# Patient Record
Sex: MALE | Race: BLACK OR AFRICAN AMERICAN | Employment: FULL TIME | URBAN - METROPOLITAN AREA
[De-identification: names, ages, dates, MRNs, and addresses within clinical notes are randomized per-mention and may not be internally consistent; named-entity substitution may affect disease eponyms.]

---

## 2018-03-13 LAB
ALBUMIN SERPL-MCNC: NORMAL G/DL
ALP BLD-CCNC: NORMAL U/L
ALT SERPL-CCNC: NORMAL U/L
ANION GAP SERPL CALCULATED.3IONS-SCNC: NORMAL MMOL/L
AST SERPL-CCNC: NORMAL U/L
AVERAGE GLUCOSE: 160
BILIRUB SERPL-MCNC: NORMAL MG/DL (ref 0.1–1.4)
BUN BLDV-MCNC: NORMAL MG/DL
CALCIUM SERPL-MCNC: NORMAL MG/DL
CHLORIDE BLD-SCNC: NORMAL MMOL/L
CHOLESTEROL, TOTAL: 189 MG/DL
CHOLESTEROL/HDL RATIO: ABNORMAL
CO2: NORMAL MMOL/L
CREAT SERPL-MCNC: 1 MG/DL
GFR CALCULATED: NORMAL
GLUCOSE BLD-MCNC: NORMAL MG/DL
HBA1C MFR BLD: 8.5 %
HDLC SERPL-MCNC: 41 MG/DL (ref 35–70)
LDL CHOLESTEROL CALCULATED: 115 MG/DL (ref 0–160)
POTASSIUM SERPL-SCNC: 4.4 MMOL/L
SODIUM BLD-SCNC: NORMAL MMOL/L
TOTAL PROTEIN: NORMAL
TRIGL SERPL-MCNC: 164 MG/DL
VLDLC SERPL CALC-MCNC: 8.5 MG/DL

## 2019-07-28 DIAGNOSIS — I10 ESSENTIAL HYPERTENSION: ICD-10-CM

## 2019-07-28 RX ORDER — ATORVASTATIN CALCIUM 20 MG/1
20 TABLET, FILM COATED ORAL DAILY
COMMUNITY
End: 2019-09-13 | Stop reason: SDUPTHER

## 2019-07-28 RX ORDER — IBUPROFEN 800 MG/1
800 TABLET ORAL EVERY 6 HOURS PRN
COMMUNITY
End: 2021-01-21 | Stop reason: SDUPTHER

## 2019-07-28 RX ORDER — METFORMIN HYDROCHLORIDE 500 MG/1
500 TABLET, EXTENDED RELEASE ORAL 2 TIMES DAILY
COMMUNITY
End: 2019-09-13 | Stop reason: SDUPTHER

## 2019-07-28 RX ORDER — LISINOPRIL 10 MG/1
10 TABLET ORAL DAILY
COMMUNITY
End: 2019-09-13 | Stop reason: SDUPTHER

## 2019-09-13 ENCOUNTER — OFFICE VISIT (OUTPATIENT)
Dept: FAMILY MEDICINE CLINIC | Age: 69
End: 2019-09-13
Payer: COMMERCIAL

## 2019-09-13 VITALS
BODY MASS INDEX: 25.87 KG/M2 | WEIGHT: 195.2 LBS | SYSTOLIC BLOOD PRESSURE: 138 MMHG | HEART RATE: 74 BPM | DIASTOLIC BLOOD PRESSURE: 78 MMHG | HEIGHT: 73 IN | OXYGEN SATURATION: 97 %

## 2019-09-13 DIAGNOSIS — Z13.220 SCREENING FOR HYPERLIPIDEMIA: ICD-10-CM

## 2019-09-13 DIAGNOSIS — Z23 NEED FOR PROPHYLACTIC VACCINATION AND INOCULATION AGAINST VARICELLA: ICD-10-CM

## 2019-09-13 DIAGNOSIS — I10 ESSENTIAL HYPERTENSION: ICD-10-CM

## 2019-09-13 DIAGNOSIS — E11.9 TYPE 2 DIABETES MELLITUS WITHOUT COMPLICATION, WITHOUT LONG-TERM CURRENT USE OF INSULIN (HCC): ICD-10-CM

## 2019-09-13 DIAGNOSIS — E78.2 MIXED HYPERLIPIDEMIA: ICD-10-CM

## 2019-09-13 DIAGNOSIS — E11.9 TYPE 2 DIABETES MELLITUS WITHOUT COMPLICATION, WITHOUT LONG-TERM CURRENT USE OF INSULIN (HCC): Primary | ICD-10-CM

## 2019-09-13 LAB
A/G RATIO: 2 (ref 1.1–2.2)
ALBUMIN SERPL-MCNC: 4.6 G/DL (ref 3.4–5)
ALP BLD-CCNC: 84 U/L (ref 40–129)
ALT SERPL-CCNC: 29 U/L (ref 10–40)
ANION GAP SERPL CALCULATED.3IONS-SCNC: 16 MMOL/L (ref 3–16)
AST SERPL-CCNC: 20 U/L (ref 15–37)
BILIRUB SERPL-MCNC: 0.4 MG/DL (ref 0–1)
BUN BLDV-MCNC: 13 MG/DL (ref 7–20)
CALCIUM SERPL-MCNC: 9.8 MG/DL (ref 8.3–10.6)
CHLORIDE BLD-SCNC: 103 MMOL/L (ref 99–110)
CHOLESTEROL, TOTAL: 132 MG/DL (ref 0–199)
CO2: 23 MMOL/L (ref 21–32)
CREAT SERPL-MCNC: 1.2 MG/DL (ref 0.8–1.3)
CREATININE URINE: 203.8 MG/DL (ref 39–259)
GFR AFRICAN AMERICAN: >60
GFR NON-AFRICAN AMERICAN: >60
GLOBULIN: 2.3 G/DL
GLUCOSE BLD-MCNC: 215 MG/DL (ref 70–99)
HDLC SERPL-MCNC: 42 MG/DL (ref 40–60)
LDL CHOLESTEROL CALCULATED: 50 MG/DL
MICROALBUMIN UR-MCNC: <1.2 MG/DL
MICROALBUMIN/CREAT UR-RTO: NORMAL MG/G (ref 0–30)
POTASSIUM SERPL-SCNC: 4.5 MMOL/L (ref 3.5–5.1)
SODIUM BLD-SCNC: 142 MMOL/L (ref 136–145)
TOTAL PROTEIN: 6.9 G/DL (ref 6.4–8.2)
TRIGL SERPL-MCNC: 200 MG/DL (ref 0–150)
VLDLC SERPL CALC-MCNC: 40 MG/DL

## 2019-09-13 PROCEDURE — G8427 DOCREV CUR MEDS BY ELIG CLIN: HCPCS | Performed by: FAMILY MEDICINE

## 2019-09-13 PROCEDURE — G8419 CALC BMI OUT NRM PARAM NOF/U: HCPCS | Performed by: FAMILY MEDICINE

## 2019-09-13 PROCEDURE — 3046F HEMOGLOBIN A1C LEVEL >9.0%: CPT | Performed by: FAMILY MEDICINE

## 2019-09-13 PROCEDURE — 1123F ACP DISCUSS/DSCN MKR DOCD: CPT | Performed by: FAMILY MEDICINE

## 2019-09-13 PROCEDURE — 90471 IMMUNIZATION ADMIN: CPT | Performed by: FAMILY MEDICINE

## 2019-09-13 PROCEDURE — 99214 OFFICE O/P EST MOD 30 MIN: CPT | Performed by: FAMILY MEDICINE

## 2019-09-13 PROCEDURE — 2022F DILAT RTA XM EVC RTNOPTHY: CPT | Performed by: FAMILY MEDICINE

## 2019-09-13 PROCEDURE — 90670 PCV13 VACCINE IM: CPT | Performed by: FAMILY MEDICINE

## 2019-09-13 PROCEDURE — 1036F TOBACCO NON-USER: CPT | Performed by: FAMILY MEDICINE

## 2019-09-13 PROCEDURE — 3017F COLORECTAL CA SCREEN DOC REV: CPT | Performed by: FAMILY MEDICINE

## 2019-09-13 PROCEDURE — 4040F PNEUMOC VAC/ADMIN/RCVD: CPT | Performed by: FAMILY MEDICINE

## 2019-09-13 RX ORDER — METFORMIN HYDROCHLORIDE 500 MG/1
TABLET, EXTENDED RELEASE ORAL
Qty: 180 TABLET | Refills: 1 | Status: SHIPPED | OUTPATIENT
Start: 2019-09-13 | End: 2020-10-01

## 2019-09-13 RX ORDER — ATORVASTATIN CALCIUM 20 MG/1
20 TABLET, FILM COATED ORAL DAILY
Qty: 90 TABLET | Refills: 1 | Status: SHIPPED | OUTPATIENT
Start: 2019-09-13 | End: 2020-10-01

## 2019-09-13 RX ORDER — LISINOPRIL 10 MG/1
10 TABLET ORAL DAILY
Qty: 90 TABLET | Refills: 1 | Status: SHIPPED | OUTPATIENT
Start: 2019-09-13 | End: 2020-10-01

## 2019-09-13 ASSESSMENT — PATIENT HEALTH QUESTIONNAIRE - PHQ9
SUM OF ALL RESPONSES TO PHQ QUESTIONS 1-9: 0
SUM OF ALL RESPONSES TO PHQ9 QUESTIONS 1 & 2: 0
2. FEELING DOWN, DEPRESSED OR HOPELESS: 0
1. LITTLE INTEREST OR PLEASURE IN DOING THINGS: 0
SUM OF ALL RESPONSES TO PHQ QUESTIONS 1-9: 0

## 2019-09-13 ASSESSMENT — ENCOUNTER SYMPTOMS
COUGH: 0
WHEEZING: 0
RESPIRATORY NEGATIVE: 1
CHEST TIGHTNESS: 0
SHORTNESS OF BREATH: 0
ABDOMINAL PAIN: 0

## 2019-09-13 NOTE — PROGRESS NOTES
2019     João Roman      Chief Complaint   Patient presents with    Annual Exam    Medication Refill     TO /NORTH       HPI      Nedra Latif is a 80 y.o. male who presents today with the followin. Type 2 diabetes mellitus without complication, without long-term current use of insulin ([address]    2. Screening for hyperlipidemia    3. Need for prophylactic vaccination and inoculation against varicella    4. Mixed hyperlipidemia    5. Essential hypertension    3 problems  He has diabetes type 2 fairly well-controlled but his A1c has been around 8 last time he had a check 6 months ago  He is on Trulicity and oral agents  He is fairly active physically  His weight is stable  He has hypertension hyperlipidemia both of which have been reasonably well controlled  He has not yet had pneumococcal immunization  Is a low risk for hepatitis C and does not need to be screened    REVIEW OF SYMPTOMS    Review of Systems   Constitutional: Negative. Negative for activity change, appetite change and unexpected weight change. HENT: Negative. Eyes:        Due for diabetic eye exam was so advised   Respiratory: Negative. Negative for cough, chest tightness, shortness of breath and wheezing. Cardiovascular: Negative. Negative for chest pain and leg swelling. Gastrointestinal: Negative for abdominal pain. Genitourinary: Negative. Musculoskeletal: Negative. Neurological: Negative. Psychiatric/Behavioral: Negative.         PAST MEDICAL HISTORY  Past Medical History:   Diagnosis Date    Hyperlipidemia     Hypertension     Type 2 diabetes mellitus ([address]        FAMILY HISTORY  Family History   Problem Relation Age of Onset    Diabetes Mother     High Cholesterol Mother     Heart Disease Mother     Cancer Father     Diabetes Sister     High Blood Pressure Sister     High Cholesterol Sister        SOCIAL HISTORY  Social History     Socioeconomic History    Marital status: Single     Spouse name: None   

## 2019-09-14 LAB
ESTIMATED AVERAGE GLUCOSE: 194.4 MG/DL
HBA1C MFR BLD: 8.4 %

## 2019-09-16 ENCOUNTER — TELEPHONE (OUTPATIENT)
Dept: FAMILY MEDICINE CLINIC | Age: 69
End: 2019-09-16

## 2019-10-21 ENCOUNTER — TELEPHONE (OUTPATIENT)
Dept: FAMILY MEDICINE CLINIC | Age: 69
End: 2019-10-21

## 2020-03-13 ENCOUNTER — OFFICE VISIT (OUTPATIENT)
Dept: FAMILY MEDICINE CLINIC | Age: 70
End: 2020-03-13
Payer: COMMERCIAL

## 2020-03-13 VITALS
HEART RATE: 74 BPM | HEIGHT: 73 IN | WEIGHT: 198.4 LBS | SYSTOLIC BLOOD PRESSURE: 128 MMHG | DIASTOLIC BLOOD PRESSURE: 72 MMHG | BODY MASS INDEX: 26.29 KG/M2

## 2020-03-13 DIAGNOSIS — E11.9 TYPE 2 DIABETES MELLITUS WITHOUT COMPLICATION, WITHOUT LONG-TERM CURRENT USE OF INSULIN (HCC): ICD-10-CM

## 2020-03-13 LAB
A/G RATIO: 1.7 (ref 1.1–2.2)
ALBUMIN SERPL-MCNC: 4.4 G/DL (ref 3.4–5)
ALP BLD-CCNC: 79 U/L (ref 40–129)
ALT SERPL-CCNC: 26 U/L (ref 10–40)
ANION GAP SERPL CALCULATED.3IONS-SCNC: 12 MMOL/L (ref 3–16)
AST SERPL-CCNC: 23 U/L (ref 15–37)
BILIRUB SERPL-MCNC: 0.4 MG/DL (ref 0–1)
BUN BLDV-MCNC: 12 MG/DL (ref 7–20)
CALCIUM SERPL-MCNC: 9.5 MG/DL (ref 8.3–10.6)
CHLORIDE BLD-SCNC: 106 MMOL/L (ref 99–110)
CO2: 25 MMOL/L (ref 21–32)
CREAT SERPL-MCNC: 1 MG/DL (ref 0.8–1.3)
GFR AFRICAN AMERICAN: >60
GFR NON-AFRICAN AMERICAN: >60
GLOBULIN: 2.6 G/DL
GLUCOSE BLD-MCNC: 107 MG/DL (ref 70–99)
POTASSIUM SERPL-SCNC: 4.4 MMOL/L (ref 3.5–5.1)
SODIUM BLD-SCNC: 143 MMOL/L (ref 136–145)
TOTAL PROTEIN: 7 G/DL (ref 6.4–8.2)

## 2020-03-13 PROCEDURE — G8417 CALC BMI ABV UP PARAM F/U: HCPCS | Performed by: FAMILY MEDICINE

## 2020-03-13 PROCEDURE — 99213 OFFICE O/P EST LOW 20 MIN: CPT | Performed by: FAMILY MEDICINE

## 2020-03-13 PROCEDURE — 83036 HEMOGLOBIN GLYCOSYLATED A1C: CPT | Performed by: FAMILY MEDICINE

## 2020-03-13 PROCEDURE — 1123F ACP DISCUSS/DSCN MKR DOCD: CPT | Performed by: FAMILY MEDICINE

## 2020-03-13 PROCEDURE — G8427 DOCREV CUR MEDS BY ELIG CLIN: HCPCS | Performed by: FAMILY MEDICINE

## 2020-03-13 PROCEDURE — G8484 FLU IMMUNIZE NO ADMIN: HCPCS | Performed by: FAMILY MEDICINE

## 2020-03-13 PROCEDURE — 3017F COLORECTAL CA SCREEN DOC REV: CPT | Performed by: FAMILY MEDICINE

## 2020-03-13 PROCEDURE — 2022F DILAT RTA XM EVC RTNOPTHY: CPT | Performed by: FAMILY MEDICINE

## 2020-03-13 PROCEDURE — 1036F TOBACCO NON-USER: CPT | Performed by: FAMILY MEDICINE

## 2020-03-13 PROCEDURE — 4040F PNEUMOC VAC/ADMIN/RCVD: CPT | Performed by: FAMILY MEDICINE

## 2020-03-13 PROCEDURE — 3046F HEMOGLOBIN A1C LEVEL >9.0%: CPT | Performed by: FAMILY MEDICINE

## 2020-03-13 ASSESSMENT — ENCOUNTER SYMPTOMS
COUGH: 0
CHEST TIGHTNESS: 0
RESPIRATORY NEGATIVE: 1
WHEEZING: 0
ABDOMINAL PAIN: 0
SHORTNESS OF BREATH: 0

## 2020-03-13 ASSESSMENT — PATIENT HEALTH QUESTIONNAIRE - PHQ9
1. LITTLE INTEREST OR PLEASURE IN DOING THINGS: 0
SUM OF ALL RESPONSES TO PHQ QUESTIONS 1-9: 0
SUM OF ALL RESPONSES TO PHQ QUESTIONS 1-9: 0
2. FEELING DOWN, DEPRESSED OR HOPELESS: 0
SUM OF ALL RESPONSES TO PHQ9 QUESTIONS 1 & 2: 0

## 2020-03-13 NOTE — PROGRESS NOTES
3/13/2020     Hubert Barbosa      Chief Complaint   Patient presents with    6 Month Follow-Up     non fasting, no questions or concerns per pt , no med refills per pt  current pharm anyi Cedeño Rupa is a 77 y.o. male who presents today with the followin. Need for prophylactic vaccination and inoculation against varicella    2. Type 2 diabetes mellitus without complication, without long-term current use of insulin ([address]    3. Essential hypertension    4. Mixed hyperlipidemia    Patient is here for follow-up  He was incorrectly using his Trulicity pen  I now he has been doing it as directed  Since getting good fasting blood sugars in the 1 20-1 50 range  He has any signs or symptoms of hypoglycemia  He complains of some fatigue but he has a different job situation he feels that he sleeps okay and he denies any depression    REVIEW OF SYMPTOMS    Review of Systems   Constitutional: Negative. Negative for activity change, appetite change and unexpected weight change. Respiratory: Negative. Negative for cough, chest tightness, shortness of breath and wheezing. Cardiovascular: Negative. Negative for chest pain and leg swelling. Gastrointestinal: Negative for abdominal pain. Neurological: Negative. Psychiatric/Behavioral: Negative.       A1c done in the office was 6.5  PAST MEDICAL HISTORY  Past Medical History:   Diagnosis Date    Hyperlipidemia     Hypertension     Type 2 diabetes mellitus ([address]        FAMILY HISTORY  Family History   Problem Relation Age of Onset    Diabetes Mother     High Cholesterol Mother     Heart Disease Mother     Cancer Father     Diabetes Sister     High Blood Pressure Sister     High Cholesterol Sister        SOCIAL HISTORY  Social History     Socioeconomic History    Marital status: Single     Spouse name: None    Number of children: None    Years of education: None    Highest education level: None   Occupational History    None   Social visit. ALLERGIES  No Known Allergies    PHYSICAL EXAM    /72 (Site: Right Upper Arm, Position: Sitting, Cuff Size: Medium Adult)   Pulse 74   Ht 6' 1\" (1.854 m)   Wt 198 lb 6.4 oz (90 kg)   BMI 26.18 kg/m²     Physical Exam    ASSESSMENT and Kalyani Schaefer was seen today for 6 month follow-up. Diagnoses and all orders for this visit:    Need for prophylactic vaccination and inoculation against varicella  -     zoster recombinant adjuvanted vaccine Psychiatric) 50 MCG/0.5ML SUSR injection; Inject 0.5 mLs into the muscle once for 1 dose 50 MCG IM then repeat 2-6 months. Type 2 diabetes mellitus without complication, without long-term current use of insulin (HCC)  -     POCT glycosylated hemoglobin (Hb A1C)  -     Comprehensive Metabolic Panel; Future  -     Hemoglobin A1C; Future    Essential hypertension    Mixed hyperlipidemia      Patient diabetes under excellent control plan will get some extra labs chemistry profile and see him back in 6 months  Return in about 6 months (around 9/13/2020). Electronically signed by Lars Grayson MD on 3/13/2020    Please note that this chart was generated using dragon dictation software. Although every effort was made to ensure the accuracy of this automated transcription, some errors in transcription may have occurred.

## 2020-03-14 LAB
ESTIMATED AVERAGE GLUCOSE: 142.7 MG/DL
HBA1C MFR BLD: 6.6 %

## 2020-05-15 RX ORDER — DULAGLUTIDE 0.75 MG/.5ML
INJECTION, SOLUTION SUBCUTANEOUS
Qty: 4 PEN | Refills: 0 | Status: SHIPPED | OUTPATIENT
Start: 2020-05-15 | End: 2020-07-22 | Stop reason: SDUPTHER

## 2020-06-02 ENCOUNTER — HOSPITAL ENCOUNTER (EMERGENCY)
Age: 70
Discharge: HOME | End: 2020-06-02
Attending: EMERGENCY MEDICINE
Payer: COMMERCIAL

## 2020-06-02 VITALS
HEIGHT: 75 IN | HEART RATE: 96 BPM | BODY MASS INDEX: 24.87 KG/M2 | WEIGHT: 200 LBS | DIASTOLIC BLOOD PRESSURE: 88 MMHG | TEMPERATURE: 99 F | RESPIRATION RATE: 19 BRPM | SYSTOLIC BLOOD PRESSURE: 145 MMHG | OXYGEN SATURATION: 100 %

## 2020-06-02 LAB
ALBUMIN SERPL-MCNC: 4.6 GM/DL (ref 3.4–5)
ALP BLD-CCNC: 91 IU/L (ref 40–129)
ALT SERPL-CCNC: 31 U/L (ref 10–40)
ANION GAP SERPL CALCULATED.3IONS-SCNC: 11 MMOL/L (ref 4–16)
AST SERPL-CCNC: 27 IU/L (ref 15–37)
BASOPHILS ABSOLUTE: 0.1 K/CU MM
BASOPHILS RELATIVE PERCENT: 0.6 % (ref 0–1)
BILIRUB SERPL-MCNC: 0.3 MG/DL (ref 0–1)
BUN BLDV-MCNC: 13 MG/DL (ref 6–23)
CALCIUM SERPL-MCNC: 9.6 MG/DL (ref 8.3–10.6)
CHLORIDE BLD-SCNC: 104 MMOL/L (ref 99–110)
CO2: 26 MMOL/L (ref 21–32)
CREAT SERPL-MCNC: 1.2 MG/DL (ref 0.9–1.3)
DIFFERENTIAL TYPE: ABNORMAL
EOSINOPHILS ABSOLUTE: 0.1 K/CU MM
EOSINOPHILS RELATIVE PERCENT: 0.9 % (ref 0–3)
GFR AFRICAN AMERICAN: >60 ML/MIN/1.73M2
GFR NON-AFRICAN AMERICAN: >60 ML/MIN/1.73M2
GLUCOSE BLD-MCNC: 146 MG/DL (ref 70–99)
HCT VFR BLD CALC: 48.7 % (ref 42–52)
HEMOGLOBIN: 15.4 GM/DL (ref 13.5–18)
IMMATURE NEUTROPHIL %: 0.2 % (ref 0–0.43)
LYMPHOCYTES ABSOLUTE: 1.8 K/CU MM
LYMPHOCYTES RELATIVE PERCENT: 22.3 % (ref 24–44)
MAGNESIUM: 1.9 MG/DL (ref 1.8–2.4)
MCH RBC QN AUTO: 26.8 PG (ref 27–31)
MCHC RBC AUTO-ENTMCNC: 31.6 % (ref 32–36)
MCV RBC AUTO: 84.7 FL (ref 78–100)
MONOCYTES ABSOLUTE: 0.7 K/CU MM
MONOCYTES RELATIVE PERCENT: 8.4 % (ref 0–4)
NUCLEATED RBC %: 0 %
PDW BLD-RTO: 13.7 % (ref 11.7–14.9)
PLATELET # BLD: 327 K/CU MM (ref 140–440)
PMV BLD AUTO: 9.2 FL (ref 7.5–11.1)
POTASSIUM SERPL-SCNC: 5 MMOL/L (ref 3.5–5.1)
RBC # BLD: 5.75 M/CU MM (ref 4.6–6.2)
SEGMENTED NEUTROPHILS ABSOLUTE COUNT: 5.5 K/CU MM
SEGMENTED NEUTROPHILS RELATIVE PERCENT: 67.6 % (ref 36–66)
SODIUM BLD-SCNC: 141 MMOL/L (ref 135–145)
T4 FREE: 0.95 NG/DL (ref 0.9–1.8)
TOTAL IMMATURE NEUTOROPHIL: 0.02 K/CU MM
TOTAL NUCLEATED RBC: 0 K/CU MM
TOTAL PROTEIN: 7.4 GM/DL (ref 6.4–8.2)
TROPONIN T: <0.01 NG/ML
TSH HIGH SENSITIVITY: 1.29 UIU/ML (ref 0.27–4.2)
WBC # BLD: 8.1 K/CU MM (ref 4–10.5)

## 2020-06-02 PROCEDURE — 83735 ASSAY OF MAGNESIUM: CPT

## 2020-06-02 PROCEDURE — 2580000003 HC RX 258: Performed by: EMERGENCY MEDICINE

## 2020-06-02 PROCEDURE — 80053 COMPREHEN METABOLIC PANEL: CPT

## 2020-06-02 PROCEDURE — 6360000002 HC RX W HCPCS

## 2020-06-02 PROCEDURE — 93005 ELECTROCARDIOGRAM TRACING: CPT | Performed by: PHYSICIAN ASSISTANT

## 2020-06-02 PROCEDURE — 84439 ASSAY OF FREE THYROXINE: CPT

## 2020-06-02 PROCEDURE — 4500000030 HC CRITICAL CARE PROCEDURE

## 2020-06-02 PROCEDURE — 99283 EMERGENCY DEPT VISIT LOW MDM: CPT

## 2020-06-02 PROCEDURE — 96361 HYDRATE IV INFUSION ADD-ON: CPT

## 2020-06-02 PROCEDURE — 85025 COMPLETE CBC W/AUTO DIFF WBC: CPT

## 2020-06-02 PROCEDURE — 84484 ASSAY OF TROPONIN QUANT: CPT

## 2020-06-02 PROCEDURE — 84443 ASSAY THYROID STIM HORMONE: CPT

## 2020-06-02 PROCEDURE — 96374 THER/PROPH/DIAG INJ IV PUSH: CPT

## 2020-06-02 PROCEDURE — 93005 ELECTROCARDIOGRAM TRACING: CPT | Performed by: EMERGENCY MEDICINE

## 2020-06-02 RX ORDER — ADENOSINE 3 MG/ML
6 INJECTION, SOLUTION INTRAVENOUS ONCE
Status: COMPLETED | OUTPATIENT
Start: 2020-06-02 | End: 2020-06-02

## 2020-06-02 RX ORDER — ADENOSINE 3 MG/ML
INJECTION, SOLUTION INTRAVENOUS
Status: COMPLETED
Start: 2020-06-02 | End: 2020-06-02

## 2020-06-02 RX ORDER — 0.9 % SODIUM CHLORIDE 0.9 %
1000 INTRAVENOUS SOLUTION INTRAVENOUS ONCE
Status: COMPLETED | OUTPATIENT
Start: 2020-06-02 | End: 2020-06-02

## 2020-06-02 RX ADMIN — ADENOSINE 6 MG: 3 INJECTION, SOLUTION INTRAVENOUS at 14:31

## 2020-06-02 RX ADMIN — ADENOSINE 6 MG: 3 INJECTION INTRAVENOUS at 14:31

## 2020-06-02 RX ADMIN — SODIUM CHLORIDE 1000 ML: 9 INJECTION, SOLUTION INTRAVENOUS at 14:36

## 2020-06-02 NOTE — ED NOTES
[address] paged Dr Paolo Moffett  06/02/20 0664 [id number] Dr Dominic Marte returned call      Christiana Art  06/02/20 [id number]

## 2020-06-02 NOTE — ED PROVIDER NOTES
cardioversion:  Sinus tachycardia with rate of 128 bpm, normal intervals. No ST elevation. Otherwise normal EKG. Cardioversion (chemical) Procedure Note    Indication: supraventricular tachycardia    Consent: The patient provided verbal consent for this procedure. Pre-Medication: none    Procedure: The patient was placed in the supine position and the chest area was exposed. The cardioversion pads were applied in the standard manner and configuration in case they were needed    Attempt #1: adenosine 6mg IV was pushed (at 1430) with success, cardioverted to normal sinus rhythm with rate 110s    Attempt #2: Not necessary    The patient tolerated the procedure well. Complications: None        Total critical care time today provided was 35 minutes. This excludes seperately billable procedure. Critical care time provided for SVT that required close evaluation and/or intervention with concern for patient decompensation. All diagnostic, treatment, and disposition decisions were made by myself in conjunction with the advanced practice provider. For all further details of the patient's emergency department visit, please see the advanced practice provider's documentation. Comment: Please note this report has been produced using speech recognition software and may contain errors related to that system including errors in grammar, punctuation, and spelling, as well as words and phrases that may be inappropriate. If there are any questions or concerns please feel free to contact the dictating provider for clarification.        Michelle Elise MD  06/02/20 0425

## 2020-06-02 NOTE — ED NOTES
Discharge instructions reviewed with pt and questions addressed at this time. Pt alert and oriented x 4 at time of discharge, no signs of acute distress noted. Pt ambulatory to Emergency Department waiting room and steady gait noted.         Francisco Palma RN  06/02/20 [id number]

## 2020-06-02 NOTE — ED PROVIDER NOTES
EMERGENCY DEPARTMENT ENCOUNTER        PCP: Barby Baez MD    CHIEF COMPLAINT    Chief Complaint   Patient presents with    Other     noticed a knot to right side of neck 20 minutes ago. denies pain       Of note, this patient was also evaluated by the attending physician, Dr. Manuel Echols. HPI      Nicole Hoffman is a 77 y.o. male with PMH of HTN, HLD, DM type 2 who presents with \"I noticed a flinching of the right side of my neck. \"  Context is patient reports that he was mowing the lawn and pulling weeds this morning when he felt lightheaded for a few seconds and then it resolved. He went inside and looked in the mirror and noticed that the right side of his neck was \"flinching\" and this made him concerned so he came to the emergency department. Patient reports that he feels at baseline state of health at this time. He denies any illicit drug usage. He reports he drank a single cup of coffee this morning like he usually does. No increased caffeine intake. Patient denies any direct injury or trauma, chest pain, palpitations, numbness, weakness, tingling. REVIEW OF SYSTEMS    Constitutional:  Denies fever, chills. HENT:  Denies sore throat or ear pain   Cardiovascular: Denies chest pain, palpitations,  Denies syncope, no extremity edema.   Respiratory:  Denies cough, shortness of breath, or hemoptysis    GI:  Denies abdominal pain, nausea, vomiting, or diarrhea  :  Denies any urinary symptoms  Musculoskeletal:  Denies back pain, no extremity swelling  Skin:  Denies rash  Neurologic:  + lightheadedness; Denies dizziness, headache, confusion, memory loss, vision changes, hearing changes, speech changes, gait changes, syncope, focal weakness or sensory changes   Endocrine:  Denies polyuria or polydypsia   Lymphatic:  Denies swollen glands     All other review of systems are negative  See HPI and nursing notes for additional information         PAST MEDICAL & SURGICAL HISTORY    Past Medical History: Interpretation  Please see ED physician's note for EKG interpretation - Dr. Desiree Emmanuel    RADIOLOGY/PROCEDURES    No orders to display           ED COURSE & MEDICAL DECISION MAKING       Vital signs and nursing notes reviewed during ED course. Patient care and presentation staffed with and seen in conjunction with supervising physician, Dr. Desiree Emmanuel. Patient presents as above which prompted work-up today. Patient likely went into SVT after doing some yard work outside when he became lightheaded for a few seconds. He is otherwise at baseline state of health. Patient found to be in SVT once roomed in the ED. Patient then moved to trauma room and pacer pads placed on patient. Had patient try to perform vagal maneuvers without resolution of SVT. Patient then treated with adenosine and converted to normal sinus rhythm. Tachycardia resolved. Patient placed on telemetry monitoring as well as continuous pulse oximetry monitoring. While in the ED today, labs and EKG were obtained. For EKG interpretation- please see ED physician's note. Labs without clinically significant derangements. Troponin is negative. Patient continues to do well in the ED. I consulted on-call cardiologist, Dr. Jaylen Ramírez, and we discussed the patient's case. He feels patient is stable for outpatient management/close follow-up in their office. Patient is nontoxic appearing. Vital signs stable. Patient is stable for outpatient management. Patient comfortable with discharge at this time. Recommend patient not perform strenuous activity or drink caffeine until assessed by cardiology. Patient voices understanding. All pertinent Lab data and radiographic results reviewed with patient at bedside. The patient and/or the family were informed of the results of any tests/labs/imaging, the treatment plan, and time was allotted to answer questions. Diagnosis, disposition, and treatment plan reviewed in detail with patient.   Patient understands

## 2020-06-03 LAB
EKG ATRIAL RATE: 128 BPM
EKG ATRIAL RATE: 54 BPM
EKG DIAGNOSIS: NORMAL
EKG DIAGNOSIS: NORMAL
EKG P AXIS: 46 DEGREES
EKG P-R INTERVAL: 154 MS
EKG Q-T INTERVAL: 264 MS
EKG Q-T INTERVAL: 280 MS
EKG QRS DURATION: 76 MS
EKG QRS DURATION: 76 MS
EKG QTC CALCULATION (BAZETT): 408 MS
EKG QTC CALCULATION (BAZETT): 424 MS
EKG R AXIS: 41 DEGREES
EKG R AXIS: 52 DEGREES
EKG T AXIS: 43 DEGREES
EKG T AXIS: 7 DEGREES
EKG VENTRICULAR RATE: 128 BPM
EKG VENTRICULAR RATE: 155 BPM

## 2020-06-03 PROCEDURE — 93010 ELECTROCARDIOGRAM REPORT: CPT | Performed by: INTERNAL MEDICINE

## 2020-07-08 RX ORDER — ATORVASTATIN CALCIUM 20 MG/1
TABLET, FILM COATED ORAL
Qty: 90 TABLET | Refills: 1 | OUTPATIENT
Start: 2020-07-08

## 2020-07-08 RX ORDER — METFORMIN HYDROCHLORIDE 500 MG/1
TABLET, EXTENDED RELEASE ORAL
Qty: 180 TABLET | Refills: 1 | OUTPATIENT
Start: 2020-07-08

## 2020-07-08 RX ORDER — LISINOPRIL 10 MG/1
TABLET ORAL
Qty: 90 TABLET | Refills: 1 | OUTPATIENT
Start: 2020-07-08

## 2020-07-22 RX ORDER — DULAGLUTIDE 0.75 MG/.5ML
INJECTION, SOLUTION SUBCUTANEOUS
Qty: 5 PEN | Refills: 1 | Status: SHIPPED | OUTPATIENT
Start: 2020-07-22 | End: 2021-01-04 | Stop reason: SDUPTHER

## 2020-08-21 RX ORDER — LISINOPRIL 10 MG/1
TABLET ORAL
Qty: 90 TABLET | Refills: 1 | OUTPATIENT
Start: 2020-08-21

## 2020-08-21 RX ORDER — METFORMIN HYDROCHLORIDE 500 MG/1
TABLET, EXTENDED RELEASE ORAL
Qty: 180 TABLET | Refills: 1 | OUTPATIENT
Start: 2020-08-21

## 2020-08-21 RX ORDER — ATORVASTATIN CALCIUM 20 MG/1
TABLET, FILM COATED ORAL
Qty: 90 TABLET | Refills: 1 | OUTPATIENT
Start: 2020-08-21

## 2020-09-11 ENCOUNTER — OFFICE VISIT (OUTPATIENT)
Dept: FAMILY MEDICINE CLINIC | Age: 70
End: 2020-09-11
Payer: COMMERCIAL

## 2020-09-11 ENCOUNTER — TELEPHONE (OUTPATIENT)
Dept: CARDIOLOGY CLINIC | Age: 70
End: 2020-09-11

## 2020-09-11 VITALS
HEIGHT: 75 IN | HEART RATE: 72 BPM | BODY MASS INDEX: 25.49 KG/M2 | TEMPERATURE: 97.3 F | DIASTOLIC BLOOD PRESSURE: 78 MMHG | SYSTOLIC BLOOD PRESSURE: 140 MMHG | WEIGHT: 205 LBS | OXYGEN SATURATION: 100 %

## 2020-09-11 LAB — HBA1C MFR BLD: 6.4 %

## 2020-09-11 PROCEDURE — 1036F TOBACCO NON-USER: CPT | Performed by: FAMILY MEDICINE

## 2020-09-11 PROCEDURE — 3017F COLORECTAL CA SCREEN DOC REV: CPT | Performed by: FAMILY MEDICINE

## 2020-09-11 PROCEDURE — 99214 OFFICE O/P EST MOD 30 MIN: CPT | Performed by: FAMILY MEDICINE

## 2020-09-11 PROCEDURE — 2022F DILAT RTA XM EVC RTNOPTHY: CPT | Performed by: FAMILY MEDICINE

## 2020-09-11 PROCEDURE — 83036 HEMOGLOBIN GLYCOSYLATED A1C: CPT | Performed by: FAMILY MEDICINE

## 2020-09-11 PROCEDURE — 3044F HG A1C LEVEL LT 7.0%: CPT | Performed by: FAMILY MEDICINE

## 2020-09-11 PROCEDURE — G8417 CALC BMI ABV UP PARAM F/U: HCPCS | Performed by: FAMILY MEDICINE

## 2020-09-11 PROCEDURE — 4040F PNEUMOC VAC/ADMIN/RCVD: CPT | Performed by: FAMILY MEDICINE

## 2020-09-11 PROCEDURE — G8427 DOCREV CUR MEDS BY ELIG CLIN: HCPCS | Performed by: FAMILY MEDICINE

## 2020-09-11 PROCEDURE — 1123F ACP DISCUSS/DSCN MKR DOCD: CPT | Performed by: FAMILY MEDICINE

## 2020-09-11 ASSESSMENT — ENCOUNTER SYMPTOMS
SORE THROAT: 0
ALLERGIC/IMMUNOLOGIC NEGATIVE: 1
RHINORRHEA: 0
SHORTNESS OF BREATH: 0
COUGH: 0

## 2020-09-11 NOTE — PROGRESS NOTES
2020     [facility]      Chief Complaint   Patient presents with    Other     6 month visit, no problems , pt was in the ER in  for high heart rate       HPI      Claudia Mcgraw is a 77 y.o. male who presents today with the followin. SVT (supraventricular tachycardia) ([address]    2. Type 2 diabetes mellitus without complication, without long-term current use of insulin ([address]    3. Essential hypertension    4. Mixed hyperlipidemia    Patient is here for follow-up  He did not tell me this but he been in the emergency room with a spell of tachycardia  He was documented to be SVT they broke I think with Identicard and possibly carotid massage  He has any recurrent symptoms of this but the first time he had a  He was supposed to see a cardiologist who was consulted by phone and the patient did not follow through but after I talked to him he agreed to go ahead with that  He does not have any history of known heart disease  He did not have any chest pain associated with this or syncope    REVIEW OF SYMPTOMS    Review of Systems   Constitutional: Negative for activity change, fever and unexpected weight change. HENT: Negative for congestion, rhinorrhea and sore throat. Respiratory: Negative for cough and shortness of breath. Cardiovascular:        History of hypertension and hyperlipidemia  See HPI for history of recent SVT   Endocrine:        Diabetes type 2  He is on Trulicity and oral medicines and doing well   Allergic/Immunologic: Negative. Neurological: Negative. Psychiatric/Behavioral: Negative.         PAST MEDICAL HISTORY  Past Medical History:   Diagnosis Date    Hyperlipidemia     Hypertension     Type 2 diabetes mellitus ([address]        FAMILY HISTORY  Family History   Problem Relation Age of Onset    Diabetes Mother     High Cholesterol Mother     Heart Disease Mother     Cancer Father     Diabetes Sister     High Blood Pressure Sister     High Cholesterol Sister        SOCIAL HISTORY  Social History     Socioeconomic History    Marital status: Single     Spouse name: None    Number of children: None    Years of education: None    Highest education level: None   Occupational History    None   Social Needs    Financial resource strain: None    Food insecurity     Worry: None     Inability: None    Transportation needs     Medical: None     Non-medical: None   Tobacco Use    Smoking status: Never Smoker    Smokeless tobacco: Never Used   Substance and Sexual Activity    Alcohol use: Not Currently     Comment: 2-3 beers per year    Drug use: No    Sexual activity: None   Lifestyle    Physical activity     Days per week: None     Minutes per session: None    Stress: None   Relationships    Social connections     Talks on phone: None     Gets together: None     Attends Christianity service: None     Active member of club or organization: None     Attends meetings of clubs or organizations: None     Relationship status: None    Intimate partner violence     Fear of current or ex partner: None     Emotionally abused: None     Physically abused: None     Forced sexual activity: None   Other Topics Concern    None   Social History Narrative    None        SURGICAL HISTORY  No past surgical history on file.     CURRENT MEDICATIONS  Current Outpatient Medications   Medication Sig Dispense Refill    Dulaglutide (TRULICITY) 1.69 HW/1.3UJ SOPN inject 0.5 milliliters ( 0.75 milligrams ) subcutaneously every 7 days 5 pen 1    atorvastatin (LIPITOR) 20 MG tablet Take 1 tablet by mouth daily 90 tablet 1    lisinopril (PRINIVIL;ZESTRIL) 10 MG tablet Take 1 tablet by mouth daily 90 tablet 1    metFORMIN (GLUCOPHAGE-XR) 500 MG extended release tablet TAKE 2  tablet 1    ONETOUCH DELICA LANCETS FINE MISC by Does not apply route      Glucose Blood (ONETOUCH VERIO VI) by In Vitro route      ibuprofen (ADVIL;MOTRIN) 800 MG tablet Take 800 mg by mouth every 6 hours as needed for Pain No current facility-administered medications for this visit. ALLERGIES  No Known Allergies    PHYSICAL EXAM    BP (!) 140/78   Pulse 72   Temp 97.3 °F (36.3 °C)   Ht 6' 3\" (1.905 m)   Wt 205 lb (93 kg)   SpO2 100%   BMI 25.62 kg/m²     Physical Exam  Vitals signs and nursing note reviewed. Constitutional:       General: He is not in acute distress. Appearance: Normal appearance. HENT:      Right Ear: External ear normal.      Left Ear: External ear normal.   Eyes:      Conjunctiva/sclera: Conjunctivae normal.   Cardiovascular:      Rate and Rhythm: Normal rate. Pulmonary:      Effort: Pulmonary effort is normal. No respiratory distress. Neurological:      General: No focal deficit present. Mental Status: He is alert. Mental status is at baseline. Psychiatric:         Mood and Affect: Mood normal.         Thought Content: Thought content normal.     I reviewed his ER report and labs and EKG that were done  Got an A1c in the office today that was 6.4    ASSESSMENT and Timo Bennett was seen today for other. Diagnoses and all orders for this visit:    SVT (supraventricular tachycardia) ([address]  -     Silvano Lugo MD, Cardiology, [place]    Type 2 diabetes mellitus without complication, without long-term current use of insulin ([address]  -     POCT glycosylated hemoglobin (Hb A1C)    Essential hypertension    Mixed hyperlipidemia    Patient needs [facility]ology [facility] at [facility] to go ahead and set that up  He can continue same medications and diet  Get flu vaccine through his employment  Follow-up here in 6 months  We will get labs at that time including lipids chemistry profile A1c and a microalbumin    Return in about 6 months (around 3/11/2021). Electronically signed by Brandon Gonzalez MD on 9/11/2020    Please note that this chart was generated using dragon dictation software.   Although every effort was made to ensure the accuracy of this automated transcription, some errors in transcription may have occurred.

## 2020-09-14 ENCOUNTER — TELEPHONE (OUTPATIENT)
Dept: CARDIOLOGY CLINIC | Age: 70
End: 2020-09-14

## 2020-09-15 ENCOUNTER — TELEPHONE (OUTPATIENT)
Dept: CARDIOLOGY CLINIC | Age: 70
End: 2020-09-15

## 2020-09-24 ENCOUNTER — NURSE ONLY (OUTPATIENT)
Dept: CARDIOLOGY CLINIC | Age: 70
End: 2020-09-24
Payer: COMMERCIAL

## 2020-09-24 ENCOUNTER — INITIAL CONSULT (OUTPATIENT)
Dept: CARDIOLOGY CLINIC | Age: 70
End: 2020-09-24
Payer: COMMERCIAL

## 2020-09-24 VITALS
SYSTOLIC BLOOD PRESSURE: 118 MMHG | DIASTOLIC BLOOD PRESSURE: 70 MMHG | WEIGHT: 208.8 LBS | HEART RATE: 85 BPM | BODY MASS INDEX: 25.96 KG/M2 | HEIGHT: 75 IN

## 2020-09-24 PROBLEM — I25.10 ASCVD (ARTERIOSCLEROTIC CARDIOVASCULAR DISEASE): Status: ACTIVE | Noted: 2020-09-24

## 2020-09-24 PROBLEM — I47.10 PSVT (PAROXYSMAL SUPRAVENTRICULAR TACHYCARDIA): Status: ACTIVE | Noted: 2020-09-24

## 2020-09-24 PROBLEM — I47.1 PSVT (PAROXYSMAL SUPRAVENTRICULAR TACHYCARDIA) (HCC): Status: ACTIVE | Noted: 2020-09-24

## 2020-09-24 PROCEDURE — G8417 CALC BMI ABV UP PARAM F/U: HCPCS | Performed by: INTERNAL MEDICINE

## 2020-09-24 PROCEDURE — 93000 ELECTROCARDIOGRAM COMPLETE: CPT | Performed by: INTERNAL MEDICINE

## 2020-09-24 PROCEDURE — G8427 DOCREV CUR MEDS BY ELIG CLIN: HCPCS | Performed by: INTERNAL MEDICINE

## 2020-09-24 PROCEDURE — 2022F DILAT RTA XM EVC RTNOPTHY: CPT | Performed by: INTERNAL MEDICINE

## 2020-09-24 PROCEDURE — 99204 OFFICE O/P NEW MOD 45 MIN: CPT | Performed by: INTERNAL MEDICINE

## 2020-09-24 NOTE — ASSESSMENT & PLAN NOTE
Isolated event on June 2. We will obtain a 48-hour Holter monitor to see if he has any asymptomatic short episodes and consider EP evaluation if he has recurrent sores. We also discussed the participation as a volunteer for SVT trials we have in our research being and he is willing to monitor and go over the consent and decided.

## 2020-09-24 NOTE — ASSESSMENT & PLAN NOTE
Primary prevention is the goal by aggressive risk modification, healthy and therapeutic life style changes for cardiovascular risk reduction. Various goals are discussed and questions answered.

## 2020-09-24 NOTE — LETTER
Josh Osorio  1950  [id number]    Have you had any Chest Pain - No      Have you had any Shortness of Breath - No      Have you had any dizziness - No      Have you had any palpitations - No      Is the patient on any of the following medications -   If Yes DO EKG    Do you have any edema - no    Do you have a surgery or procedure scheduled in the near future - No      Ask patient if they want to sign up for [facility] if they are not already signed up    Check to see if we have an E-MAIL on file for the patient    Check medication list thoroughly!!!  BE SURE TO ASK PATIENT IF THEY NEED MEDICATION REFILLS

## 2020-09-24 NOTE — PATIENT INSTRUCTIONS
shower or bath and do not apply powder or lotion to the skin prior to testing, as the electrodes will adhere better giving us a clearer visual EKG recording.    ? DO NOT TAKE BETA-BLOCKERS 24 HOURS PRIOR TO TESTING SUCH AS:

## 2020-09-24 NOTE — PROGRESS NOTES
Applied @ 48hr holter w/monitor#[id number] for Dx of Psvt . Educated pt on proper holter usage; how to keep sx diary; & when to bring monitor back to office. Pt voiced understanding. Holter order,including monitor & card#, & time started, to front nurse's station in Dr. Colin Kirkland in-box.

## 2020-09-24 NOTE — PROGRESS NOTES
CARDIOLOGY CONSULTATION    Laura Koenig  1950    Dear Dr. Rita Gottron, MD    Thank you for asking me to participate in care of Laura Koenig    Chief Complaint   Patient presents with    Hypertension     Patient states he takes lisinopril 10 mg.     Hyperlipidemia     Patient states he takes Lipitor 20mg.  Other     Patient states he kolb snot drink alcohol or caffine and also does not smoke. Patient states he walks a lot at his job but does not exercise out side of work at this time.  Diabetes     Patient states he takes metformin 061IX and trulicity.  New Patient     Patient was refferal by Claudia Weaver for SVT. History of present illness:  Laura Koenig is a 77 y.o. male is seeing me for the first time for further evaluation of palpitations. on June 2 he was working in in his home and he heard a big noise outside the room and he looked through the window in the backyard to see what is going on. There was an error next to the window and he saw prominent pulsations in the neck which scared him. He denies dizziness syncope or near-syncope. He denies any chest pains. He has no cardiac history. He went to emergency room and EKG showed SVT at a rate of 155 bpm.he was given 6 mg of adenosine and converted to sinus rhythm. Hospital records are reviewed. He has some anxiety and has known hypertension, diabetes and hyperlipidemia. He has been on medications with fairly well controlled on current regimen. he doesn't smoke. He drinks 2 cups of coffee which he had that morning and drinks alcohol only once a year or so. He does not use any recreational drugs.     REVIEW OF SYSTEMS:   Constitutional: denies generalized weakness  Eyes:  Denies change in visual acuity  HENT:  Denies nasal congestion or sore throat  Respiratory:  Denies cough or shortness of breath  Cardiovascular: as in HPI  GI:  Denies abdominal pain, complains of nausea and vomiting  :  Denies dysuria  Musculoskeletal:  Denies back pain or joint pain  Integument:  Denies rash  Neurologic:  Denies headache, focal weakness or sensory changes  \"Remaining review of systems reviewed and negative. Past medical history:  has a past medical history of Hyperlipidemia, Hypertension, PSVT (paroxysmal supraventricular tachycardia) ([facility] and Type 2 diabetes mellitus ([facility] Past surgical history:  has no past surgical history on file. Social History:   Social History     Tobacco Use    Smoking status: Never Smoker    Smokeless tobacco: Never Used   Substance Use Topics    Alcohol use: Not Currently     Comment: 2-3 beers per year     Family history: family history includes Cancer in his father; Diabetes in his mother and sister; Heart Disease in his mother; High Blood Pressure in his sister; High Cholesterol in his mother and sister. No Known Allergies  Prior to Admission medications    Medication Sig Start Date End Date Taking? Authorizing Provider   Dulaglutide (TRULICITY) 3.33 WN/4.9MW SOPN inject 0.5 milliliters ( 0.75 milligrams ) subcutaneously every 7 days 7/22/20  Yes Italo Martins PA-C   atorvastatin (LIPITOR) 20 MG tablet Take 1 tablet by mouth daily 9/13/19  Yes Shira De La Cruz MD   lisinopril (PRINIVIL;ZESTRIL) 10 MG tablet Take 1 tablet by mouth daily 9/13/19  Yes Shira De La Cruz MD   metFORMIN (GLUCOPHAGE-XR) 500 MG extended release tablet TAKE 2 QD 9/13/19  Yes MD [facility] LANCETS FINE [address] [facility] by Does not apply route   Yes Historical Provider, MD   Glucose Blood (Chuck Jacobs VI) by In Vitro route   Yes Historical Provider, MD   ibuprofen (ADVIL;MOTRIN) 800 MG tablet Take 800 mg by mouth every 6 hours as needed for Pain    Historical Provider, MD     Physical Examination:    Vitals:    09/24/20 0926   BP: 118/70   Pulse: 85   Weight: 208 lb 12.8 oz (94.7 kg)   Height: 6' 3\" (1.905 m)      Body mass index is 26.1 kg/m².   Wt Readings from Last 3 Encounters: or concerns please feel free to contact the dictating provider for clarification

## 2020-09-29 PROCEDURE — 93228 REMOTE 30 DAY ECG REV/REPORT: CPT | Performed by: INTERNAL MEDICINE

## 2020-10-01 RX ORDER — LISINOPRIL 10 MG/1
TABLET ORAL
Qty: 90 TABLET | Refills: 1 | Status: SHIPPED | OUTPATIENT
Start: 2020-10-01 | End: 2021-07-06

## 2020-10-01 RX ORDER — ATORVASTATIN CALCIUM 20 MG/1
TABLET, FILM COATED ORAL
Qty: 90 TABLET | Refills: 1 | Status: ON HOLD | OUTPATIENT
Start: 2020-10-01 | End: 2021-02-23 | Stop reason: SDUPTHER

## 2020-10-01 RX ORDER — METFORMIN HYDROCHLORIDE 500 MG/1
TABLET, EXTENDED RELEASE ORAL
Qty: 180 TABLET | Refills: 1 | Status: SHIPPED | OUTPATIENT
Start: 2020-10-01 | End: 2021-10-08

## 2020-10-06 ENCOUNTER — PROCEDURE VISIT (OUTPATIENT)
Dept: CARDIOLOGY CLINIC | Age: 70
End: 2020-10-06
Payer: COMMERCIAL

## 2020-10-06 LAB
LV EF: 58 %
LVEF MODALITY: NORMAL

## 2020-10-06 PROCEDURE — 93306 TTE W/DOPPLER COMPLETE: CPT | Performed by: INTERNAL MEDICINE

## 2020-10-06 PROCEDURE — 93015 CV STRESS TEST SUPVJ I&R: CPT | Performed by: INTERNAL MEDICINE

## 2020-10-08 ENCOUNTER — TELEPHONE (OUTPATIENT)
Dept: CARDIOLOGY CLINIC | Age: 70
End: 2020-10-08

## 2020-10-08 NOTE — TELEPHONE ENCOUNTER
LM on VM of normal results. Patient to call with any questions. Left ventricular function is normal, EF is estimated at 55-60%. Mild left ventricular hypertrophy with Grade I diastolic dysfunction. No regional wall motion abnormalities were detected. Mild tricuspid regurgitation. No significant valvular disease noted. No evidence of pericardial effusion. Normal stress test . completed 10.1 METS and 9 Mins of exercise   Appropriate hemodynamic response to exercise. No significant ST T wave   changes with exercise. EKG portion is negative for ischemia by diagnostic   criteria. Peak BP was 182/80 , Peak HR was 155 . No chest pain with exercise   Sy Score is 9 ( Low Risk)

## 2020-10-21 ENCOUNTER — OFFICE VISIT (OUTPATIENT)
Dept: CARDIOLOGY CLINIC | Age: 70
End: 2020-10-21
Payer: COMMERCIAL

## 2020-10-21 VITALS
WEIGHT: 208.8 LBS | BODY MASS INDEX: 25.96 KG/M2 | HEART RATE: 78 BPM | HEIGHT: 75 IN | DIASTOLIC BLOOD PRESSURE: 80 MMHG | SYSTOLIC BLOOD PRESSURE: 120 MMHG

## 2020-10-21 PROCEDURE — 99213 OFFICE O/P EST LOW 20 MIN: CPT | Performed by: INTERNAL MEDICINE

## 2020-10-21 PROCEDURE — 2022F DILAT RTA XM EVC RTNOPTHY: CPT | Performed by: INTERNAL MEDICINE

## 2020-10-21 PROCEDURE — 1123F ACP DISCUSS/DSCN MKR DOCD: CPT | Performed by: INTERNAL MEDICINE

## 2020-10-21 PROCEDURE — G8427 DOCREV CUR MEDS BY ELIG CLIN: HCPCS | Performed by: INTERNAL MEDICINE

## 2020-10-21 PROCEDURE — G8417 CALC BMI ABV UP PARAM F/U: HCPCS | Performed by: INTERNAL MEDICINE

## 2020-10-21 PROCEDURE — 4040F PNEUMOC VAC/ADMIN/RCVD: CPT | Performed by: INTERNAL MEDICINE

## 2020-10-21 PROCEDURE — 3017F COLORECTAL CA SCREEN DOC REV: CPT | Performed by: INTERNAL MEDICINE

## 2020-10-21 PROCEDURE — G8484 FLU IMMUNIZE NO ADMIN: HCPCS | Performed by: INTERNAL MEDICINE

## 2020-10-21 PROCEDURE — 1036F TOBACCO NON-USER: CPT | Performed by: INTERNAL MEDICINE

## 2020-10-21 PROCEDURE — 3044F HG A1C LEVEL LT 7.0%: CPT | Performed by: INTERNAL MEDICINE

## 2020-10-21 NOTE — LETTER
Rajwinder Mays  1950  [id number]    Have you had any Chest Pain - No    Have you had any Shortness of Breath - No      Have you had any dizziness - No      Have you had any palpitations - No      Is the patient on any of the following medications -   If Yes DO EKG    Do you have any edema - no    Do you have a surgery or procedure scheduled in the near future - No      Ask patient if they want to [facility] if they are not already signed up    Check to see if we have an E-MAIL on file for the patient    Check medication list thoroughly!!!  BE SURE TO ASK PATIENT IF THEY NEED MEDICATION REFILLS    At check out add to every patient's \"wrap up\" the following dot phrase AFTERHOURSEDUCATION and ensure we explain this to our patients

## 2020-10-21 NOTE — ASSESSMENT & PLAN NOTE
Patient had no recurrence. I would consider EP referral if he has recurrent SVT otherwise minimize caffeine, alcohol, nicotine, energy drinks.

## 2020-10-21 NOTE — PROGRESS NOTES
Ethan Marcus LANCETS FINE MISC by Does not apply route   Yes Historical Provider, MD   Glucose Blood (ONETOUCH VERIO VI) by In Vitro route   Yes Historical Provider, MD     Vitals:    10/21/20 0828   BP: 120/80   Pulse: 78   Weight: 208 lb 12.8 oz (94.7 kg)   Height: 6' 3\" (1.905 m)      Body mass index is 26.1 kg/m². Wt Readings from Last 3 Encounters:   10/21/20 208 lb 12.8 oz (94.7 kg)   09/24/20 208 lb 12.8 oz (94.7 kg)   09/11/20 205 lb (93 kg)     Cardiovascular: Auscultation: Normal S1 and S2. No murmurs or gallops noted. Carotids are negative for bruits. Abdominal aorta is is nonpalpable. no epigastric bruit noted. Peripheral pulses: pedal pulses are 2+ equal in both  Respiratory:  Respiratory effort is normal.  Breath sounds are . Auscultation  Extremities:  No edema, clubbing,  Cyanosis, petechiae. SKIN: Warm and well perfused, no pallor or cyanosis  Abdomen:  No masses or tenderness. No organomegaly noted. Musculoskeletal:  no spinal deformities noted. Gait is normal.  Muscle strength is normal  Neurologic:  Oriented to time, place, and person and non-anxious. No focal neurological deficit noted. Psychiatric: Normal mood and effect.        24-hour Holter monitoring done on 9/24/2020 reported normal sinus rhythm average rate is 77 bpm.  Lowest rate of 58 bpm occurred at 7:44 PM and the fastest rate of 117 bpm occurred at 8:38 AM.  2 isolated PVCs are noted without any significant supraventricular arrhythmias. Patient submitted the diary but did not report any activities or symptoms. Conclusion: Normal Holter findings suggesting normal sinus rhythm and no clinically significant arrhythmias. Echocardiogram on 10/6/2020 reported  Left ventricular function is normal, EF is estimated at 55-60%. Mild left ventricular hypertrophy with Grade I diastolic dysfunction. No regional wall motion abnormalities were detected. Mild tricuspid regurgitation.    No significant valvular disease noted. No evidence of pericardial effusion. Nuclear stress test on 10/6/2020 reported   Normal stress test . completed 10.1 METS and 9 Mins of exercise   Appropriate hemodynamic response to exercise. No significant ST T wave   changes with exercise. EKG portion is negative for ischemia by diagnostic   criteria. Peak BP was 182/80 , Peak HR was 155 . No chest pain with exercise   Sy Score is 9 ( Low Risk)      LAB REVIEW:  CBC:   Lab Results   Component Value Date    WBC 8.1 06/02/2020    HGB 15.4 06/02/2020    HCT 48.7 06/02/2020     06/02/2020     Lipids:   Lab Results   Component Value Date    CHOL 132 09/13/2019    TRIG 200 (H) 09/13/2019    HDL 42 09/13/2019    LDLCALC 50 09/13/2019     Renal:   Lab Results   Component Value Date    BUN 13 06/02/2020    CREATININE 1.2 06/02/2020     06/02/2020    K 5.0 06/02/2020     PT/INR: No results found for: INR  Lab Results   Component Value Date    LABA1C 6.4 09/11/2020     The 10-year ASCVD risk score (Anni Todd, et al., 2013) is: 27.3%    Values used to calculate the score:      Age: 72 years      Sex: Male      Is Non- : Yes      Diabetic: Yes      Tobacco smoker: No      Systolic Blood Pressure: 623 mmHg      Is BP treated: Yes      HDL Cholesterol: 42 mg/dL      Total Cholesterol: 132 mg/dL    IMPRESSION and RECOMMENDATIONS:      ASCVD (arteriosclerotic cardiovascular disease)  Continue aggressive risk factor modification and lifestyle changes for primary prevention. Hyperlipidemia  Well-controlled on Lipitor 20 mg daily. Continue the same. Last LDL was 50. Hypertension  Fairly well controlled on lisinopril 10 mg daily. Continue the same. PSVT (paroxysmal supraventricular tachycardia) ([address]  Patient had no recurrence. I would consider EP referral if he has recurrent SVT otherwise minimize caffeine, alcohol, nicotine, energy drinks.     Type 2 diabetes mellitus (HCC)  Last hemoglobin was 6.4 suggesting diabetes is fairly well controlled. Follow up with PCP. Continue current cardiovascular medications which have been reviewed and discussed individually with you. Avoid caffeine, alcohol, energy drinks and stress. Appropriate prescriptions if needed on this visit are addressed. After visit summery is provided. Questions answered and patient verbalizes understanding. Follow up in 12 months with ECG,  sooner if needed. Erica Murray MD, 10/21/2020 8:51 AM     Please note this report has been partially produced using speech recognition software and may contain errors related to that system including errors in grammar, punctuation, and spelling, as well as words and phrases that may be inappropriate. If there are any questions or concerns please feel free to contact the dictating provider for clarification.

## 2020-10-21 NOTE — PATIENT INSTRUCTIONS
Continue current cardiovascular medications which have been reviewed and discussed individually with you. Avoid caffeine, alcohol, energy drinks and stress. Appropriate prescriptions if needed on this visit are addressed. After visit summery is provided. Questions answered and patient verbalizes understanding. Follow up in 12 months with ECG,  sooner if needed.

## 2020-12-28 RX ORDER — BLOOD SUGAR DIAGNOSTIC
STRIP MISCELLANEOUS
Qty: 100 STRIP | Refills: 5 | Status: SHIPPED | OUTPATIENT
Start: 2020-12-28

## 2021-01-04 DIAGNOSIS — E11.9 TYPE 2 DIABETES MELLITUS WITHOUT COMPLICATION, WITHOUT LONG-TERM CURRENT USE OF INSULIN (HCC): Primary | ICD-10-CM

## 2021-01-04 RX ORDER — DULAGLUTIDE 0.75 MG/.5ML
INJECTION, SOLUTION SUBCUTANEOUS
Qty: 5 PEN | Refills: 1 | Status: SHIPPED | OUTPATIENT
Start: 2021-01-04 | End: 2021-03-01 | Stop reason: SINTOL

## 2021-01-04 RX ORDER — BLOOD-GLUCOSE METER
1 KIT MISCELLANEOUS DAILY
Qty: 1 DEVICE | Refills: 0 | Status: SHIPPED | OUTPATIENT
Start: 2021-01-04 | End: 2021-05-20

## 2021-01-04 RX ORDER — BLOOD-GLUCOSE METER
1 KIT MISCELLANEOUS DAILY
Qty: 100 EACH | Refills: 3 | Status: SHIPPED | OUTPATIENT
Start: 2021-01-04

## 2021-01-04 NOTE — TELEPHONE ENCOUNTER
Pt one touch strips verio is no longer covered through his insurance and needs a whole new meter and test strips that is covered though is insurance.  Thanks

## 2021-01-21 ENCOUNTER — OFFICE VISIT (OUTPATIENT)
Dept: FAMILY MEDICINE CLINIC | Age: 71
End: 2021-01-21
Payer: COMMERCIAL

## 2021-01-21 VITALS
WEIGHT: 211 LBS | TEMPERATURE: 97.9 F | HEART RATE: 96 BPM | SYSTOLIC BLOOD PRESSURE: 160 MMHG | BODY MASS INDEX: 26.24 KG/M2 | DIASTOLIC BLOOD PRESSURE: 90 MMHG | HEIGHT: 75 IN

## 2021-01-21 DIAGNOSIS — Z86.79 PERSONAL HISTORY OF SUPRAVENTRICULAR TACHYCARDIA: Primary | ICD-10-CM

## 2021-01-21 DIAGNOSIS — R00.2 PALPITATIONS: ICD-10-CM

## 2021-01-21 DIAGNOSIS — K64.9 BLEEDING HEMORRHOIDS: ICD-10-CM

## 2021-01-21 DIAGNOSIS — I10 ESSENTIAL HYPERTENSION: ICD-10-CM

## 2021-01-21 DIAGNOSIS — R42 EPISODIC LIGHTHEADEDNESS: ICD-10-CM

## 2021-01-21 PROCEDURE — 99214 OFFICE O/P EST MOD 30 MIN: CPT | Performed by: PHYSICIAN ASSISTANT

## 2021-01-21 PROCEDURE — 1111F DSCHRG MED/CURRENT MED MERGE: CPT | Performed by: PHYSICIAN ASSISTANT

## 2021-01-21 RX ORDER — IBUPROFEN 800 MG/1
800 TABLET ORAL EVERY 6 HOURS PRN
Qty: 120 TABLET | Refills: 0 | Status: SHIPPED | OUTPATIENT
Start: 2021-01-21

## 2021-01-21 ASSESSMENT — PATIENT HEALTH QUESTIONNAIRE - PHQ9
1. LITTLE INTEREST OR PLEASURE IN DOING THINGS: 0
SUM OF ALL RESPONSES TO PHQ QUESTIONS 1-9: 0
SUM OF ALL RESPONSES TO PHQ QUESTIONS 1-9: 0

## 2021-01-21 NOTE — PROGRESS NOTES
1/21/2021    Andrés Lazaro    Chief Complaint   Patient presents with    Follow-Up from [address] [place] [address]     ER at [facility] on 1/20/21 for tachycardia - pt reports he got dizzy and weak, felt faint    Hypertension     pt bp 160/90 - pt reports he did not sedrick BP meds today       HPI  History obtained from the patient. [facility] is a 72 y.o. male who presents today for ER follow-up. The patient was seen at [facility] ED on 1/20/2021 for an episode of lightheadedness with palpitations. The patient states that in June this year, he noticed his chest visibly pulsating and thought this was due to a muscle twitch, but he was evaluated and found to be in SVT. The patient states that he saw a cardiologist, Dr. Damion Julian, for this in September 2020 and was not started on any medication. He was instructed to follow up in one year in Sept 2021. The patient states that yesterday, he developed dizziness/lightheadedness and palpitations while at work (he works at [employer]). He was brought to the ER and transferred to [facility]. He notes that he has felt great since discharge - denies further symptoms of lightheadedness or palpitations. The patient does note that he has been having issues with hemorrhoids, which he thinks are both on the inside and the outside. Admits occasional BRBPR. He states that he takes ibuprofen for pain associated with these. Patient requests referral to GI for evaluation of these. The patient is compliant with Lisinopril 10 mg daily but notes that he did not take his medication this morning. REVIEW OF SYMPTOMS  Review of Systems   Constitutional: Negative for chills and fever. Respiratory: Negative for cough and shortness of breath. Gastrointestinal: Negative for diarrhea and vomiting. PAST MEDICAL HISTORY  Past Medical History:   Diagnosis Date    H/O echocardiogram 10/06/2020    EF55-60%, Mild TR & LVH.     History of exercise stress test 10/06/2020    Treadmill, inject 0.5 milliliters ( 0.75 milligrams ) subcutaneously every 7 days 5 pen 1    Blood Glucose Monitoring Suppl (FREESTYLE LITE) MELODY 1 Device by Does not apply route daily 1 Device 0    blood glucose test strips (FREESTYLE LITE) strip 1 each by In Vitro route daily As needed. 100 each 3    ONETOUCH VERIO strip TEST TWICE DAILY AND AS NEEDED 100 strip 5    metFORMIN (GLUCOPHAGE-XR) 500 MG extended release tablet take 2 tablets by mouth once daily 180 tablet 1    atorvastatin (LIPITOR) 20 MG tablet take 1 tablet by mouth once daily 90 tablet 1    lisinopril (PRINIVIL;ZESTRIL) 10 MG tablet take 1 tablet by mouth once daily 90 tablet 1    ONETOUCH DELICA LANCETS FINE MISC by Does not apply route      Glucose Blood (ONETOUCH VERIO VI) by In Vitro route       No current facility-administered medications for this visit. ALLERGIES  No Known Allergies    RECENT LABS    Lab Results   Component Value Date    LABA1C 6.4 09/11/2020     Lab Results   Component Value Date    .7 03/13/2020       Lab Results   Component Value Date    CHOL 132 09/13/2019    CHOL 189 03/13/2018     Lab Results   Component Value Date    LDLCALC 50 09/13/2019    LDLCALC 115 03/13/2018       Lab Results   Component Value Date    WBC 8.1 06/02/2020    HGB 15.4 06/02/2020    HCT 48.7 06/02/2020    MCV 84.7 06/02/2020     06/02/2020       PHYSICAL EXAM  BP (!) 160/90   Pulse 96   Temp 97.9 °F (36.6 °C)   Ht 6' 3\" (1.905 m)   Wt 211 lb (95.7 kg)   BMI 26.37 kg/m²     Physical Exam  Constitutional:       Appearance: Normal appearance. HENT:      Head: Normocephalic and atraumatic. Eyes:      Comments: EOM grossly intact. Cardiovascular:      Rate and Rhythm: Normal rate and regular rhythm. Heart sounds: No murmur. No friction rub. No gallop. Pulmonary:      Effort: Pulmonary effort is normal.      Breath sounds: Normal breath sounds. No wheezing, rhonchi or rales. Skin:     General: Skin is warm and dry. Neurological:      Mental Status: He is alert and oriented to person, place, and time. Comments: Cranial nerves II-XII grossly intact   Psychiatric:         Mood and Affect: Mood normal.         Behavior: Behavior normal.         ASSESSMENT & PLAN  1. Episodic lightheadedness  Lab work unremarkable. Patient has a history of SVT. Instructed the patient to call his cardiologist, Dr. Suzon Duverney, and schedule an appointment with him. - [place] DISCHARGE MEDS RECONCILED W/ CURRENT OUTPATIENT MED LIST    2. Palpitations  See above (#1).  - [place] DISCHARGE MEDS RECONCILED W/ CURRENT OUTPATIENT MED LIST    3. Personal history of supraventricular tachycardia  See above (#1).  - [place] DISCHARGE MEDS RECONCILED W/ CURRENT OUTPATIENT MED LIST    4. Essential hypertension  Instructed the patient to record his blood pressure once daily for the next 2 weeks. I will follow-up with him in 2 weeks and review his log and advise based on findings. 5. Bleeding hemorrhoids  Offered to prescribe creams or suppositories, but the patient politely declined this. He would like to see GI. Referred the patient to GI for evaluation.  - Anat Vazquez CNP, Gastroenterology, Get Blood          Return in 2 weeks (on 2/4/2021).             Electronically signed by Susanna Garcia PA-C on 1/21/2021

## 2021-01-21 NOTE — PATIENT INSTRUCTIONS
[facility] - Jenifer Trevino, CNP  [address], [address]  [place], [address]  [phone]    Hemorrhoids   1. 20 to 30 g of insoluble fiber per day and drink plenty of water (1.5 to 2 liters per day) - May use Metamucil, Citrucel, or Benefiber  2. Regular Exercise  3. As needed Docusate Sodium (Colace)100 to 200 mg orally two times per day  4. Warm Sitz Baths  5. Refrain from straining or lingering (eg, reading) on the toilet  6. Topical astringent and protectant - Witch hazel (eg, Tucks, Preparation H pads) or Zinc oxide topical paste (eg, Sandrine's Butt Paste, Desitin)             Patient Education        Hemorrhoids: Care Instructions  Your Care Instructions     Hemorrhoids are enlarged veins that develop in the anal canal. Bleeding during bowel movements, itching, swelling, and rectal pain are the most common symptoms. They can be uncomfortable at times, but hemorrhoids rarely are a serious problem. You can treat most hemorrhoids with simple changes to your diet and bowel habits. These changes include eating more fiber and not straining to pass stools. Most hemorrhoids do not need surgery or other treatment unless they are very large and painful or bleed a lot. Follow-up care is a key part of your treatment and safety. Be sure to make and go to all appointments, and call your doctor if you are having problems. It's also a good idea to know your test results and keep a list of the medicines you take. How can you care for yourself at home? · Sit in a few inches of warm water (sitz bath) 3 times a day and after bowel movements. The warm water helps with pain and itching. · Put ice on your anal area several times a day for 10 minutes at a time. Put a thin cloth between the ice and your skin. Follow this by placing a warm, wet towel on the area for another 10 to 20 minutes. · Take pain medicines exactly as directed.   ? If the doctor gave you a prescription medicine for pain, take it as prescribed. ? If you are not taking a prescription pain medicine, ask your doctor if you can take an over-the-counter medicine. · Keep the anal area clean, but be gentle. Use water and a fragrance-free soap, such as [place], or use baby wipes or medicated pads, such as Tucks. · Wear cotton underwear and loose clothing to decrease moisture in the anal area. · Eat more fiber. Include foods such as whole-grain breads and cereals, raw vegetables, raw and dried fruits, and beans. · Drink plenty of fluids, enough so that your urine is light yellow or clear like water. If you have kidney, heart, or liver disease and have to limit fluids, talk with your doctor before you increase the amount of fluids you drink. · Use a stool softener that contains bran or psyllium. You can save money by buying bran or psyllium (available in bulk at most health food stores) and sprinkling it on foods or stirring it into fruit juice. Or you can use a product such as Metamucil or Hydrocil. · Practice healthy bowel habits. ? Go to the bathroom as soon as you have the urge. ? Avoid straining to pass stools. Relax and give yourself time to let things happen naturally. ? Do not hold your breath while passing stools. ? Do not read while sitting on the toilet. Get off the toilet as soon as you have finished. · Take your medicines exactly as prescribed. Call your doctor if you think you are having a problem with your medicine. When should you call for help? Call 911 anytime you think you may need emergency care. For example, call if:    · You pass maroon or very bloody stools. Call your doctor now or seek immediate medical care if:    · You have increased pain.     · You have increased bleeding. Watch closely for changes in your health, and be sure to contact your doctor if:    · Your symptoms have not improved after 3 or 4 days. Where can you learn more?   Go to [email or web address] and sign in to your [employer] account. Enter O129 in the [place] box to learn more about \"Hemorrhoids: Care Instructions. \"     If you do not have an account, please click on the \"Sign Up Now\" link. Current as of: April 15, 2020               Content Version: 12.6  © 1035-8968 [employer]. Care instructions adapted under license by [facility] ([facility]). If you have questions about a medical condition or this instruction, always ask your healthcare professional. [address] any warranty or liability for your use of this information.

## 2021-01-26 ENCOUNTER — TELEPHONE (OUTPATIENT)
Dept: GASTROENTEROLOGY | Age: 71
End: 2021-01-26

## 2021-01-27 ENCOUNTER — OFFICE VISIT (OUTPATIENT)
Dept: CARDIOLOGY CLINIC | Age: 71
End: 2021-01-27
Payer: COMMERCIAL

## 2021-01-27 VITALS
DIASTOLIC BLOOD PRESSURE: 78 MMHG | WEIGHT: 208.4 LBS | HEART RATE: 86 BPM | BODY MASS INDEX: 25.91 KG/M2 | SYSTOLIC BLOOD PRESSURE: 132 MMHG | HEIGHT: 75 IN

## 2021-01-27 DIAGNOSIS — E11.00 TYPE 2 DIABETES MELLITUS WITH HYPEROSMOLARITY WITHOUT COMA, WITHOUT LONG-TERM CURRENT USE OF INSULIN (HCC): ICD-10-CM

## 2021-01-27 DIAGNOSIS — I10 ESSENTIAL HYPERTENSION: ICD-10-CM

## 2021-01-27 DIAGNOSIS — I25.10 ASCVD (ARTERIOSCLEROTIC CARDIOVASCULAR DISEASE): ICD-10-CM

## 2021-01-27 DIAGNOSIS — I47.1 PSVT (PAROXYSMAL SUPRAVENTRICULAR TACHYCARDIA) (HCC): Primary | ICD-10-CM

## 2021-01-27 DIAGNOSIS — E78.00 PURE HYPERCHOLESTEROLEMIA: ICD-10-CM

## 2021-01-27 PROCEDURE — 4040F PNEUMOC VAC/ADMIN/RCVD: CPT | Performed by: INTERNAL MEDICINE

## 2021-01-27 PROCEDURE — 99214 OFFICE O/P EST MOD 30 MIN: CPT | Performed by: INTERNAL MEDICINE

## 2021-01-27 PROCEDURE — G8484 FLU IMMUNIZE NO ADMIN: HCPCS | Performed by: INTERNAL MEDICINE

## 2021-01-27 PROCEDURE — G8428 CUR MEDS NOT DOCUMENT: HCPCS | Performed by: INTERNAL MEDICINE

## 2021-01-27 PROCEDURE — 2022F DILAT RTA XM EVC RTNOPTHY: CPT | Performed by: INTERNAL MEDICINE

## 2021-01-27 PROCEDURE — G8417 CALC BMI ABV UP PARAM F/U: HCPCS | Performed by: INTERNAL MEDICINE

## 2021-01-27 PROCEDURE — 1036F TOBACCO NON-USER: CPT | Performed by: INTERNAL MEDICINE

## 2021-01-27 PROCEDURE — 3046F HEMOGLOBIN A1C LEVEL >9.0%: CPT | Performed by: INTERNAL MEDICINE

## 2021-01-27 PROCEDURE — 3017F COLORECTAL CA SCREEN DOC REV: CPT | Performed by: INTERNAL MEDICINE

## 2021-01-27 PROCEDURE — 1123F ACP DISCUSS/DSCN MKR DOCD: CPT | Performed by: INTERNAL MEDICINE

## 2021-01-27 NOTE — LETTER
Valery Hall  1950  [id number]    Have you had any Chest Pain that is not new? - No  If Yes DO EKG - How does it feel -    How long does the pain last -      How long have you been having the pain -    Did you take a    And did it relieve the pain - No    ? DO EKG IF: Patient has a Heart Rate above 100 or below 40     CAD (Coronary Artery Disease) patient should have one on file every 6 months        Have you had any Shortness of Breath - No  If Yes - When     Have you had any dizziness - No  If Yes DO ORTHOSTATIC BP - when do you feel dizzy    How long does it last .       ? Sitting wait 5 minutes do supine (laying down) wait 5 minutes then do standing - log each in \"vitals\" area in Epic  ? Be sure to ask what symptoms they are having if they get dizzy while completing ortho stats such as room spinning, nausea, etc.    Have you had any palpitations that are not new? - No  If Yes DO EKG - Do you feel your heart   How long does it last - . Is the patient on any of the following medications -   If Yes DO EKG - Needs done every 6 months    Do you have any edema - swelling in No    If Yes - CHECK TO SEE IF THE EDEMA IS PITTING  How long have they been having edema -   If Yes - Have they worn compression stockings     Vein \"LEG PROBLEM Questionnaire\"  1. Do you have prominent leg veins? 2. Do you have any skin discoloration? 3. Do you have any healed or active sores? 4. Do you have swelling of the legs? 5. Do you have a family history of varicose veins? 6. Does your profession involve pro-longed        standing or heavy lifting? 7. Have you been fighting overweight problems? 8. Do you have restless legs? 9. Do you have any night time cramps? 10. Do you have any of the following in your legs:             Do you have a surgery or procedure scheduled in the near future - No  If Yes- DO EKG  If Yes - Who is the surgery with? Phone number of physician   Fax number of physician   Type of surgery   GIVE THIS INFORMATION TO KAREN RUDOLPH    ? Ask patient if they want to sign up for MyChart if they are not already signed up    ? Check to see if we have an E-MAIL on file for the patient    ? Check medication list thoroughly!!! AND RECONCILE OUTSIDE MEDICATIONS  If dose has changed change the entire order not just the MG  BE SURE TO ASK PATIENT IF THEY NEED MEDICATION REFILLS    ?  At check out add to every patient's \"wrap up\" the following dot phrase AFTERHOURSEDUCATION and ensure we explain this to our patients  **

## 2021-01-27 NOTE — ASSESSMENT & PLAN NOTE
Consult to avoid caffeine and alcohol. He is counseled for Valsalva maneuvers if this happens again. We discussed rapid node study for SVT and details are discussed and he is willing to participate. We will also consult Dr. Paulino Grijalva for EP evaluation and follow-up. He verbalized understanding and asked multiple questions and they have been answered to his satisfaction.

## 2021-01-27 NOTE — PATIENT INSTRUCTIONS
Please be informed that if you contact our office outside of normal business hours the physician on call cannot help with any scheduling or rescheduling issues, procedure instruction questions or any type of medication issue. We advise you for any urgent/emergency that you go to the nearest emergency room! PLEASE CALL OUR OFFICE DURING NORMAL BUSINESS HOURS    Monday - Friday   8 am to 5 pm    [address]: [phone]    [place]:  [phone]  Will consult EP for possible SVT ablation as he had second episode. SVT rapid Node study discussed and he is willing to volunteer for it. Counseled for vagal maneuvers for recurrent SVT and questions answered. Follow-up in 6 months, sooner if needed.

## 2021-01-27 NOTE — PROGRESS NOTES
Zackery Haines (:  1950) is a 72 y.o. male,     Patient is here for further evaluation and follow up for recurrence of SVT. Patient works in housekeeping at [employer] where he started having rapid heartbeat and palpitations denies syncope or near syncope. They told him he was in SVT they gave him medication in the vein and transferred him to [place] emergency room. I do not have access to [facility] records have the documented anything however the time he was at [place] his EKG was in sinus tachycardia and he was feeling better and x-ray showed no acute disease. Patient has history of SVT in the past documented on 2020 and this was her second episode. Otherwise he is very active works for housekeeping and considers his job to be fairly labor-intensive denies any chest pain or shortness of breath with it. He does drink monitoring deal and he had 1 can of it 4 hours prior to the last SVT episode. No Known Allergies  Prior to Admission medications    Medication Sig Start Date End Date Taking? Authorizing Provider   ibuprofen (ADVIL;MOTRIN) 800 MG tablet Take 1 tablet by mouth every 6 hours as needed for Pain 21   Rita Sweeney PA-C   Dulaglutide (TRULICITY) 8.64 MP/4.5UI SOPN inject 0.5 milliliters ( 0.75 milligrams ) subcutaneously every 7 days 21   Yaneth Oleary MD   Blood Glucose Monitoring Suppl (FREESTYLE LITE) MELODY 1 Device by Does not apply route daily 21   Yaneth Oleary MD   blood glucose test strips (FREESTYLE LITE) strip 1 each by In Vitro route daily As needed.  21   Yaneth Oleary MD   [facility] VERIO strip TEST TWICE DAILY AND AS NEEDED 20   Yaneth Oleary MD   metFORMIN (GLUCOPHAGE-XR) 500 MG extended release tablet take 2 tablets by mouth once daily 10/1/20   Yaneth Oleary MD   atorvastatin (LIPITOR) 20 MG tablet take 1 tablet by mouth once daily 10/1/20   Yaneth Oleary MD lisinopril (PRINIVIL;ZESTRIL) 10 MG tablet take 1 tablet by mouth once daily 10/1/20   MD Sybil Sanches LANCETS FINE MISC by Does not apply route    Historical Provider, MD   Glucose Blood (ONETOUCH VERIO VI) by In Vitro route    Historical Provider, MD     Past Medical History:   Diagnosis Date    H/O echocardiogram 10/06/2020    EF55-60%, Mild TR & LVH.  History of exercise stress test 10/06/2020    Treadmill, Normal    Hyperlipidemia     Hypertension     PSVT (paroxysmal supraventricular tachycardia) ([address] 9/24/2020 9/2/2020 ECG at ER at [facility]    Type 2 diabetes mellitus ([address]       Vitals:    01/27/21 0919   BP: 132/78   Pulse: 86   Weight: 208 lb 6.4 oz (94.5 kg)   Height: 6' 3\" (1.905 m)      Body mass index is 26.05 kg/m². Wt Readings from Last 3 Encounters:   01/27/21 208 lb 6.4 oz (94.5 kg)   01/21/21 211 lb (95.7 kg)   10/21/20 208 lb 12.8 oz (94.7 kg)     Constitutional: normally built and nourished pleasant male in no apparent distress  Eyes: with frequent. He wears glasses. He is wearing a facemask  ENT: wearing face mask  NECK: No JVP or thyromegaly  Cardiovascular: Auscultation: Normal S1 and S2. No murmurs or gallops noted. Carotids are negative for bruits. Abdominal aorta is is nonpalpable. no epigastric bruit noted. Peripheral pulses: pedal pulses are 2+ equal in both  Respiratory:  Respiratory effort is normal.  Breath sounds are . Auscultation  Extremities:  No edema, clubbing,  Cyanosis, petechiae. SKIN: Warm and well perfused, no pallor or cyanosis  Abdomen:  No masses or tenderness. No organomegaly noted. Musculoskeletal:  no spinal deformities noted. Gait is normal.  Muscle strength is normal  Neurologic:  Oriented to time, place, and person and non-anxious. No focal neurological deficit noted. Psychiatric: Normal mood and effect.     Pertinent records reviewed and discussed with patient and results are as follow: E[facility] 1/20/2021 at [facility] reported  Normal sinus rhythm at 96 bpm, normal axis, normal intervals, no appreciable ST/T wave abnormalities. Chest Xray reported No acute disease.      Lab Results   Component Value Date    WBC 8.1 06/02/2020    HGB 15.4 06/02/2020    HCT 48.7 06/02/2020     06/02/2020     Lab Results   Component Value Date    CHOL 132 09/13/2019    TRIG 200 (H) 09/13/2019    HDL 42 09/13/2019    LDLCALC 50 09/13/2019     Lab Results   Component Value Date    BUN 13 06/02/2020    CREATININE 1.2 06/02/2020     06/02/2020    K 5.0 06/02/2020     No results found for: INR  Lab Results   Component Value Date    LABA1C 6.4 09/11/2020     ASSESSMENT/PLAN:    1. PSVT 6/2 2020 and 1/20/21  Assessment & Plan:  Consult to avoid caffeine and alcohol. He is counseled for Valsalva maneuvers if this happens again. We discussed rapid node study for SVT and details are discussed and he is willing to participate. We will also consult Dr. Keo De Oliveira for EP evaluation and follow-up. He verbalized understanding and asked multiple questions and they have been answered to his satisfaction. 2. Type 2 diabetes mellitus with hyperosmolarity without coma, without long-term current use of insulin ([address]  3. Essential hypertension  Assessment & Plan:  Fairly well-controlled on current medication. Continue the same. 4. Pure hypercholesterolemia  Assessment & Plan:  Well-controlled on Lipitor 20 mg a day. Last LDL was 50. Continue the same. 5. ASCVD (arteriosclerotic cardiovascular disease)  Assessment & Plan:  Counseled for primary prevention by aggressive risk factor modification. Will consult EP for possible SVT ablation as he had second episode. SVT rapid Node study discussed and he is willing to volunteer for it. Counseled for vagal maneuvers for recurrent SVT and questions answered. Follow-up in 6 months, sooner if needed. An electronic signature was used to authenticate this note. --Bird Levine MD

## 2021-01-29 ENCOUNTER — TELEPHONE (OUTPATIENT)
Dept: CARDIOLOGY CLINIC | Age: 71
End: 2021-01-29

## 2021-02-03 NOTE — PROGRESS NOTES
2/4/2021    Eliceo Gutierres    Chief Complaint   Patient presents with    2 Week Follow-Up    Hypertension     pt states bp has been ok       HPI  History obtained from the patient. Mike Petty is a 72 y.o. male who presents today for 2-week follow-up on hypertension. The patient states that he's not been into work for the last week or so, so he wasn't able to log his blood pressure. However, his blood pressure in the office today is significantly improved from 160/90 down to 128/70. The patient states that he has not had any more episodes of lightheadedness or palpitations. He did follow-up with his cardiologist and was instructed not to drink any caffeine. The patient states that he was sent by his cardiologist to a specialist to be a part of a research study, but he has not read all the paperwork or signed it yet. He still thinking about it. The patient states that GI did call and make an appointment with him to evaluate his hemorrhoids. He has an appointment set up for February 15. REVIEW OF SYMPTOMS  Review of Systems   Constitutional: Negative for chills and fever. Respiratory: Negative for cough and shortness of breath. Gastrointestinal: Negative for diarrhea and vomiting. PAST MEDICAL HISTORY  Past Medical History:   Diagnosis Date    H/O echocardiogram 10/06/2020    EF55-60%, Mild TR & LVH.     History of exercise stress test 10/06/2020    Treadmill, Normal    Hyperlipidemia     Hypertension     PSVT (paroxysmal supraventricular tachycardia) ([address] 9/24/2020 9/2/2020 [facility]    Type 2 diabetes mellitus ([address]        FAMILY HISTORY  Family History   Problem Relation Age of Onset    Diabetes Mother     High Cholesterol Mother     Heart Disease Mother     Cancer Father     Diabetes Sister     High Blood Pressure Sister     High Cholesterol Sister        SOCIAL HISTORY  Social History     Socioeconomic History    Marital status: Single     Spouse name: Not on file   [employer] Number of children: Not on file    Years of education: Not on file    Highest education level: Not on file   Occupational History    Not on file   Social Needs    Financial resource strain: Not on file    Food insecurity     Worry: Not on file     Inability: Not on file    Transportation needs     Medical: Not on file     Non-medical: Not on file   Tobacco Use    Smoking status: Never Smoker    Smokeless tobacco: Never Used   Substance and Sexual Activity    Alcohol use: Not Currently     Comment: 2-3 beers per year/caffeine 2 cups of coffee a week and 4 pops a week    Drug use: No    Sexual activity: Not on file   Lifestyle    Physical activity     Days per week: Not on file     Minutes per session: Not on file    Stress: Not on file   Relationships    Social connections     Talks on phone: Not on file     Gets together: Not on file     Attends Shinto service: Not on file     Active member of club or organization: Not on file     Attends meetings of clubs or organizations: Not on file     Relationship status: Not on file    Intimate partner violence     Fear of current or ex partner: Not on file     Emotionally abused: Not on file     Physically abused: Not on file     Forced sexual activity: Not on file   Other Topics Concern    Not on file   Social History Narrative    Not on file        SURGICAL HISTORY  No past surgical history on file. CURRENT MEDICATIONS  Current Outpatient Medications   Medication Sig Dispense Refill    ibuprofen (ADVIL;MOTRIN) 800 MG tablet Take 1 tablet by mouth every 6 hours as needed for Pain 120 tablet 0    Dulaglutide (TRULICITY) 5.62 ZE/5.7DI SOPN inject 0.5 milliliters ( 0.75 milligrams ) subcutaneously every 7 days 5 pen 1    Blood Glucose Monitoring Suppl (FREESTYLE LITE) MELODY 1 Device by Does not apply route daily 1 Device 0    blood glucose test strips (FREESTYLE LITE) strip 1 each by In Vitro route daily As needed.  100 each 3    ONETOUCH VERIO strip TEST TWICE DAILY AND AS NEEDED 100 strip 5    metFORMIN (GLUCOPHAGE-XR) 500 MG extended release tablet take 2 tablets by mouth once daily 180 tablet 1    atorvastatin (LIPITOR) 20 MG tablet take 1 tablet by mouth once daily 90 tablet 1    lisinopril (PRINIVIL;ZESTRIL) 10 MG tablet take 1 tablet by mouth once daily 90 tablet 1    ONETOUCH DELICA LANCETS FINE MISC by Does not apply route      Glucose Blood (ONETOUCH VERIO VI) by In Vitro route       No current facility-administered medications for this visit. ALLERGIES  No Known Allergies    RECENT LABS    Lab Results   Component Value Date    LABA1C 6.4 09/11/2020     Lab Results   Component Value Date    .7 03/13/2020       Lab Results   Component Value Date    CHOL 132 09/13/2019    CHOL 189 03/13/2018     Lab Results   Component Value Date    LDLCALC 50 09/13/2019    LDLCALC 115 03/13/2018       Lab Results   Component Value Date    WBC 8.1 06/02/2020    HGB 15.4 06/02/2020    HCT 48.7 06/02/2020    MCV 84.7 06/02/2020     06/02/2020       PHYSICAL EXAM  /70   Pulse 87   Temp 98.2 °F (36.8 °C)   Ht 6' 3\" (1.905 m)   Wt 206 lb 12.8 oz (93.8 kg)   SpO2 99%   BMI 25.85 kg/m²     Physical Exam  Constitutional:       Appearance: Normal appearance. HENT:      Head: Normocephalic and atraumatic. Eyes:      Comments: EOM grossly intact. Cardiovascular:      Rate and Rhythm: Normal rate and regular rhythm. Heart sounds: No murmur. No friction rub. No gallop. Pulmonary:      Effort: Pulmonary effort is normal.      Breath sounds: Normal breath sounds. No wheezing, rhonchi or rales. Skin:     General: Skin is warm and dry. Neurological:      Mental Status: He is alert and oriented to person, place, and time. Comments: Cranial nerves II-XII grossly intact   Psychiatric:         Mood and Affect: Mood normal.         Behavior: Behavior normal.         ASSESSMENT & PLAN  1.  Essential hypertension  Significant improvement in blood pressure today in the office. Encouraged the patient to continue to keep an eye on this and let us know if it gets consistently greater than 130/80. Continue with lisinopril 10 mg daily for now    2. PSVT 6/2 2020 and 1/20/21  Patient followed up with cardiology. He has stopped drinking caffeine. 3. Hemorrhoids, unspecified hemorrhoid type  Patient has an appointment set up with GI. No follow-ups on file.             Electronically signed by Carolynn Spring PA-C on 2/4/2021

## 2021-02-04 ENCOUNTER — OFFICE VISIT (OUTPATIENT)
Dept: FAMILY MEDICINE CLINIC | Age: 71
End: 2021-02-04
Payer: COMMERCIAL

## 2021-02-04 VITALS
HEART RATE: 87 BPM | SYSTOLIC BLOOD PRESSURE: 128 MMHG | DIASTOLIC BLOOD PRESSURE: 70 MMHG | HEIGHT: 75 IN | OXYGEN SATURATION: 99 % | TEMPERATURE: 98.2 F | WEIGHT: 206.8 LBS | BODY MASS INDEX: 25.71 KG/M2

## 2021-02-04 DIAGNOSIS — I10 ESSENTIAL HYPERTENSION: Primary | ICD-10-CM

## 2021-02-04 DIAGNOSIS — I47.1 PSVT (PAROXYSMAL SUPRAVENTRICULAR TACHYCARDIA) (HCC): ICD-10-CM

## 2021-02-04 DIAGNOSIS — K64.9 HEMORRHOIDS, UNSPECIFIED HEMORRHOID TYPE: ICD-10-CM

## 2021-02-04 PROCEDURE — G8417 CALC BMI ABV UP PARAM F/U: HCPCS | Performed by: PHYSICIAN ASSISTANT

## 2021-02-04 PROCEDURE — 1123F ACP DISCUSS/DSCN MKR DOCD: CPT | Performed by: PHYSICIAN ASSISTANT

## 2021-02-04 PROCEDURE — 4040F PNEUMOC VAC/ADMIN/RCVD: CPT | Performed by: PHYSICIAN ASSISTANT

## 2021-02-04 PROCEDURE — 1036F TOBACCO NON-USER: CPT | Performed by: PHYSICIAN ASSISTANT

## 2021-02-04 PROCEDURE — 3017F COLORECTAL CA SCREEN DOC REV: CPT | Performed by: PHYSICIAN ASSISTANT

## 2021-02-04 PROCEDURE — 99214 OFFICE O/P EST MOD 30 MIN: CPT | Performed by: PHYSICIAN ASSISTANT

## 2021-02-04 PROCEDURE — G8427 DOCREV CUR MEDS BY ELIG CLIN: HCPCS | Performed by: PHYSICIAN ASSISTANT

## 2021-02-04 PROCEDURE — G8484 FLU IMMUNIZE NO ADMIN: HCPCS | Performed by: PHYSICIAN ASSISTANT

## 2021-02-12 ENCOUNTER — INITIAL CONSULT (OUTPATIENT)
Dept: CARDIOLOGY CLINIC | Age: 71
End: 2021-02-12
Payer: COMMERCIAL

## 2021-02-12 VITALS
OXYGEN SATURATION: 98 % | BODY MASS INDEX: 25.86 KG/M2 | SYSTOLIC BLOOD PRESSURE: 132 MMHG | TEMPERATURE: 97.1 F | HEIGHT: 75 IN | WEIGHT: 208 LBS | RESPIRATION RATE: 12 BRPM | HEART RATE: 96 BPM | DIASTOLIC BLOOD PRESSURE: 82 MMHG

## 2021-02-12 DIAGNOSIS — I47.1 PSVT (PAROXYSMAL SUPRAVENTRICULAR TACHYCARDIA) (HCC): Primary | ICD-10-CM

## 2021-02-12 DIAGNOSIS — I10 ESSENTIAL HYPERTENSION: ICD-10-CM

## 2021-02-12 PROCEDURE — 93000 ELECTROCARDIOGRAM COMPLETE: CPT | Performed by: INTERNAL MEDICINE

## 2021-02-12 PROCEDURE — G8417 CALC BMI ABV UP PARAM F/U: HCPCS | Performed by: INTERNAL MEDICINE

## 2021-02-12 PROCEDURE — 99204 OFFICE O/P NEW MOD 45 MIN: CPT | Performed by: INTERNAL MEDICINE

## 2021-02-12 PROCEDURE — G8427 DOCREV CUR MEDS BY ELIG CLIN: HCPCS | Performed by: INTERNAL MEDICINE

## 2021-02-12 PROCEDURE — G8484 FLU IMMUNIZE NO ADMIN: HCPCS | Performed by: INTERNAL MEDICINE

## 2021-02-12 RX ORDER — DILTIAZEM HYDROCHLORIDE 120 MG/1
120 CAPSULE, COATED, EXTENDED RELEASE ORAL DAILY
Qty: 30 CAPSULE | Refills: 1 | Status: ON HOLD | OUTPATIENT
Start: 2021-02-12 | End: 2021-02-23 | Stop reason: HOSPADM

## 2021-02-12 NOTE — LETTER
[place] ([place]     Dr. Aga Lopez     PROCEDURE: Electrophysiology Study/Supraventricular Tachycardia Ablation      Date of Procedure: 3/9/21  Time: 1200   Arrival Time: 1000    Patient Name: Segundo Hernandez   : 1950   MRN# [id number]    HOSPITAL: [facility])    Call to [place] at [phone]  2 days before your procedure    x Please have blood work and chest-x-ray done 3/3/21 at [facility], [address] [place] [address] or [facility].    X Please have COVID-19 Test done on 3/3/21 at 1030 at the [facility] You will need to Quarantine yourself until procedure. x Please do not have anything by mouth after midnight prior to or 8 hours  before the procedure.    x You may take your medications with a sip of water in the morning before your procedure or take them with you unless listed below. X Hold Diltiazem (Cardizem), Verapamil (Calan) or Metoprolol 2 days prior to the procedure. X  Hold Metformin 24 hours before the procedure until you may restart metformin 2  days after the procedure on 3/12. IMPORTANT NOTICE TO PATIENTS: Prior Authorization  We will contact your insurance for prior authorization for the CPT code related to the procedure it is the patient's responsibility to contact their insurance to ensure they are following their medical plans provisions or requirements! Patient Signature:  _______________________      Staff: Amanda Yi     Staff Given Instructions:___________________________      [place] [facility]     Dr. Eduardo Arevalo:  Electrophysiology Study/Supraventricular Tachycardia Ablation     Date of Procedure: 3/9/21 Time: 1200 Arrival Time: 1000    Patient Name: Segundo Hernandez   : 1950  MRN# [id number]    Day of Procedure Cath Lab Holding area Pre op Orders:        ? IV peripheral saline lock x 2 sites (at least one inpreferred left arm). ? Type & Screen STAT on arrival.  ? If taking Coumadin, PT INR STAT on arrival day of procedure. ? Insert Rios catheter (male patients >>please use External Condom catheter). ? Female patients <=48years of age >> Please do urine HCG test.  ? Diabetic patients >> Accu check Blood sugar check. ? Document home medications in EPIC and include date and time of last dose.  ? NPO  ? Notify Dr. Ade Anaya of abnormal lab results. ? Chest Prep> Clip hair anterior chest and posterior back. ? Groin Prep> Clip hair bilateral groins. Physician Signature:_____________________Date:__________Time:________                                         [facility] ([facility]) Informed Consent for Anesthesia/Sedation, Surgery, Invasive Procedures, and other High-risk Interventions and Medication use     *This consent is applicable for 30 days following patient signature*    Procedure(s)   [place] authorize, Dr. Areli Soliman   and the associate(s) or assistant(s) of his/her choice, to perform the following procedure(s):Electrophysiology Study/Supraventricular Tachycardia Ablation    I know that unexpected conditions may require additional or different procedures than those above. I authorize the above named practitioner(s) perform these as necessary and desirable. This is based on the practitioners professional judgment. The above named practitioner has discussed the above procedure(s) with me, including:  ? Potential benefits, including likelihood of success of the procedure(s) goals  ? Risks  ? Side effects, risk of death, and risk of infection  ? Any potential problems that might occur during recuperation or healing post-procedure  ? Reasonable alternatives  ? Risks of NOT performing the procedure(s)    I acknowledge that no warranty or guarantee has been made to the results the procedure(s). I consent to the above named practitioner(s) providing additional services to me as deemed reasonable and necessary, including but not limited to:    ? Use of medications for anesthesia or sedation. ? All anesthesia and sedation carry risks. My practitioner has discussed my anticipated anesthesia and/or sedation and the risks of using, risk of not using, benefits, side effects, and alternatives. ? Use of pathology  ? I authorize [facility] ([facility]) to dispose of tissues, specimens or organs when pathology is complete. ? Use of radiology  ? A contrast agent may be required for radiology procedures. My practitioner has advised me of the risks of using, risks of not using, benefits, side effects, and alternatives. ? Observers or use of photography, video/audio recording, or televising of the procedure(s). This is for medical, scientific, or educational purposes. This includes appropriate portions of my body. My identity will not be revealed. ? I consent to release of my social security number and other identifying information to [employer] (FDA), and the supplier/, if I receive tissue, a device, or implant. This is to track the tissue, device, or implant for defect, recall, infection, etc.     ? Use of blood and/or blood products, if needed, through my hospital stay. My practitioner has advised me of the risks of using, risks of not using, benefits, side effects, and alternatives. ___ I do NOT want Blood or Blood products given. (Complete separate  refusal form)    Code Status (karina one):  ___ I do NOT HAVE a DNR order. I am a Full-code.   I will receive CPR, intubation,  chest compressions, medications, and/or other life saving measures if I have a  cardiac or respiratory arrest.    ___ I have a Do Not Resuscitate (DNR)order.   (karina one below) ___  I rescind my DNR for surgery and immediate post-operative period through Phase 2 recovery. This means, for that time period, I will be a Full-code and receive CPR, intubation, chest compressions, medications, and/or other life saving measures, if I have a cardiac or respiratory arrest.    ___ I WANT to keep my DNR in effect during my procedure(s) and immediate post-operative recovery period through Phase 2 recovery. (Complete separate refusal form)     This form has been fully explained to me. I understand its contents. Patients Signature: ___________________________Date: ________  Time: ________    If patient unable to sign, has engaged the [address] [place] [address], is a minor, or has a court-appointed Guardian:  [facility] Representative Name (Print):  ____________________________________      Relationship ([place] one):    Guardian   Parent    Spouse    HCPOA   Child   Sibling  Next-of-Kin Friend    Patients Representative Signature: _______________________________________              Date: ______________  Time: __________    An  was used.  name/ID: _________________________________      [facility] ([facility]) Witness________________________  Date: ________   Time: _________    Physician/Practitioner _______________________  Date: ________   Time: _________           Revision 2017        [facility]     Dr. Lorrin Bear     PATIENT NAME:    Segundo Hernandez                               :   1950    PROCEDURE:  Electrophysiology Study/Supraventricular Tachycardia Ablation       DATE OF PROCEDURE: 3/9/21    DIAGNOSIS:   I47.1 , Z01.810, Z79.0             X MAG    X PHOS       X BMP           X CBC     X    PT      X   PTT                X     Chest x-Ray PA & Lateral View           ? PLEASE CALL ABNORMAL RESULTS TO THE REQUESTING PHYSICIAN? ATTENTION PATIENTS:  You do not have to fast for the lab work. You must go to the [facility], [facility] [address] or [facility]         Physician Signature:________________________Date:_________Time:_________                            [place] ([facility]     Dr. Glady Barthel TO SCHEDULE:    PROCEDURE: Electrophysiology Study/Supraventricular Tachycardia Ablation    Patient Name: Eliel Doctor   : 1950  MRN# [id number]    Home Phone Number: [phone]   Weight:    Wt Readings from Last 3 Encounters:   21 208 lb (94.3 kg)   21 206 lb 12.8 oz (93.8 kg)   21 208 lb 6.4 oz (94.5 kg)        Insurance: Payor: [address] [place] [address] / Plan: [facility] CHOICE PLUS / Product Type: *No Product type* /     Date of Procedure: 3/9/21 Time: 1200 Arrival Time: 1000    Diagnosis:  I47.1 (Supraventricular Tachycardia) Allergies: No Known Allergies     1) Call [address] [place] [address] scheduling [phone]) or Instant Message  CONFIRMED WITH     PHONE OR   INSTANT MESSAGE  2) PREAUTHORIZATION NUMBER:    Spoke to:      From date:     expiration date:        Phillip Bobby

## 2021-02-12 NOTE — PROGRESS NOTES
Electrophysiology Consult Note      Reason for consultation:  svt    Chief complaint : SVT    Referring physician: Alexander Madrigal      Primary care physician: Geovany Fritz MD        History of Present Illness:     Patient is a 52-year-old male with a history of hypertension, hyperlipidemia, diabetes mellitus referred by Dr. Jayson Harris for SVT management. Patient works as housekeeping at [employer] where he started having palpitations and he felt his heart was beating very rapidly. He denied any dizziness or syncope during this time. Patient was then taken to the nearest emergency room and he was given medication and the pain and patient converted back into normal rhythm. Patient reports that he had an EKG at [facility] and then at [facility].  Patient also reports that he has another history previously in June which she had similar kind of experience. These were the only 2 episodes he recollects in last year. He does have off-and-on palpitations. He reports that he is very active in his work and his job is very labor-intensive. Patient denies any chest pain, shortness of breath otherwise. Patient denies any alcohol or caffeine or smoking. Patient reports that he does not exercise daily but he has a physically very active and physically demanding job      Pastmedical history:   Past Medical History:   Diagnosis Date    H/O echocardiogram 10/06/2020    EF55-60%, Mild TR & LVH.     History of exercise stress test 10/06/2020    Treadmill, Normal    Hyperlipidemia     Hypertension     PSVT (paroxysmal supraventricular tachycardia) ([facility] [address] 9/24/2020 9/2/2020 [facility]    Type 2 diabetes mellitus [facility])        Surgical history : no recent surgeries    Family history:   Family History   Problem Relation Age of Onset    Diabetes Mother     High Cholesterol Mother     Heart Disease Mother     Cancer Father     Diabetes Sister  High Blood Pressure Sister     High Cholesterol Sister        Social history :  reports that he has never smoked. He has never used smokeless tobacco. He reports previous alcohol use. He reports that he does not use drugs. No Known Allergies    Current Outpatient Medications on File Prior to Visit   Medication Sig Dispense Refill    ibuprofen (ADVIL;MOTRIN) 800 MG tablet Take 1 tablet by mouth every 6 hours as needed for Pain 120 tablet 0    Dulaglutide (TRULICITY) 5.99 XH/5.3VJ SOPN inject 0.5 milliliters ( 0.75 milligrams ) subcutaneously every 7 days 5 pen 1    Blood Glucose Monitoring Suppl (FREESTYLE LITE) MELODY 1 Device by Does not apply route daily 1 Device 0    blood glucose test strips (FREESTYLE LITE) strip 1 each by In Vitro route daily As needed. 100 each 3    ONETOUCH VERIO strip TEST TWICE DAILY AND AS NEEDED 100 strip 5    metFORMIN (GLUCOPHAGE-XR) 500 MG extended release tablet take 2 tablets by mouth once daily 180 tablet 1    atorvastatin (LIPITOR) 20 MG tablet take 1 tablet by mouth once daily 90 tablet 1    lisinopril (PRINIVIL;ZESTRIL) 10 MG tablet take 1 tablet by mouth once daily 90 tablet 1    ONETOUCH DELICA LANCETS FINE MISC by Does not apply route      Glucose Blood (ONETOUCH VERIO VI) by In Vitro route       No current facility-administered medications on file prior to visit. Review of Systems:   Review of Systems   Constitutional: Negative for activity change, chills, fatigue and fever. HENT: Negative for congestion, ear pain and tinnitus. Eyes: Negative for photophobia, pain and visual disturbance. Respiratory: Negative for cough, chest tightness, shortness of breath and wheezing. Cardiovascular: Positive for palpitations. Negative for chest pain and leg swelling. Gastrointestinal: Negative for abdominal pain, blood in stool, constipation, diarrhea, nausea and vomiting. Endocrine: Negative for cold intolerance and heat intolerance.    Genitourinary:

## 2021-02-15 ENCOUNTER — OFFICE VISIT (OUTPATIENT)
Dept: GASTROENTEROLOGY | Age: 71
End: 2021-02-15
Payer: MEDICARE

## 2021-02-15 VITALS
OXYGEN SATURATION: 97 % | SYSTOLIC BLOOD PRESSURE: 132 MMHG | BODY MASS INDEX: 25.33 KG/M2 | HEART RATE: 87 BPM | TEMPERATURE: 97.8 F | HEIGHT: 75 IN | WEIGHT: 203.7 LBS | RESPIRATION RATE: 16 BRPM | DIASTOLIC BLOOD PRESSURE: 84 MMHG

## 2021-02-15 DIAGNOSIS — Z80.0 FAMILY HISTORY OF COLON CANCER IN FATHER: ICD-10-CM

## 2021-02-15 DIAGNOSIS — K59.00 CONSTIPATION, UNSPECIFIED CONSTIPATION TYPE: ICD-10-CM

## 2021-02-15 DIAGNOSIS — K92.1 BLOOD IN STOOL: Primary | ICD-10-CM

## 2021-02-15 PROCEDURE — 4040F PNEUMOC VAC/ADMIN/RCVD: CPT | Performed by: NURSE PRACTITIONER

## 2021-02-15 PROCEDURE — 1036F TOBACCO NON-USER: CPT | Performed by: NURSE PRACTITIONER

## 2021-02-15 PROCEDURE — G8484 FLU IMMUNIZE NO ADMIN: HCPCS | Performed by: NURSE PRACTITIONER

## 2021-02-15 PROCEDURE — G8427 DOCREV CUR MEDS BY ELIG CLIN: HCPCS | Performed by: NURSE PRACTITIONER

## 2021-02-15 PROCEDURE — 3017F COLORECTAL CA SCREEN DOC REV: CPT | Performed by: NURSE PRACTITIONER

## 2021-02-15 PROCEDURE — 1123F ACP DISCUSS/DSCN MKR DOCD: CPT | Performed by: NURSE PRACTITIONER

## 2021-02-15 PROCEDURE — 99203 OFFICE O/P NEW LOW 30 MIN: CPT | Performed by: NURSE PRACTITIONER

## 2021-02-15 PROCEDURE — G8417 CALC BMI ABV UP PARAM F/U: HCPCS | Performed by: NURSE PRACTITIONER

## 2021-02-15 RX ORDER — SODIUM, POTASSIUM,MAG SULFATES 17.5-3.13G
1 SOLUTION, RECONSTITUTED, ORAL ORAL ONCE
Qty: 1 KIT | Refills: 0 | Status: SHIPPED | OUTPATIENT
Start: 2021-02-15 | End: 2021-02-15

## 2021-02-15 RX ORDER — POLYETHYLENE GLYCOL 3350 17 G/17G
17 POWDER, FOR SOLUTION ORAL DAILY
Qty: 510 G | Refills: 3 | Status: SHIPPED | OUTPATIENT
Start: 2021-02-15 | End: 2021-06-15

## 2021-02-15 RX ORDER — DOCUSATE SODIUM 100 MG/1
100 CAPSULE, LIQUID FILLED ORAL 2 TIMES DAILY
Qty: 60 CAPSULE | Refills: 2 | Status: SHIPPED | OUTPATIENT
Start: 2021-02-15 | End: 2021-03-17

## 2021-02-15 ASSESSMENT — ENCOUNTER SYMPTOMS
VOMITING: 0
COLOR CHANGE: 0
BACK PAIN: 0
DIARRHEA: 0
BLOOD IN STOOL: 1
WHEEZING: 0
NAUSEA: 0
CONSTIPATION: 1
EYE PAIN: 0
COUGH: 0
SHORTNESS OF BREATH: 1
ABDOMINAL PAIN: 0
EYE DISCHARGE: 0

## 2021-02-15 NOTE — PROGRESS NOTES
Ben Joseph 72 y.o. male was seen by NIK Singh on 02/15/21     Wt Readings from Last 3 Encounters:   02/15/21 203 lb 11.2 oz (92.4 kg)   02/12/21 208 lb (94.3 kg)   02/04/21 206 lb 12.8 oz (93.8 kg)       NEEL Joseph is a pleasant 72 y.o.  male who presents today for blood in his stools, constipation and family history of colon cancer. He has a past medical history of h/o echocardiogram, history of exercise stress test, hyperlipidemia, hypertension, PSVT (paroxysmal supraventricular tachycardia), and type 2 diabetes mellitus. He has been evaluated by Dr. Calli Mcgraw with discussion of possible ablation for his SVT and he wanted to discuss this with Dr. Percy Marsh before any further testing. His last colonoscopy was done by Dr. Matt Lugo on 3-3-2017 showing hemorrhoids otherwise normal results. He has noticed blood in his stool off and on over the last year. His last episode of blood was noted two weeks ago with small amount streaked in the stool. No active bright red bleeding or melena. He denies changes in his bowel pattern. His typical bowel pattern is twice daily to every two days with hard small to soft brown formed stools. Constipation occurs three times a week. Metamucil helps a little. No diarrhea. No excess belching or flatulence. His appetite is good without early satiety. His weight is stable. No abdominal pain, bloating or distention. No nausea or vomiting. No heartburn or acid reflux. No nocturnal awakenings with acid reflux. No dysphagia or pain with swallowing. His father had colon cancer at age 58. ROS  Review of Systems   Constitutional: Negative for appetite change, chills, diaphoresis, fatigue, fever and unexpected weight change. HENT: Positive for tinnitus (intermittent). Negative for ear pain and hearing loss. Eyes: Negative for pain, discharge and visual disturbance. Respiratory: Positive for shortness of breath (intermittent).  Negative for cough and wheezing. Cardiovascular: Negative for chest pain, palpitations and leg swelling. Heart palpitations last episode three weeks ago   Gastrointestinal: Positive for blood in stool and constipation. Negative for abdominal pain, diarrhea, nausea and vomiting. Endocrine: Negative for cold intolerance and heat intolerance. Genitourinary: Negative for dysuria, frequency, hematuria and urgency. Musculoskeletal: Negative for back pain, myalgias and neck pain. Skin: Negative for color change, pallor and rash. Allergic/Immunologic: Negative for environmental allergies and food allergies. Neurological: Negative for dizziness, seizures, weakness and headaches. Hematological: Does not bruise/bleed easily. Psychiatric/Behavioral: Negative for dysphoric mood and sleep disturbance. The patient is not nervous/anxious. Allergies  No Known Allergies    Medications  Current Outpatient Medications   Medication Sig Dispense Refill    dilTIAZem (CARDIZEM CD) 120 MG extended release capsule Take 1 capsule by mouth daily 30 capsule 1    ibuprofen (ADVIL;MOTRIN) 800 MG tablet Take 1 tablet by mouth every 6 hours as needed for Pain 120 tablet 0    Dulaglutide (TRULICITY) 9.01 YX/9.8BO SOPN inject 0.5 milliliters ( 0.75 milligrams ) subcutaneously every 7 days 5 pen 1    Blood Glucose Monitoring Suppl (FREESTYLE LITE) MELODY 1 Device by Does not apply route daily 1 Device 0    blood glucose test strips (FREESTYLE LITE) strip 1 each by In Vitro route daily As needed.  100 each 3    ONETOUCH VERIO strip TEST TWICE DAILY AND AS NEEDED 100 strip 5    metFORMIN (GLUCOPHAGE-XR) 500 MG extended release tablet take 2 tablets by mouth once daily 180 tablet 1    atorvastatin (LIPITOR) 20 MG tablet take 1 tablet by mouth once daily 90 tablet 1    lisinopril (PRINIVIL;ZESTRIL) 10 MG tablet take 1 tablet by mouth once daily 90 tablet 1    ONETOUCH DELICA LANCETS FINE MISC by Does not apply route      Glucose Blood (ONETOUCH VERIO VI) by In Vitro route       No current facility-administered medications for this visit. Past medical history:   He has a past medical history of H/O echocardiogram, History of exercise stress test, Hyperlipidemia, Hypertension, PSVT (paroxysmal supraventricular tachycardia) ([facility] [address] and Type 2 diabetes mellitus ([facility] [address] Past surgical history:  He has no past surgical history on file. Social History:  He reports that he has never smoked. He has never used smokeless tobacco. He reports previous alcohol use. He reports that he does not use drugs. Family history:  His family history includes Cancer in his father; Diabetes in his mother and sister; Heart Disease in his mother; High Blood Pressure in his sister; High Cholesterol in his mother and sister. Objective    Vitals:    02/15/21 1139   BP: 132/84   Pulse: 87   Resp: 16   Temp: 97.8 °F (36.6 °C)   SpO2: 97%        Physical exam    Physical Exam  Constitutional:       General: He is not in acute distress. Appearance: Normal appearance. He is well-developed. He is not ill-appearing, toxic-appearing or diaphoretic. HENT:      Head: Normocephalic and atraumatic. Nose: Nose normal.      Mouth/Throat:      Mouth: Mucous membranes are moist.   Neck:      Musculoskeletal: Neck supple. Cardiovascular:      Rate and Rhythm: Normal rate and regular rhythm. Pulses: Normal pulses. Heart sounds: Normal heart sounds. No murmur. No friction rub. No gallop. Pulmonary:      Effort: Pulmonary effort is normal.      Breath sounds: Normal breath sounds. Abdominal:      General: Bowel sounds are normal. There is no distension. Palpations: Abdomen is soft. There is no mass. Tenderness: There is no abdominal tenderness. Hernia: No hernia is present. Genitourinary:     Comments: Rectal exam completed no blood noted in anal canal, no protruding hemorrhoids noted.   No anal fissures

## 2021-02-16 ENCOUNTER — TELEPHONE (OUTPATIENT)
Dept: CARDIOLOGY CLINIC | Age: 71
End: 2021-02-16

## 2021-02-16 NOTE — TELEPHONE ENCOUNTER
Tried to reach patient several times. Both numbers going straight to VM now. Asked patient to return call.

## 2021-02-16 NOTE — TELEPHONE ENCOUNTER
Patient calling with Medication questions, he feels that his medication is making his heart race but want to speak to a nurse about it.

## 2021-02-17 NOTE — TELEPHONE ENCOUNTER
Spoke with patient. He wanted to clarify what medication Vianey Carroll started him on. Per note from Vianey Carroll on 2/12, patient was started on Cardizem. Patient also states that he does not feel that he needs EPS/SVT ablation at this time. He states he read through the side effects of his medications and Trulicity states it can cause a rapid heart rate. He would like to try stopping that medication to see if that helps. Advised patient that this office/ can not advise if he can stop that medication. Advised patient to call PCP to see if there is a different medication he can take. Patient voiced understanding. Cancelled procedure with Brian Garnica in hospital scheduling.

## 2021-02-19 ENCOUNTER — HOSPITAL ENCOUNTER (OUTPATIENT)
Age: 71
Setting detail: OBSERVATION
Discharge: HOME | End: 2021-02-23
Attending: EMERGENCY MEDICINE
Payer: COMMERCIAL

## 2021-02-19 ENCOUNTER — APPOINTMENT (OUTPATIENT)
Dept: CT IMAGING | Age: 71
End: 2021-02-19
Payer: COMMERCIAL

## 2021-02-19 ENCOUNTER — APPOINTMENT (OUTPATIENT)
Dept: GENERAL RADIOLOGY | Age: 71
End: 2021-02-19
Payer: COMMERCIAL

## 2021-02-19 DIAGNOSIS — R42 DIZZINESS: Primary | ICD-10-CM

## 2021-02-19 DIAGNOSIS — R11.2 NAUSEA AND VOMITING, INTRACTABILITY OF VOMITING NOT SPECIFIED, UNSPECIFIED VOMITING TYPE: ICD-10-CM

## 2021-02-19 PROBLEM — R29.90 STROKE-LIKE SYMPTOMS: Status: ACTIVE | Noted: 2021-02-19

## 2021-02-19 LAB
ALBUMIN SERPL-MCNC: 3.8 GM/DL (ref 3.4–5)
ALP BLD-CCNC: 72 IU/L (ref 40–129)
ALT SERPL-CCNC: 26 U/L (ref 10–40)
ANION GAP SERPL CALCULATED.3IONS-SCNC: 10 MMOL/L (ref 4–16)
AST SERPL-CCNC: 22 IU/L (ref 15–37)
BASOPHILS ABSOLUTE: 0 K/CU MM
BASOPHILS RELATIVE PERCENT: 0.5 % (ref 0–1)
BILIRUB SERPL-MCNC: 0.5 MG/DL (ref 0–1)
BUN BLDV-MCNC: 14 MG/DL (ref 6–23)
CALCIUM SERPL-MCNC: 8.4 MG/DL (ref 8.3–10.6)
CHLORIDE BLD-SCNC: 100 MMOL/L (ref 99–110)
CO2: 24 MMOL/L (ref 21–32)
CREAT SERPL-MCNC: 1.1 MG/DL (ref 0.9–1.3)
DIFFERENTIAL TYPE: ABNORMAL
EOSINOPHILS ABSOLUTE: 0.2 K/CU MM
EOSINOPHILS RELATIVE PERCENT: 2.3 % (ref 0–3)
GFR AFRICAN AMERICAN: >60 ML/MIN/1.73M2
GFR NON-AFRICAN AMERICAN: >60 ML/MIN/1.73M2
GLUCOSE BLD-MCNC: 199 MG/DL (ref 70–99)
GLUCOSE BLD-MCNC: 231 MG/DL (ref 70–99)
GLUCOSE BLD-MCNC: 238 MG/DL (ref 70–99)
HCT VFR BLD CALC: 47.1 % (ref 42–52)
HEMOGLOBIN: 14.6 GM/DL (ref 13.5–18)
IMMATURE NEUTROPHIL %: 0.9 % (ref 0–0.43)
INR BLD: 0.89 INDEX
LIPASE: 61 IU/L (ref 13–60)
LYMPHOCYTES ABSOLUTE: 4.5 K/CU MM
LYMPHOCYTES RELATIVE PERCENT: 54.7 % (ref 24–44)
MAGNESIUM: 1.9 MG/DL (ref 1.8–2.4)
MCH RBC QN AUTO: 25.8 PG (ref 27–31)
MCHC RBC AUTO-ENTMCNC: 31 % (ref 32–36)
MCV RBC AUTO: 83.4 FL (ref 78–100)
MONOCYTES ABSOLUTE: 0.8 K/CU MM
MONOCYTES RELATIVE PERCENT: 9.7 % (ref 0–4)
NUCLEATED RBC %: 0 %
PDW BLD-RTO: 13.3 % (ref 11.7–14.9)
PLATELET # BLD: 367 K/CU MM (ref 140–440)
PMV BLD AUTO: 9.2 FL (ref 7.5–11.1)
POTASSIUM SERPL-SCNC: 3.2 MMOL/L (ref 3.5–5.1)
PROTHROMBIN TIME: 10.7 SECONDS (ref 11.7–14.5)
RBC # BLD: 5.65 M/CU MM (ref 4.6–6.2)
SEGMENTED NEUTROPHILS ABSOLUTE COUNT: 2.6 K/CU MM
SEGMENTED NEUTROPHILS RELATIVE PERCENT: 31.9 % (ref 36–66)
SODIUM BLD-SCNC: 134 MMOL/L (ref 135–145)
TOTAL IMMATURE NEUTOROPHIL: 0.07 K/CU MM
TOTAL NUCLEATED RBC: 0 K/CU MM
TOTAL PROTEIN: 6.9 GM/DL (ref 6.4–8.2)
TROPONIN T: <0.01 NG/ML
WBC # BLD: 8.2 K/CU MM (ref 4–10.5)

## 2021-02-19 PROCEDURE — 2580000003 HC RX 258: Performed by: NURSE PRACTITIONER

## 2021-02-19 PROCEDURE — 99285 EMERGENCY DEPT VISIT HI MDM: CPT

## 2021-02-19 PROCEDURE — 6370000000 HC RX 637 (ALT 250 FOR IP): Performed by: PHYSICIAN ASSISTANT

## 2021-02-19 PROCEDURE — 93005 ELECTROCARDIOGRAM TRACING: CPT | Performed by: PHYSICIAN ASSISTANT

## 2021-02-19 PROCEDURE — 70498 CT ANGIOGRAPHY NECK: CPT

## 2021-02-19 PROCEDURE — 84484 ASSAY OF TROPONIN QUANT: CPT

## 2021-02-19 PROCEDURE — 6360000002 HC RX W HCPCS: Performed by: PHYSICIAN ASSISTANT

## 2021-02-19 PROCEDURE — 80053 COMPREHEN METABOLIC PANEL: CPT

## 2021-02-19 PROCEDURE — 96374 THER/PROPH/DIAG INJ IV PUSH: CPT

## 2021-02-19 PROCEDURE — 96372 THER/PROPH/DIAG INJ SC/IM: CPT

## 2021-02-19 PROCEDURE — G0378 HOSPITAL OBSERVATION PER HR: HCPCS

## 2021-02-19 PROCEDURE — 2580000003 HC RX 258: Performed by: PHYSICIAN ASSISTANT

## 2021-02-19 PROCEDURE — 85610 PROTHROMBIN TIME: CPT

## 2021-02-19 PROCEDURE — 71045 X-RAY EXAM CHEST 1 VIEW: CPT

## 2021-02-19 PROCEDURE — 85025 COMPLETE CBC W/AUTO DIFF WBC: CPT

## 2021-02-19 PROCEDURE — 82962 GLUCOSE BLOOD TEST: CPT

## 2021-02-19 PROCEDURE — 83690 ASSAY OF LIPASE: CPT

## 2021-02-19 PROCEDURE — 6360000002 HC RX W HCPCS: Performed by: NURSE PRACTITIONER

## 2021-02-19 PROCEDURE — 70450 CT HEAD/BRAIN W/O DYE: CPT

## 2021-02-19 PROCEDURE — 6360000004 HC RX CONTRAST MEDICATION: Performed by: PHYSICIAN ASSISTANT

## 2021-02-19 PROCEDURE — 36415 COLL VENOUS BLD VENIPUNCTURE: CPT

## 2021-02-19 PROCEDURE — 83735 ASSAY OF MAGNESIUM: CPT

## 2021-02-19 PROCEDURE — 93010 ELECTROCARDIOGRAM REPORT: CPT | Performed by: INTERNAL MEDICINE

## 2021-02-19 RX ORDER — 0.9 % SODIUM CHLORIDE 0.9 %
500 INTRAVENOUS SOLUTION INTRAVENOUS ONCE
Status: COMPLETED | OUTPATIENT
Start: 2021-02-19 | End: 2021-02-19

## 2021-02-19 RX ORDER — POLYETHYLENE GLYCOL 3350 17 G/17G
17 POWDER, FOR SOLUTION ORAL DAILY PRN
Status: DISCONTINUED | OUTPATIENT
Start: 2021-02-19 | End: 2021-02-23 | Stop reason: HOSPADM

## 2021-02-19 RX ORDER — ASPIRIN 81 MG/1
81 TABLET ORAL DAILY
Status: DISCONTINUED | OUTPATIENT
Start: 2021-02-19 | End: 2021-02-23 | Stop reason: HOSPADM

## 2021-02-19 RX ORDER — ASPIRIN 300 MG/1
300 SUPPOSITORY RECTAL DAILY
Status: DISCONTINUED | OUTPATIENT
Start: 2021-02-19 | End: 2021-02-23 | Stop reason: HOSPADM

## 2021-02-19 RX ORDER — DILTIAZEM HYDROCHLORIDE 120 MG/1
120 CAPSULE, COATED, EXTENDED RELEASE ORAL DAILY
Status: DISCONTINUED | OUTPATIENT
Start: 2021-02-19 | End: 2021-02-23 | Stop reason: HOSPADM

## 2021-02-19 RX ORDER — PROMETHAZINE HYDROCHLORIDE 25 MG/ML
25 INJECTION, SOLUTION INTRAMUSCULAR; INTRAVENOUS ONCE
Status: COMPLETED | OUTPATIENT
Start: 2021-02-19 | End: 2021-02-19

## 2021-02-19 RX ORDER — ASPIRIN 81 MG/1
324 TABLET, CHEWABLE ORAL ONCE
Status: COMPLETED | OUTPATIENT
Start: 2021-02-19 | End: 2021-02-19

## 2021-02-19 RX ORDER — DEXTROSE MONOHYDRATE 50 MG/ML
100 INJECTION, SOLUTION INTRAVENOUS PRN
Status: DISCONTINUED | OUTPATIENT
Start: 2021-02-19 | End: 2021-02-23 | Stop reason: HOSPADM

## 2021-02-19 RX ORDER — ATORVASTATIN CALCIUM 80 MG/1
80 TABLET, FILM COATED ORAL NIGHTLY
Status: DISCONTINUED | OUTPATIENT
Start: 2021-02-19 | End: 2021-02-23 | Stop reason: HOSPADM

## 2021-02-19 RX ORDER — PROMETHAZINE HYDROCHLORIDE 25 MG/1
12.5 TABLET ORAL EVERY 6 HOURS PRN
Status: DISCONTINUED | OUTPATIENT
Start: 2021-02-19 | End: 2021-02-23 | Stop reason: HOSPADM

## 2021-02-19 RX ORDER — SODIUM CHLORIDE 9 MG/ML
INJECTION, SOLUTION INTRAVENOUS CONTINUOUS
Status: DISCONTINUED | OUTPATIENT
Start: 2021-02-19 | End: 2021-02-20

## 2021-02-19 RX ORDER — ONDANSETRON 2 MG/ML
4 INJECTION INTRAMUSCULAR; INTRAVENOUS EVERY 6 HOURS PRN
Status: DISCONTINUED | OUTPATIENT
Start: 2021-02-19 | End: 2021-02-23 | Stop reason: HOSPADM

## 2021-02-19 RX ORDER — MECLIZINE HYDROCHLORIDE 25 MG/1
25 TABLET ORAL ONCE
Status: COMPLETED | OUTPATIENT
Start: 2021-02-19 | End: 2021-02-19

## 2021-02-19 RX ORDER — DEXTROSE MONOHYDRATE 25 G/50ML
12.5 INJECTION, SOLUTION INTRAVENOUS PRN
Status: DISCONTINUED | OUTPATIENT
Start: 2021-02-19 | End: 2021-02-23 | Stop reason: HOSPADM

## 2021-02-19 RX ORDER — LABETALOL HYDROCHLORIDE 5 MG/ML
10 INJECTION, SOLUTION INTRAVENOUS EVERY 10 MIN PRN
Status: DISCONTINUED | OUTPATIENT
Start: 2021-02-19 | End: 2021-02-23 | Stop reason: HOSPADM

## 2021-02-19 RX ORDER — SODIUM CHLORIDE 0.9 % (FLUSH) 0.9 %
10 SYRINGE (ML) INJECTION EVERY 12 HOURS SCHEDULED
Status: DISCONTINUED | OUTPATIENT
Start: 2021-02-19 | End: 2021-02-23 | Stop reason: HOSPADM

## 2021-02-19 RX ORDER — ONDANSETRON 2 MG/ML
4 INJECTION INTRAMUSCULAR; INTRAVENOUS ONCE
Status: COMPLETED | OUTPATIENT
Start: 2021-02-19 | End: 2021-02-19

## 2021-02-19 RX ORDER — SODIUM CHLORIDE 0.9 % (FLUSH) 0.9 %
10 SYRINGE (ML) INJECTION PRN
Status: DISCONTINUED | OUTPATIENT
Start: 2021-02-19 | End: 2021-02-23 | Stop reason: HOSPADM

## 2021-02-19 RX ORDER — NICOTINE POLACRILEX 4 MG
15 LOZENGE BUCCAL PRN
Status: DISCONTINUED | OUTPATIENT
Start: 2021-02-19 | End: 2021-02-23 | Stop reason: HOSPADM

## 2021-02-19 RX ADMIN — ASPIRIN 324 MG: 81 TABLET, CHEWABLE ORAL at 15:45

## 2021-02-19 RX ADMIN — SODIUM CHLORIDE: 9 INJECTION, SOLUTION INTRAVENOUS at 18:37

## 2021-02-19 RX ADMIN — ENOXAPARIN SODIUM 40 MG: 40 INJECTION SUBCUTANEOUS at 18:37

## 2021-02-19 RX ADMIN — IOPAMIDOL 80 ML: 755 INJECTION, SOLUTION INTRAVENOUS at 15:18

## 2021-02-19 RX ADMIN — SODIUM CHLORIDE 500 ML: 9 INJECTION, SOLUTION INTRAVENOUS at 15:45

## 2021-02-19 RX ADMIN — MECLIZINE HYDROCHLORIDE 25 MG: 25 TABLET ORAL at 15:45

## 2021-02-19 RX ADMIN — ONDANSETRON 4 MG: 2 INJECTION INTRAMUSCULAR; INTRAVENOUS at 15:28

## 2021-02-19 RX ADMIN — PROMETHAZINE HYDROCHLORIDE 25 MG: 25 INJECTION INTRAMUSCULAR; INTRAVENOUS at 18:36

## 2021-02-19 ASSESSMENT — PAIN SCALES - GENERAL
PAINLEVEL_OUTOF10: 0
PAINLEVEL_OUTOF10: 0

## 2021-02-19 NOTE — PROGRESS NOTES
Patient brought to unit via wheelchair by this nurse. Pt able to transfer from wheelchair to bed with standby assistance. Pt vomited profusely shortly after the transfer due to his dizziness with movement. PS Sent to [facility], IM Phenergan 25mg ordered and administered. Pt unable to complete swallow screen at this time and he was educated on the need for this before PO fluids can be given. No other complaints from patient at this time. No skin issues, IV fluids started. Patient provided urinal and bed alarm on.

## 2021-02-19 NOTE — ED NOTES
1622 paged hospitalist     Carol Valadez  02/19/21 1622  1638 Marek Almazan mid level with [employer] returned call      Carol Valadez  02/19/21 1634

## 2021-02-19 NOTE — PROGRESS NOTES
Medication History  [facility]    Patient Name: Thanh Monae 1950     Medication history has been completed by: Allegra Valentine CPhT    Source(s) of information: patient, insurance claims and retail pharmacy     Primary Care Physician: Rivera Jeff MD     Pharmacy: [facility]    Allergies as of 02/19/2021    (No Known Allergies)        Prior to Admission medications    Medication Sig Start Date End Date Taking? Authorizing Provider   dilTIAZem (CARDIZEM CD) 120 MG extended release capsule Take 1 capsule by mouth daily 2/12/21  Yes Solitario Cross MD   ibuprofen (ADVIL;MOTRIN) 800 MG tablet Take 1 tablet by mouth every 6 hours as needed for Pain 1/21/21  Yes Gregory Morales PA-C   Dulaglutide (TRULICITY) 5.78 OM/1.2OG SOPN inject 0.5 milliliters ( 0.75 milligrams ) subcutaneously every 7 days  Patient taking differently: 0.75 mg once a week Takes on Sundays 1/4/21  Yes Rivera Jeff MD   metFORMIN (GLUCOPHAGE-XR) 500 MG extended release tablet take 2 tablets by mouth once daily 10/1/20  Yes Rivera Jeff MD   atorvastatin (LIPITOR) 20 MG tablet take 1 tablet by mouth once daily 10/1/20  Yes Rivera Jeff MD   lisinopril (PRINIVIL;ZESTRIL) 10 MG tablet take 1 tablet by mouth once daily 10/1/20  Yes Rivera Jeff MD   polyethylene glycol [facility]) 17 GM/SCOOP powder Take 17 g by mouth daily 2/15/21 6/15/21  SONNY Cain CNP   docusate sodium (COLACE) 100 MG capsule Take 1 capsule by mouth 2 times daily 2/15/21 3/17/21  SONNY Cain CNP   Blood Glucose Monitoring Suppl (FREESTYLE LITE) MELODY 1 Device by Does not apply route daily 1/4/21   Rivera Jeff MD   blood glucose test strips (FREESTYLE LITE) strip 1 each by In Vitro route daily As needed.  1/4/21   Rivera Jeff MD   [facility] VERIO strip TEST TWICE DAILY AND AS NEEDED 12/28/20   Geoffrey Lesser, MD Marci Gosselin LANCETS FINE MISC by Does not apply route Historical Provider, MD   Glucose Blood (ONETOUCH VERIO VI) by In Vitro route    Historical Provider, MD     Comments:  Medication list reviewed with patient and insurance claims verified with retail pharmacist.  Patient states he usually takes his Trulicity on Sundays but did not take it this week d/t not feeling well. Patient reports he took his BP meds and his atorvastatin today.     To my knowledge the above medication history is accurate as of 2/19/2021 3:48 PM.   Reyes Coil, CPhT   2/19/2021 3:48 PM

## 2021-02-19 NOTE — ED PROVIDER NOTES
EMERGENCY DEPARTMENT ENCOUNTER      PCP: Rivera Jeff MD    CHIEF COMPLAINT    Chief Complaint   Patient presents with    Dizziness     Patient staffed with supervising physician Dr. Ledezma Blair is a 72 y.o. male who presents with dizziness with onset approximately 30 minutes ago. Patient describes this as room spinning. Patient has had associated nausea and vomiting. Patient denies any headache, chest pain or shortness of breath. Patient denies abdominal pain. Patient states he feels weak in his lower legs. Patient denies any numbness. Patient denies similar symptoms in the past.  Patient states symptoms do seem to worsen with movement. Patient denies any obvious vision changes. REVIEW OF SYSTEMS   Constitutional:  Denies fever  Neurologic:  See HPI. Eyes:   Denies discharge, dipplopia, blurred vision, or loss visual field  HENT:  Denies sore throat or ear pain   Cardiovascular:  Denies chest pain, palpitations. Respiratory:  Denies cough, shortness of breath, respiratory discomfort   GI  See HPI Denies abdominal pain. :  Denies Dysuria or Hematuria. Musculoskeletal:  Denies back pain. Skin:  Denies rash   Lymphatic:  Denies swollen glands     All other review of systems negative at this time  See HPI and nursing notes for additional information      PAST MEDICAL & SURGICAL HISTORY    Past Medical History:   Diagnosis Date    H/O echocardiogram 10/06/2020    EF55-60%, Mild TR & LVH.  History of exercise stress test 10/06/2020    Treadmill, Normal    Hyperlipidemia     Hypertension     PSVT (paroxysmal supraventricular tachycardia) ([facility] [address] 9/24/2020 9/2/2020 ECG [facility]    Type 2 diabetes mellitus ([facility] [address]      History reviewed. No pertinent surgical history.     CURRENT MEDICATIONS    Current Outpatient Rx   Medication Sig Dispense Refill    dilTIAZem (CARDIZEM CD) 120 MG extended release capsule Take 1 capsule by mouth daily 30 capsule 1    ibuprofen (ADVIL;MOTRIN) 800 MG tablet Take 1 tablet by mouth every 6 hours as needed for Pain 120 tablet 0    Dulaglutide (TRULICITY) 6.26 WK/1.3IR SOPN inject 0.5 milliliters ( 0.75 milligrams ) subcutaneously every 7 days (Patient taking differently: 0.75 mg once a week Takes on Sundays) 5 pen 1    metFORMIN (GLUCOPHAGE-XR) 500 MG extended release tablet take 2 tablets by mouth once daily 180 tablet 1    atorvastatin (LIPITOR) 20 MG tablet take 1 tablet by mouth once daily 90 tablet 1    lisinopril (PRINIVIL;ZESTRIL) 10 MG tablet take 1 tablet by mouth once daily 90 tablet 1    polyethylene glycol (MIRALAX) 17 GM/SCOOP powder Take 17 g by mouth daily 510 g 3    docusate sodium (COLACE) 100 MG capsule Take 1 capsule by mouth 2 times daily 60 capsule 2    Blood Glucose Monitoring Suppl (FREESTYLE LITE) MELODY 1 Device by Does not apply route daily 1 Device 0    blood glucose test strips (FREESTYLE LITE) strip 1 each by In Vitro route daily As needed.  100 each 3    ONETOUCH VERIO strip TEST TWICE DAILY AND AS NEEDED 100 strip 5    ONETOUCH DELICA LANCETS FINE MISC by Does not apply route      Glucose Blood (ONETOUCH VERIO VI) by In Vitro route         ALLERGIES    No Known Allergies    SOCIAL & FAMILY HISTORY    Social History     Socioeconomic History    Marital status: Single     Spouse name: None    Number of children: None    Years of education: None    Highest education level: None   Occupational History    None   Social Needs    Financial resource strain: None    Food insecurity     Worry: None     Inability: None    Transportation needs     Medical: None     Non-medical: None   Tobacco Use    Smoking status: Never Smoker    Smokeless tobacco: Never Used   Substance and Sexual Activity    Alcohol use: Not Currently     Comment: 2-3 beers per year/caffeine 2 cups of coffee a week and 4 pops a week    Drug use: No    Sexual activity: None   Lifestyle    Physical activity     Days per week: None     Minutes per session: None    Stress: None   Relationships    Social connections     Talks on phone: None     Gets together: None     Attends Yazidism service: None     Active member of club or organization: None     Attends meetings of clubs or organizations: None     Relationship status: None    Intimate partner violence     Fear of current or ex partner: None     Emotionally abused: None     Physically abused: None     Forced sexual activity: None   Other Topics Concern    None   Social History Narrative    None     Family History   Problem Relation Age of Onset    Diabetes Mother     High Cholesterol Mother     Heart Disease Mother     Cancer Father     Diabetes Sister     High Blood Pressure Sister     High Cholesterol Sister        PHYSICAL EXAM    VITAL SIGNS: BP (!) 146/81   Pulse 74   Temp 97.7 °F (36.5 °C) (Oral)   Resp 16   Ht 6' 3\" (1.905 m)   Wt 208 lb (94.3 kg)   SpO2 97%   BMI 26.00 kg/m²    Constitutional:  Well developed, well nourished, no acute distress   HENT:  Atraumatic. Eyes: Conjunctiva clear. No tearing . Pupils equally round and react to light, extraocular movement are intact. Horizontal nystagmus  Neck: supple, no JVD. Cardiovascular:  Reg rate & rhythm, no murmurs/rubs/gallops. Respiratory:  Lungs Clear, no retractions   GI:  Soft, nontender, normal bowel sounds  Musculoskeletal:  No edema, no deformities  Integument:  Well hydrated, no petechiae   Neurologic:    - Alert & oriented person, place, time, and situation, no speech difficulties or slurring.  - No obvious gross motor deficits  - Cranial nerves 2-12 grossly intact  - Sensation intact to light touch  - Strength 5/5 in upper and lower extremities bilaterally  - No meningeal signs  - Normal finger to nose test bilaterally  - Rapid alternating movements intact  - No pronator drift. - Light touch sensation intact throughout.   - Upper and lower extremity DTRs 2+ bilaterally.  -Unable to assess gait during initial evaluation due to patient actively vomiting due to dizziness  Psych: Pleasant affect, no hallucinations      LABS:   Results for orders placed or performed during the hospital encounter of 02/19/21   CBC Auto Differential   Result Value Ref Range    WBC 8.2 4.0 - 10.5 K/CU MM    RBC 5.65 4.6 - 6.2 M/CU MM    Hemoglobin 14.6 13.5 - 18.0 GM/DL    Hematocrit 47.1 42 - 52 %    MCV 83.4 78 - 100 FL    MCH 25.8 (L) 27 - 31 PG    MCHC 31.0 (L) 32.0 - 36.0 %    RDW 13.3 11.7 - 14.9 %    Platelets 760 111 - 586 K/CU MM    MPV 9.2 7.5 - 11.1 FL    Differential Type AUTOMATED DIFFERENTIAL     Segs Relative 31.9 (L) 36 - 66 %    Lymphocytes % 54.7 (H) 24 - 44 %    Monocytes % 9.7 (H) 0 - 4 %    Eosinophils % 2.3 0 - 3 %    Basophils % 0.5 0 - 1 %    Segs Absolute 2.6 K/CU MM    Lymphocytes Absolute 4.5 K/CU MM    Monocytes Absolute 0.8 K/CU MM    Eosinophils Absolute 0.2 K/CU MM    Basophils Absolute 0.0 K/CU MM    Nucleated RBC % 0.0 %    Total Nucleated RBC 0.0 K/CU MM    Total Immature Neutrophil 0.07 K/CU MM    Immature Neutrophil % 0.9 (H) 0 - 0.43 %   Comprehensive Metabolic Panel w/ Reflex to MG   Result Value Ref Range    Sodium 134 (L) 135 - 145 MMOL/L    Potassium 3.2 (L) 3.5 - 5.1 MMOL/L    Chloride 100 99 - 110 mMol/L    CO2 24 21 - 32 MMOL/L    BUN 14 6 - 23 MG/DL    CREATININE 1.1 0.9 - 1.3 MG/DL    Glucose 231 (H) 70 - 99 MG/DL    Calcium 8.4 8.3 - 10.6 MG/DL    Albumin 3.8 3.4 - 5.0 GM/DL    Total Protein 6.9 6.4 - 8.2 GM/DL    Total Bilirubin 0.5 0.0 - 1.0 MG/DL    ALT 26 10 - 40 U/L    AST 22 15 - 37 IU/L    Alkaline Phosphatase 72 40 - 129 IU/L    GFR Non-African American >60 >60 mL/min/1.73m2    GFR African American >60 >60 mL/min/1.73m2    Anion Gap 10 4 - 16   Troponin   Result Value Ref Range    Troponin T <0.010 <0.01 NG/ML   Protime-INR   Result Value Ref Range    Protime 10.7 (L) 11.7 - 14.5 SECONDS    INR 0.89 INDEX   Lipase   Result Value Ref Range    Lipase 61 (H) 13 - 60 IU/L occlusion and to give patient aspirin. CTA head neck with contrast shows no flow-limiting stenosis or large vessel occlusion. I discussed labs and imaging with patient. Patient having continued dizziness on reevaluation. Consult hospitalist who accepts admission. Clinical  IMPRESSION    1. Dizziness    2. Nausea and vomiting, intractability of vomiting not specified, unspecified vomiting type        Patient admitted    Comment: Please note this report has been produced using speech recognition software and may contain errors related to that system including errors in grammar, punctuation, and spelling, as well as words and phrases that may be inappropriate. If there are any questions or concerns please feel free to contact the dictating provider for clarification.             Deepali Huff PA-C  02/19/21 [id number]

## 2021-02-19 NOTE — H&P
History and Physical      Name:  Johnathan Coker /Age/Sex: 1950  (89 y.o. male)   MRN & CSN:  [id number] & [id number] Admission Date/Time: 2021  2:38 PM   Location:  [place] [address] PCP: Eros Alvarado MD       Hospital Day: 1        Admitting Physician: Dr. Glenna Helms and Plan:   Johnathan Coker is a 72 y.o. male who presents with  Dizziness     Stroke-like symptoms-presents with dizziness. Stroke alert activated. LKW ~30 min PTA. Non candidate for TPA. See ED provider note for timeline. Admit luis carlos Med/Surg   Neuro checks/ NIHSS   Telemetry    MRI pending   Echo pending   Carotids Pending    Neurology consulted    ASA/Statin on medication regimen   Swallow evaluation    PT/OT     Essential hypertension- Monitor BP trends. 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 Monitor FSBS and cover with medium dose SSI   Hold PO medications while inpatient           Patient case discussed with ED provider    Diet Diet NPO Effective Now   DVT Prophylaxis [x] Lovenox, []  Heparin, [] SCDs, [] Ambulation  [] Long term AC   GI Prophylaxis [] PPI,  [x] H2 Blocker,  [] Carafate,  [] Diet/Tube Feeds   Code Status Full     Disposition Admit to obs. Patient plans to return home upon discharge   MDM [] Low, [x] Moderate,[]  High     -Patient assessment and plan discussed and reviewed with admitting physician: Pasha Ritter MD.       History of Present Illness:     Chief Complaint: Kolton Coker is a 72 y.o. male who presents with acute onset of dizziness and bilateral lower extremity weakness. Reports onset/last known well ~1400. Describes spinning sensation and weakness. States imbalance gait instability. Associated N/V precipitated by symptoms     He has a recent evaluation at [facility] 2020 with for similar presentation but less severe.   He was treated for episode of SVT    Ten point ROS: reviewed negative, unless as noted in above HPI. Objective:   No intake or output data in the 24 hours ending 02/19/21 1637     Vitals:   Vitals:    02/19/21 1511 02/19/21 1513 02/19/21 1532 02/19/21 1601   BP: (!) 146/83 (!) 146/83 (!) 158/77 (!) 146/81   Pulse: 77 74 74 74   Resp: 14 16 20 16   Temp:  97.7 °F (36.5 °C)     TempSrc:  Oral     SpO2: 99% 98% 99% 97%   Weight:  208 lb (94.3 kg)     Height:  6' 3\" (1.905 m)         Physical Exam: 02/19/21     GEN -Awake nontoxic appearing male, sitting upright in bed , NAD. normal body habitus. Appears given age. EYES -PERRLA. No scleral erythema, discharge, or conjunctivitis. HENT -MM are moist. Oral pharynx without exudates, no evidence of thrush. NECK -Supple, no apparent thyromegaly or masses. RESP -CTA, no wheezes, rales or rhonchi. Symmetric chest movement while on RA.   C/V -S1/S2 auscultated. RRR without appreciable M/R/G. No JVD or carotid bruits. Peripheral pulses equal bilaterally and palpable. Cap refill <3 sec. No peripheral edema. GI -Abdomen is soft non distended and without significant TTP. + BS. No masses or guarding. Rectal exam deferred. No HSM   -No CVA/ flank tenderness. Rios catheter is not present. LYMPH-No palpable cervical lymphadenopathy and no hepatosplenomegaly. No petechiae or ecchymoses. MS -No gross joint deformities. SKIN -Normal coloration, warm, dry. NEURO-Cranial nerves appear grossly intact, normal speech, no lateralizing weakness. PSYC-Awake, alert, oriented x 4- person, place, time, situation,  Appropriate affect. Past Medical History:      Past Medical History:   Diagnosis Date    H/O echocardiogram 10/06/2020    EF55-60%, Mild TR & LVH.     History of exercise stress test 10/06/2020    Treadmill, Normal    Hyperlipidemia     Hypertension     PSVT (paroxysmal supraventricular tachycardia) ([address] 9/24/2020 9/2/2020 ECG at ER at [facility]    Type 2 diabetes mellitus ([facility] [address]        Past daily 2/12/21  Yes Angel Terry MD   ibuprofen (ADVIL;MOTRIN) 800 MG tablet Take 1 tablet by mouth every 6 hours as needed for Pain 1/21/21  Yes Star Louise PA-C   Dulaglutide (TRULICITY) 4.75 GO/9.4FH SOPN inject 0.5 milliliters ( 0.75 milligrams ) subcutaneously every 7 days  Patient taking differently: 0.75 mg once a week Takes on Sundays 1/4/21  Yes Palak Schrader MD   metFORMIN (GLUCOPHAGE-XR) 500 MG extended release tablet take 2 tablets by mouth once daily 10/1/20  Yes Palak Schrader MD   atorvastatin (LIPITOR) 20 MG tablet take 1 tablet by mouth once daily 10/1/20  Yes Palak Schrader MD   lisinopril (PRINIVIL;ZESTRIL) 10 MG tablet take 1 tablet by mouth once daily 10/1/20  Yes Palak Schrader MD   polyethylene glycol [facility]) 17 GM/SCOOP powder Take 17 g by mouth daily 2/15/21 6/15/21  SONNY Calvo CNP   docusate sodium (COLACE) 100 MG capsule Take 1 capsule by mouth 2 times daily 2/15/21 3/17/21  SONNY Calvo CNP   Blood Glucose Monitoring Suppl (FREESTYLE LITE) MELODY 1 Device by Does not apply route daily 1/4/21   Palak Schrader MD   blood glucose test strips (FREESTYLE LITE) strip 1 each by In Vitro route daily As needed.  1/4/21   Palak Schrader MD   [facility] VERIO strip TEST TWICE DAILY AND AS NEEDED 12/28/20   MD Fiordaliza Boothe LANCETS FINE MISC by Does not apply route    Historical Provider, MD   Glucose Blood (ONETOUCH VERIO VI) by In Vitro route    Historical Provider, MD         Medications:   Medications:    sodium chloride  500 mL Intravenous Once      Infusions:   PRN Meds:      Data:     Laboratory this visit:  Reviewed  Recent Labs     02/19/21  1450   WBC 8.2   HGB 14.6   HCT 47.1         Recent Labs     02/19/21  1450   *   K 3.2*      CO2 24   BUN 14   CREATININE 1.1     Recent Labs     02/19/21  1450   AST 22   ALT 26   BILITOT 0.5   ALKPHOS 72     Recent Labs     02/19/21  1450   INR 0.89         Radiology this visit:  Reviewed. Ct Head Wo Contrast    Result Date: 2/19/2021  EXAMINATION: CT OF THE HEAD WITHOUT CONTRAST  2/19/2021 3:00 pm TECHNIQUE: CT of the head was performed without the administration of intravenous contrast. Dose modulation, iterative reconstruction, and/or weight based adjustment of the mA/kV was utilized to reduce the radiation dose to as low as reasonably achievable. COMPARISON: None. HISTORY: ORDERING SYSTEM PROVIDED HISTORY: dizziness TECHNOLOGIST PROVIDED HISTORY: Has a \"code stroke\" or \"stroke alert\" been called? ->Yes Reason for exam:->dizziness Reason for Exam: AMS/ dizziness/ nausea Acuity: Acute Type of Exam: Initial FINDINGS: BRAIN/VENTRICLES: There is no acute intracranial hemorrhage, mass effect or midline shift. No abnormal extra-axial fluid collection. The gray-white differentiation is maintained without evidence of an acute infarct. There is no evidence of hydrocephalus. ORBITS: The visualized portion of the orbits demonstrate no acute abnormality. SINUSES: The visualized paranasal sinuses and mastoid air cells demonstrate no acute abnormality. SOFT TISSUES/SKULL:  No acute abnormality of the visualized skull or soft tissues. No acute intracranial abnormality. Critical results were called by Dr. Jose M Rice. Sameer Reyes MD to [address] [facility] [place] department on 2/19/2021 at 15:08. Xr Chest Portable    Result Date: 2/19/2021  EXAMINATION: ONE XRAY VIEW OF THE CHEST 2/19/2021 3:14 pm COMPARISON: None. HISTORY: ORDERING SYSTEM PROVIDED HISTORY: dizziness TECHNOLOGIST PROVIDED HISTORY: Reason for exam:->dizziness Reason for Exam: dizziness Acuity: Acute Type of Exam: Initial Additional signs and symptoms: unknown Relevant Medical/Surgical History: none FINDINGS: The lungs are without acute focal process. There is no effusion or pneumothorax. The cardiomediastinal silhouette is without acute process.  The osseous structures are without acute process. No acute process. Cta Head Neck W Contrast    Result Date: 2/19/2021  EXAMINATION: CTA OF THE HEAD AND NECK WITH CONTRAST 2/19/2021 3:17 pm: TECHNIQUE: CTA of the head and neck was performed with the administration of intravenous contrast. Multiplanar reformatted images are provided for review. MIP images are provided for review. Stenosis of the internal carotid arteries measured using NASCET criteria. Dose modulation, iterative reconstruction, and/or weight based adjustment of the mA/kV was utilized to reduce the radiation dose to as low as reasonably achievable. COMPARISON: None. HISTORY: ORDERING SYSTEM PROVIDED HISTORY: stroke alert dizziness TECHNOLOGIST PROVIDED HISTORY: Reason for exam:->stroke alert dizziness Decision Support Exception->Emergency Medical Condition (MA) Reason for Exam: stroke alert, dizziness/nausea Acuity: Acute Type of Exam: Initial Additional signs and symptoms: 80ml Isovue 370 Relevant Medical/Surgical History: hx HTN FINDINGS: CTA NECK: AORTIC ARCH/ARCH VESSELS: No dissection or arterial injury. No significant stenosis of the brachiocephalic or subclavian arteries. CAROTID ARTERIES: No dissection, arterial injury, or hemodynamically significant stenosis by NASCET criteria. VERTEBRAL ARTERIES: No dissection, arterial injury, or significant stenosis. SOFT TISSUES: The lung apices are clear. No cervical or superior mediastinal lymphadenopathy. The larynx and pharynx are unremarkable. No acute abnormality of the salivary and thyroid glands. There is mucosal thickening of the paranasal sinuses with near complete opacification of the right maxillary antrum. BONES: There are degenerative changes of the spine and numerous dental caries are present. CTA HEAD: ANTERIOR CIRCULATION: No significant stenosis of the intracranial internal carotid, anterior cerebral, or middle cerebral arteries. No aneurysm.  POSTERIOR CIRCULATION: No significant stenosis of the vertebral, basilar, or posterior cerebral arteries. No aneurysm. OTHER: No dural venous sinus thrombosis on this non-dedicated study. BRAIN: See separately dictated noncontrast head CT report. No flow limiting stenosis or large vessel occlusion visualized within the head or neck. Paranasal sinus inflammatory mucosal disease with near complete opacification of the right maxillary sinus. Severe dental disease.          EKG this visit:  Reviewed         Electronically signed by SONNY Irvin CNP on 2/19/2021 at 4:37 PM

## 2021-02-20 ENCOUNTER — APPOINTMENT (OUTPATIENT)
Dept: MRI IMAGING | Age: 71
End: 2021-02-20
Payer: COMMERCIAL

## 2021-02-20 LAB
ALBUMIN SERPL-MCNC: 3.9 GM/DL (ref 3.4–5)
ALP BLD-CCNC: 65 IU/L (ref 40–128)
ALT SERPL-CCNC: 22 U/L (ref 10–40)
ANION GAP SERPL CALCULATED.3IONS-SCNC: 9 MMOL/L (ref 4–16)
AST SERPL-CCNC: 26 IU/L (ref 15–37)
BILIRUB SERPL-MCNC: 0.7 MG/DL (ref 0–1)
BUN BLDV-MCNC: 11 MG/DL (ref 6–23)
CALCIUM SERPL-MCNC: 8.5 MG/DL (ref 8.3–10.6)
CHLORIDE BLD-SCNC: 105 MMOL/L (ref 99–110)
CHOLESTEROL: 114 MG/DL
CO2: 25 MMOL/L (ref 21–32)
CREAT SERPL-MCNC: 1 MG/DL (ref 0.9–1.3)
ESTIMATED AVERAGE GLUCOSE: 157 MG/DL
GFR AFRICAN AMERICAN: >60 ML/MIN/1.73M2
GFR NON-AFRICAN AMERICAN: >60 ML/MIN/1.73M2
GLUCOSE BLD-MCNC: 102 MG/DL (ref 70–99)
GLUCOSE BLD-MCNC: 114 MG/DL (ref 70–99)
GLUCOSE BLD-MCNC: 134 MG/DL (ref 70–99)
GLUCOSE BLD-MCNC: 152 MG/DL (ref 70–99)
GLUCOSE BLD-MCNC: 91 MG/DL (ref 70–99)
HBA1C MFR BLD: 7.1 % (ref 4.2–6.3)
HCT VFR BLD CALC: 44.8 % (ref 42–52)
HDLC SERPL-MCNC: 44 MG/DL
HEMOGLOBIN: 13.9 GM/DL (ref 13.5–18)
LDL CHOLESTEROL DIRECT: 62 MG/DL
MCH RBC QN AUTO: 26.5 PG (ref 27–31)
MCHC RBC AUTO-ENTMCNC: 31 % (ref 32–36)
MCV RBC AUTO: 85.3 FL (ref 78–100)
PDW BLD-RTO: 13.3 % (ref 11.7–14.9)
PLATELET # BLD: 302 K/CU MM (ref 140–440)
PMV BLD AUTO: 9.3 FL (ref 7.5–11.1)
POTASSIUM SERPL-SCNC: 4.2 MMOL/L (ref 3.5–5.1)
RBC # BLD: 5.25 M/CU MM (ref 4.6–6.2)
SODIUM BLD-SCNC: 139 MMOL/L (ref 135–145)
TOTAL PROTEIN: 6.1 GM/DL (ref 6.4–8.2)
TRIGL SERPL-MCNC: 65 MG/DL
WBC # BLD: 8.9 K/CU MM (ref 4–10.5)

## 2021-02-20 PROCEDURE — 6370000000 HC RX 637 (ALT 250 FOR IP): Performed by: PSYCHIATRY & NEUROLOGY

## 2021-02-20 PROCEDURE — 6370000000 HC RX 637 (ALT 250 FOR IP): Performed by: NURSE PRACTITIONER

## 2021-02-20 PROCEDURE — 70551 MRI BRAIN STEM W/O DYE: CPT

## 2021-02-20 PROCEDURE — 84484 ASSAY OF TROPONIN QUANT: CPT

## 2021-02-20 PROCEDURE — G0378 HOSPITAL OBSERVATION PER HR: HCPCS

## 2021-02-20 PROCEDURE — 6370000000 HC RX 637 (ALT 250 FOR IP): Performed by: HOSPITALIST

## 2021-02-20 PROCEDURE — 80053 COMPREHEN METABOLIC PANEL: CPT

## 2021-02-20 PROCEDURE — 83036 HEMOGLOBIN GLYCOSYLATED A1C: CPT

## 2021-02-20 PROCEDURE — 80061 LIPID PANEL: CPT

## 2021-02-20 PROCEDURE — 96372 THER/PROPH/DIAG INJ SC/IM: CPT

## 2021-02-20 PROCEDURE — 82962 GLUCOSE BLOOD TEST: CPT

## 2021-02-20 PROCEDURE — 36415 COLL VENOUS BLD VENIPUNCTURE: CPT

## 2021-02-20 PROCEDURE — 83721 ASSAY OF BLOOD LIPOPROTEIN: CPT

## 2021-02-20 PROCEDURE — 6360000002 HC RX W HCPCS: Performed by: NURSE PRACTITIONER

## 2021-02-20 PROCEDURE — 94761 N-INVAS EAR/PLS OXIMETRY MLT: CPT

## 2021-02-20 PROCEDURE — 2580000003 HC RX 258: Performed by: NURSE PRACTITIONER

## 2021-02-20 PROCEDURE — 85027 COMPLETE CBC AUTOMATED: CPT

## 2021-02-20 RX ORDER — SODIUM CHLORIDE 9 MG/ML
INJECTION, SOLUTION INTRAVENOUS CONTINUOUS
Status: ACTIVE | OUTPATIENT
Start: 2021-02-20 | End: 2021-02-20

## 2021-02-20 RX ORDER — MECLIZINE HYDROCHLORIDE 25 MG/1
25 TABLET ORAL 3 TIMES DAILY PRN
Status: DISCONTINUED | OUTPATIENT
Start: 2021-02-20 | End: 2021-02-23 | Stop reason: HOSPADM

## 2021-02-20 RX ORDER — CLOPIDOGREL BISULFATE 75 MG/1
75 TABLET ORAL DAILY
Status: DISCONTINUED | OUTPATIENT
Start: 2021-02-20 | End: 2021-02-23 | Stop reason: HOSPADM

## 2021-02-20 RX ADMIN — CLOPIDOGREL BISULFATE 75 MG: 75 TABLET ORAL at 21:43

## 2021-02-20 RX ADMIN — INSULIN LISPRO 2 UNITS: 100 INJECTION, SOLUTION INTRAVENOUS; SUBCUTANEOUS at 15:53

## 2021-02-20 RX ADMIN — ASPIRIN 81 MG: 81 TABLET, FILM COATED ORAL at 07:53

## 2021-02-20 RX ADMIN — ATORVASTATIN CALCIUM 80 MG: 80 TABLET, FILM COATED ORAL at 21:43

## 2021-02-20 RX ADMIN — MECLIZINE HYDROCHLORIDE 25 MG: 25 TABLET ORAL at 21:43

## 2021-02-20 RX ADMIN — SODIUM CHLORIDE, PRESERVATIVE FREE 10 ML: 5 INJECTION INTRAVENOUS at 21:45

## 2021-02-20 RX ADMIN — DILTIAZEM HYDROCHLORIDE 120 MG: 120 CAPSULE, COATED, EXTENDED RELEASE ORAL at 07:54

## 2021-02-20 RX ADMIN — SODIUM CHLORIDE: 9 INJECTION, SOLUTION INTRAVENOUS at 08:02

## 2021-02-20 RX ADMIN — ENOXAPARIN SODIUM 40 MG: 40 INJECTION SUBCUTANEOUS at 08:02

## 2021-02-20 ASSESSMENT — PAIN SCALES - GENERAL
PAINLEVEL_OUTOF10: 0

## 2021-02-20 NOTE — FLOWSHEET NOTE
02/20/21 1043   Vital Signs   Orthostatic B/P and Pulse?  Yes   Blood Pressure Lying 136/76   Pulse Lying 76 PER MINUTE   Blood Pressure Sitting 152/79   Pulse Sitting 98 PER MINUTE   Blood Pressure Standing 145/79   Pulse Standing 97 PER MINUTE

## 2021-02-20 NOTE — PROGRESS NOTES
Daily  atorvastatin (LIPITOR) tablet 80 mg, 80 mg, Oral, Nightly  labetalol (NORMODYNE;TRANDATE) injection 10 mg, 10 mg, Intravenous, Q10 Min PRN  insulin lispro (HUMALOG) injection vial 0-12 Units, 0-12 Units, Subcutaneous, TID WC  insulin lispro (HUMALOG) injection vial 0-6 Units, 0-6 Units, Subcutaneous, Nightly  glucose (GLUTOSE) 40 % oral gel 15 g, 15 g, Oral, PRN  dextrose 50 % IV solution, 12.5 g, Intravenous, PRN  glucagon (rDNA) injection 1 mg, 1 mg, Intramuscular, PRN  dextrose 5 % solution, 100 mL/hr, Intravenous, PRN  dilTIAZem (CARDIZEM CD) extended release capsule 120 mg, 120 mg, Oral, Daily  Allergies:  Patient has no known allergies. Social History:  TOBACCO:   reports that he has never smoked. He has never used smokeless tobacco.  ETOH:   reports previous alcohol use. DRUGS:   reports no history of drug use. Family History:       Problem Relation Age of Onset    Diabetes Mother     High Cholesterol Mother     Heart Disease Mother     Cancer Father     Diabetes Sister     High Blood Pressure Sister     High Cholesterol Sister        REVIEW OF SYSTEMS:  CONSTITUTIONAL:  negative  HEENT:  negative  RESPIRATORY:  negative  CARDIOVASCULAR:  negative  GASTROINTESTINAL:  negative  GENITOURINARY:  negative  MUSCULOSKELETAL:  negative  BEHAVIOR/PSYCH:  Negative    ROS neg    Family hx neg    PHYSICAL EXAM  ------------------------  Vitals:  /72   Pulse 70   Temp 98 °F (36.7 °C) (Oral)   Resp 23   Ht 6' 3\" (1.905 m)   Wt 200 lb 4.8 oz (90.9 kg)   SpO2 97%   BMI 25.04 kg/m²      General:  Awake, alert, oriented X 3. Well developed, well nourished, well groomed. No apparent distress. HEENT:  Normocephalic, atraumatic. Pupils equal, round, reactive to light. No scleral icterus. No conjunctival injection. Normal lips, teeth, and gums. No nasal discharge.   Neck:  Supple  Heart:  RRR, no murmurs, gallops, rubs  Lungs:  CTA bilaterally, bilat symmetrical expansion, no wheeze, rales, or rhonchi  Abdomen: Bowel sounds present, soft, nontender, no masses, no organomegaly, no peritoneal signs  Extremities:  No clubbing, cyanosis, or edema  Skin:  Warm and dry, no open lesions or rash  Breast: deferred  Rectal: deferred  Genitalia:  deferred    NEUROLOGICAL EXAM  ---------------------------------    Mental Status Exam:             Alert and oriented times three,follows commands,speech and language intact    Cranial Rwbdwz-PA-DPE Intact.         Cranial nerve II           Visual acuity:  normal                 Cranial nerve III           Pupils:  equal, round, reactive to light      Cranial nerves III, IV, VI           Extraocular Movements: intact      Cranial nerve V           Facial sensation:  intact      Cranial nerve VII           Facial strength: intact      Cranial nerve VIII           Hearing:  intact      Cranial nerve IX           Palate:  intact      Cranial nerve XI         Shoulder shrug:  intact      Cranial nerve XII          Tongue movement:  normal    Motor:    Drift:  absent  Motor exam is symmetrical 5 out of 5 all extremities bilaterally  Tone:  normal  Abnormal Movements:  Absent    DTRs-2+ biceps,triceps,brachioradialis,knee jerks and ankle jerks bilaterally symmetrical.  Toes-downgoing bilaterally            Sensory: sensation -not tested            CBC with Differential:    Lab Results   Component Value Date    WBC 8.9 02/20/2021    RBC 5.25 02/20/2021    HGB 13.9 02/20/2021    HCT 44.8 02/20/2021     02/20/2021    MCV 85.3 02/20/2021    MCH 26.5 02/20/2021    MCHC 31.0 02/20/2021    RDW 13.3 02/20/2021    SEGSPCT 31.9 02/19/2021    LYMPHOPCT 54.7 02/19/2021    MONOPCT 9.7 02/19/2021    BASOPCT 0.5 02/19/2021    MONOSABS 0.8 02/19/2021    LYMPHSABS 4.5 02/19/2021    EOSABS 0.2 02/19/2021    BASOSABS 0.0 02/19/2021    DIFFTYPE AUTOMATED DIFFERENTIAL 02/19/2021     CMP:    Lab Results   Component Value Date     02/20/2021    K 4.2 02/20/2021     02/20/2021 CO2 25 02/20/2021    BUN 11 02/20/2021    CREATININE 1.0 02/20/2021    GFRAA >60 02/20/2021    AGRATIO 1.7 03/13/2020    LABGLOM >60 02/20/2021    GLUCOSE 91 02/20/2021    PROT 6.1 02/20/2021    LABALBU 3.9 02/20/2021    CALCIUM 8.5 02/20/2021    BILITOT 0.7 02/20/2021    ALKPHOS 65 02/20/2021    AST 26 02/20/2021    ALT 22 02/20/2021     BMP:    Lab Results   Component Value Date     02/20/2021    K 4.2 02/20/2021     02/20/2021    CO2 25 02/20/2021    BUN 11 02/20/2021    LABALBU 3.9 02/20/2021    CREATININE 1.0 02/20/2021    CALCIUM 8.5 02/20/2021    GFRAA >60 02/20/2021    LABGLOM >60 02/20/2021    GLUCOSE 91 02/20/2021     PT/INR:    Lab Results   Component Value Date    PROTIME 10.7 02/19/2021    INR 0.89 02/19/2021     PTT:  No results found for: APTT, PTT[APTT  U/A:  No results found for: NITRITE, COLORU, PHUR, LABCAST, WBCUA, RBCUA, MUCUS, TRICHOMONAS, YEAST, BACTERIA, CLARITYU, SPECGRAV, LEUKOCYTESUR, UROBILINOGEN, BILIRUBINUR, BLOODU, GLUCOSEU, AMORPHOUS  TSH:  No results found for: TSH  VITAMIN B12: No components found for: B12  FOLATE:  No results found for: FOLATE  RPR:  No results found for: RPR  ALEXI:  No results found for: ANATITER, ALEXI  Urine Toxicology:  No components found for: IAMMENTA, IBARBIT, IBENZO, ICOCAINE, IMARTHC, IOPIATES, IPHENCYC     IMPRESSION:    TIA r/o cardio carotid embolic event    DM    HTN    PLAN:    CT brain Mri brain neg for acute cva    CTA head neck neg    Echo    B 12 folate TSH homocysteine level    Plavix asa    Discussed dx prognosis meds side effects and above with pt and answered all questions. Gatito Chou MD  BOARD CERTIFIED-NEUROLOGY.

## 2021-02-20 NOTE — PROGRESS NOTES
[address] [place] [address]  HOSPITALIST PROGRESS NOTE                       Name:  Zackery Haines /Age/Sex: 1950  (72 y.o. male)   MRN & CSN:  [id number] & [id number] Admission Date/Time: 2021  2:38 PM   Location:  [address] [place] [address] Attending:  Jade Alvarez MD                                                  HPI  Zackery Haines is a 72 y.o. male who presents with dizziness    SUBJECTIVE  -reports feeling lightheaded, things moving around with minimal activity since yesterday. No chest pain/shortness of breath, no focal weakness nor speech abnormality    10 point review of systems reviewed and negative unless noted above. ALLERGIES: No Known Allergies    PCP: Yaneth Oleary MD    PAST MEDICAL HISTORY, SURGICAL HISTORY, SOCIAL HISTORY and  HOME MEDICATIONS all reviewed. OBJECTIVE  Vitals:    21 1927 21 0002 21 0402 21 0757   BP: 127/69 124/66 133/74 (!) 145/75   Pulse: 64 64 65 79   Resp: 17 11 15 15   Temp: 98.1 °F (36.7 °C) 98.2 °F (36.8 °C)  98.3 °F (36.8 °C)   TempSrc: Oral Oral  Oral   SpO2: 98% 99% 96% 98%   Weight:       Height:           PHYSICAL EXAM   GEN Awake male, sitting upright in bed in no apparent distress. EYES Pupils are equally round. No scleral erythema, discharge, or conjunctivitis. HENT Mucous membranes are moist. Oral pharynx without exudates, no evidence of thrush. NECK Supple, no apparent thyromegaly or masses. RESP Clear to auscultation, no wheezes, rales or rhonchi. Symmetric chest movement  CARDIO/VASC S1/S2 auscultated. Regular rate without appreciable murmurs, rubs, or gallops. No JVD or carotid bruits. Peripheral pulses equal bilaterally and palpable. No peripheral edema. GI Abdomen is soft without significant tenderness, masses, or guarding. Bowel sounds are normoactive. Rectal exam deferred.  No costovertebral angle tenderness. Normal appearing external genitalia. HEME/LYMPH No palpable cervical lymphadenopathy and no hepatosplenomegaly. No petechiae or ecchymoses. MSK Spontaneous movement of all extremities. No gross joint deformities. SKIN Normal coloration, warm, dry. NEURO Cranial nerves appear grossly intact, normal speech, no lateralizing weakness. INTAKE: No intake/output data recorded. OUTPUT: No intake/output data recorded. LABS  Recent Labs     02/19/21  1450 02/20/21  0243   WBC 8.2 8.9   HGB 14.6 13.9   HCT 47.1 44.8    302      Recent Labs     02/19/21  1450 02/20/21  0243   * 139   K 3.2* 4.2    105   CO2 24 25   BUN 14 11   CREATININE 1.1 1.0     Recent Labs     02/19/21  1450 02/20/21  0243   AST 22 26   ALT 26 22   BILITOT 0.5 0.7   ALKPHOS 72 65     Recent Labs     02/19/21  1450   INR 0.89     Recent Labs     02/19/21  1450 02/19/21  1920 02/19/21  2143   TROPONINT <0.010 <0.010 <0.010          Abnormal labs for today noted      Imaging:     ECHO:    Microbiology:  Blood culture:    Urine culture:    Sputum culture:    Procedures done this admission:    MEDS  Scheduled Meds:   sodium chloride flush  10 mL Intravenous 2 times per day    enoxaparin  40 mg Subcutaneous Daily    aspirin  81 mg Oral Daily    Or    aspirin  300 mg Rectal Daily    atorvastatin  80 mg Oral Nightly    insulin lispro  0-12 Units Subcutaneous TID WC    insulin lispro  0-6 Units Subcutaneous Nightly    dilTIAZem  120 mg Oral Daily     Continuous Infusions:   sodium chloride 75 mL/hr at 02/20/21 0802    dextrose       PRN Meds:meclizine, sodium chloride flush, promethazine **OR** ondansetron, polyethylene glycol, labetalol, glucose, dextrose, glucagon (rDNA), dextrose        ASSESSMENT and PLAN  Hospital Day: 2    1-Dizziness- presyncope/lightheadedness vs ?vertigo- CT head negative, orthostatic and MRI pending. Consult both cardio and neuro for input.  Monitor on tele    Other issues  -HTN  -DM             Disp:     Diet DIET CARB CONTROL;   DVT Prophylaxis [] Lovenox, []  Heparin, [] SCDs, [] Ambulation   GI Prophylaxis [] PPI,  [] H2 Blocker,  [] Carafate,  [] Diet/Tube Feeds   Code Status Full Code   Disposition Patient requires continued admission due to dizziness   CMS Level of Risk [] Low, [x] Moderate,[]  High  Patient's risk as above due to dizziness     LUIS ARMANDO IBARRA MD 2/20/2021 8:12 AM

## 2021-02-21 LAB
ALBUMIN SERPL-MCNC: 3.6 GM/DL (ref 3.4–5)
ANION GAP SERPL CALCULATED.3IONS-SCNC: 5 MMOL/L (ref 4–16)
BUN BLDV-MCNC: 15 MG/DL (ref 6–23)
CALCIUM SERPL-MCNC: 8.1 MG/DL (ref 8.3–10.6)
CHLORIDE BLD-SCNC: 107 MMOL/L (ref 99–110)
CO2: 26 MMOL/L (ref 21–32)
CREAT SERPL-MCNC: 1.1 MG/DL (ref 0.9–1.3)
FOLATE: 11.2 NG/ML (ref 3.1–17.5)
GFR AFRICAN AMERICAN: >60 ML/MIN/1.73M2
GFR NON-AFRICAN AMERICAN: >60 ML/MIN/1.73M2
GLUCOSE BLD-MCNC: 101 MG/DL (ref 70–99)
GLUCOSE BLD-MCNC: 113 MG/DL (ref 70–99)
GLUCOSE BLD-MCNC: 146 MG/DL (ref 70–99)
GLUCOSE BLD-MCNC: 275 MG/DL (ref 70–99)
GLUCOSE BLD-MCNC: 96 MG/DL (ref 70–99)
HCT VFR BLD CALC: 45.1 % (ref 42–52)
HEMOGLOBIN: 14.1 GM/DL (ref 13.5–18)
HOMOCYSTEINE: 8.8 UMOL/L (ref 0–10)
MCH RBC QN AUTO: 26.7 PG (ref 27–31)
MCHC RBC AUTO-ENTMCNC: 31.3 % (ref 32–36)
MCV RBC AUTO: 85.4 FL (ref 78–100)
PDW BLD-RTO: 13.4 % (ref 11.7–14.9)
PHOSPHORUS: 2.5 MG/DL (ref 2.5–4.9)
PLATELET # BLD: 288 K/CU MM (ref 140–440)
PMV BLD AUTO: 9 FL (ref 7.5–11.1)
POTASSIUM SERPL-SCNC: 4.3 MMOL/L (ref 3.5–5.1)
RBC # BLD: 5.28 M/CU MM (ref 4.6–6.2)
SODIUM BLD-SCNC: 138 MMOL/L (ref 135–145)
TSH HIGH SENSITIVITY: 1.91 UIU/ML (ref 0.27–4.2)
VITAMIN B-12: 385.5 PG/ML (ref 211–911)
WBC # BLD: 5.7 K/CU MM (ref 4–10.5)

## 2021-02-21 PROCEDURE — 80069 RENAL FUNCTION PANEL: CPT

## 2021-02-21 PROCEDURE — 84443 ASSAY THYROID STIM HORMONE: CPT

## 2021-02-21 PROCEDURE — 83090 ASSAY OF HOMOCYSTEINE: CPT

## 2021-02-21 PROCEDURE — 2580000003 HC RX 258: Performed by: NURSE PRACTITIONER

## 2021-02-21 PROCEDURE — 85027 COMPLETE CBC AUTOMATED: CPT

## 2021-02-21 PROCEDURE — 82746 ASSAY OF FOLIC ACID SERUM: CPT

## 2021-02-21 PROCEDURE — 36415 COLL VENOUS BLD VENIPUNCTURE: CPT

## 2021-02-21 PROCEDURE — 6360000002 HC RX W HCPCS: Performed by: NURSE PRACTITIONER

## 2021-02-21 PROCEDURE — 96372 THER/PROPH/DIAG INJ SC/IM: CPT

## 2021-02-21 PROCEDURE — 82962 GLUCOSE BLOOD TEST: CPT

## 2021-02-21 PROCEDURE — G0378 HOSPITAL OBSERVATION PER HR: HCPCS

## 2021-02-21 PROCEDURE — 82607 VITAMIN B-12: CPT

## 2021-02-21 PROCEDURE — 99244 OFF/OP CNSLTJ NEW/EST MOD 40: CPT | Performed by: INTERNAL MEDICINE

## 2021-02-21 PROCEDURE — 94761 N-INVAS EAR/PLS OXIMETRY MLT: CPT

## 2021-02-21 PROCEDURE — 6370000000 HC RX 637 (ALT 250 FOR IP): Performed by: PSYCHIATRY & NEUROLOGY

## 2021-02-21 PROCEDURE — 6370000000 HC RX 637 (ALT 250 FOR IP): Performed by: NURSE PRACTITIONER

## 2021-02-21 RX ADMIN — INSULIN LISPRO 2 UNITS: 100 INJECTION, SOLUTION INTRAVENOUS; SUBCUTANEOUS at 08:41

## 2021-02-21 RX ADMIN — DILTIAZEM HYDROCHLORIDE 120 MG: 120 CAPSULE, COATED, EXTENDED RELEASE ORAL at 08:41

## 2021-02-21 RX ADMIN — ASPIRIN 81 MG: 81 TABLET, FILM COATED ORAL at 08:41

## 2021-02-21 RX ADMIN — SODIUM CHLORIDE, PRESERVATIVE FREE 10 ML: 5 INJECTION INTRAVENOUS at 08:41

## 2021-02-21 RX ADMIN — SODIUM CHLORIDE, PRESERVATIVE FREE 10 ML: 5 INJECTION INTRAVENOUS at 20:12

## 2021-02-21 RX ADMIN — ATORVASTATIN CALCIUM 80 MG: 80 TABLET, FILM COATED ORAL at 20:12

## 2021-02-21 RX ADMIN — ENOXAPARIN SODIUM 40 MG: 40 INJECTION SUBCUTANEOUS at 08:42

## 2021-02-21 RX ADMIN — CLOPIDOGREL BISULFATE 75 MG: 75 TABLET ORAL at 08:41

## 2021-02-21 NOTE — CONSULTS
INPATIENT CARDIOLOGY CONSULT NOTE       Reason for consultation:  Dizziness    Primary care physician: Naveen Olson MD         Chief Complaint   Patient presents with    Dizziness       History of present Michelle Eid is a 72 y. o.year old who  presents with  Chief Complaint   Patient presents with    Dizziness       Patient is a 71-year-old -American male with prior medical history significant for diagnosed paroxysmal supraventricular tachycardia back in June 2020 and in January 2021. Patient follows up with Dr. Tere Walsh as outpatient. He was recently referred to Dr. Tasha Franklin for SVT evaluation/ablation. Patient mentions that he has been taking dietary supplements Trulicity which causes tachycardia. He has taken himself off the medication for the past 2 weeks. Patient now presents with chief complaint of dizziness as well as bilateral lower extremity weakness. His symptoms have gradually improved while being in the hospital and is working with PT OT. Currently he asymptomatic    EKG shows normal sinus rhythm without any ischemic changes. 1 PVC noted. Past medical history:    has a past medical history of H/O echocardiogram, History of exercise stress test, Hyperlipidemia, Hypertension, PSVT (paroxysmal supraventricular tachycardia) ([facility] [address] and Type 2 diabetes mellitus ([facility] [address] Past surgical history:   has no past surgical history on file. Social History:   reports that he has never smoked. He has never used smokeless tobacco. He reports previous alcohol use. He reports that he does not use drugs.   Family history:   no family history of CAD, STROKE of DM    No Known Allergies        meclizine (ANTIVERT) tablet 25 mg, TID PRN      clopidogrel (PLAVIX) tablet 75 mg, Daily      sodium chloride flush 0.9 % injection 10 mL, 2 times per day      sodium chloride flush 0.9 % injection 10 mL, PRN      promethazine (PHENERGAN) tablet 12.5 mg, Q6H PRN    Or      ondansetron (ZOFRAN) injection 4 mg, Q6H PRN      polyethylene glycol (GLYCOLAX) packet 17 g, Daily PRN      enoxaparin (LOVENOX) injection 40 mg, Daily      aspirin EC tablet 81 mg, Daily    Or      aspirin suppository 300 mg, Daily      atorvastatin (LIPITOR) tablet 80 mg, Nightly      labetalol (NORMODYNE;TRANDATE) injection 10 mg, Q10 Min PRN      insulin lispro (HUMALOG) injection vial 0-12 Units, TID WC      insulin lispro (HUMALOG) injection vial 0-6 Units, Nightly      glucose (GLUTOSE) 40 % oral gel 15 g, PRN      dextrose 50 % IV solution, PRN      glucagon (rDNA) injection 1 mg, PRN      dextrose 5 % solution, PRN      dilTIAZem (CARDIZEM CD) extended release capsule 120 mg, Daily      Current Facility-Administered Medications   Medication Dose Route Frequency Provider Last Rate Last Admin    meclizine (ANTIVERT) tablet 25 mg  25 mg Oral TID PRN Taya Bar MD   25 mg at 02/20/21 2143    clopidogrel (PLAVIX) tablet 75 mg  75 mg Oral Daily Sravani Lewis MD   75 mg at 02/21/21 0841    sodium chloride flush 0.9 % injection 10 mL  10 mL Intravenous 2 times per day Clint Noel, APRN - CNP   10 mL at 02/21/21 0841    sodium chloride flush 0.9 % injection 10 mL  10 mL Intravenous PRN Clint Noel, APRN - [place]        promethazine (PHENERGAN) tablet 12.5 mg  12.5 mg Oral Q6H PRN Clint Noel, APRN - CNP        Or    ondansetron (ZOFRAN) injection 4 mg  4 mg Intravenous Q6H PRN Clint Noel, APRN - CNP        polyethylene glycol (GLYCOLAX) packet 17 g  17 g Oral Daily PRN Clint Noel, APRN - CNP        enoxaparin (LOVENOX) injection 40 mg  40 mg Subcutaneous Daily Clint Noel, APRN - CNP   40 mg at 02/21/21 0842    aspirin EC tablet 81 mg  81 mg Oral Daily Marvelyhaleigh Noel, APRN - CNP   81 mg at 02/21/21 7638    Or    aspirin suppository 300 mg  300 mg Rectal Daily Clint Noel, APRN - CNP        atorvastatin (LIPITOR) tablet 80 mg  80 mg Oral Nightly Marnanette Noel, APRN - CNP   80 mg at 02/20/21 2149    labetalol (NORMODYNE;TRANDATE) injection 10 mg  10 mg Intravenous Q10 Min PRN Venkat Tenzin, APRN - CNP        insulin lispro (HUMALOG) injection vial 0-12 Units  0-12 Units Subcutaneous TID WC Venkat Tenzin, APRN - CNP   2 Units at 02/21/21 0841    insulin lispro (HUMALOG) injection vial 0-6 Units  0-6 Units Subcutaneous Nightly Venkat Tenzin, APRN - CNP   1 Units at 02/20/21 2145    glucose (GLUTOSE) 40 % oral gel 15 g  15 g Oral PRN Venkat Tenzin, APRN - CNP        dextrose 50 % IV solution  12.5 g Intravenous PRN Venkat Tenzin, APRN - CNP        glucagon (rDNA) injection 1 mg  1 mg Intramuscular PRN Venkat Tenzin, APRN - CNP        dextrose 5 % solution  100 mL/hr Intravenous PRN Venkat Tenzin, APRN - CNP        dilTIAZem (CARDIZEM CD) extended release capsule 120 mg  120 mg Oral Daily Venkat Tenzin, APRN - CNP   120 mg at 02/21/21 8427         Review of Systems:     · Constitutional: No Fever or Weight Loss   · Eyes: No Decreased Vision  · ENT: No Headaches, Hearing Loss or Vertigo  · Cardiovascular: No chest pain, dyspnea on exertion, palpitations or loss of consciousness  · Respiratory: No cough or wheezing    · Gastrointestinal: No abdominal pain, appetite loss, blood in stools, constipation, diarrhea or heartburn  · Genitourinary: No dysuria, trouble voiding, or hematuria  · Musculoskeletal:  No gait disturbance, weakness or joint complaints  · Integumentary: No rash or pruritis  · Neurological: No TIA or stroke symptoms  · Psychiatric: No anxiety or depression  · Endocrine: No malaise, fatigue or temperature intolerance  · Hematologic/Lymphatic: No bleeding problems, blood clots or swollen lymph nodes  · Allergic/Immunologic: No nasal congestion or hives    All other systems were reviewed and were negative otherwise.          Physical Examination:      Vitals:    02/21/21 0834   BP: 132/70   Pulse: 65   Resp: 19   Temp: 98.6 °F (37 °C)   SpO2: 95%      Wt Readings from Last 3 Encounters:   02/21/21 198 lb 8 oz (90 kg) 02/15/21 203 lb 11.2 oz (92.4 kg)   02/12/21 208 lb (94.3 kg)     Body mass index is 24.81 kg/m². General Appearance:  No distress, conversant  Constitutional:  Well developed, Well nourished  HEENT:  Normocephalic, Atraumatic, Oropharynx moist, No oral exudates,   Nose normal. Neck Supple Carotid: no carotid bruit  Eyes:  Conjunctiva normal, No discharge. Respiratory:    Normal breath sounds, No respiratory distress, No wheezing, no use of accessory muscles, diaphragm movement is normal  No chest Tenderness  Cardiovascular: S1-S2 No murmurs auscultated. No rubs, thrills or gallops. Normal rhythm. Pedal pulses are normal. No pedal edema  GI:  Soft Non tender, non distended. :  no CVA tenderness. Musculoskeletal:   No tenderness, No cyanosis, No clubbing. Integument:  Warm, Dry, No erythema, No rash. Lymphatic:  No lymphadenopathy noted. Neurologic:  Alert & oriented x 3  No focal deficits noted. Psychiatric:  Affect normal, Judgment normal, Mood normal.       Lab Review     Recent Labs     02/21/21  0323   WBC 5.7   HGB 14.1   HCT 45.1         Recent Labs     02/21/21  0323      K 4.3      CO2 26   PHOS 2.5   BUN 15   CREATININE 1.1     Recent Labs     02/20/21  0243   AST 26   ALT 22   BILITOT 0.7   ALKPHOS 65     No results for input(s): TROPONINI in the last 72 hours. No results found for: BNP  Lab Results   Component Value Date    INR 0.89 02/19/2021    PROTIME 10.7 (L) 02/19/2021         All labs, images, EKGs were personally reviewed      Assessment: 72 y. o.year old with PMH of  has a past medical history of H/O echocardiogram, History of exercise stress test, Hyperlipidemia, Hypertension, PSVT (paroxysmal supraventricular tachycardia) ([address] and Type 2 diabetes mellitus ([address] Recommendations:      1. History of paroxysmal supraventricular tachycardia: Patient evaluated by Dr. Olga Gan in recent past.  Offered EP study but declined.   I discussed the pros and cons of EP study/ablation with the patient again. Patient would like to discuss options with his primary care physician first, before considering ablation. Recommend 30-day event monitor upon discharge. Procedure. There is no new documentation of SVT since last episode in January. We recommend low-dose beta-blocker, metoprolol 12.5 mg twice daily. 2. Dizziness: Unclear etiology, from history does not appear to be strokelike symptoms. Neurology following. CT scan and MRI are negative for acute stroke. Echocardiogram is ordered which we will review. 3. Essential hypertension: Blood pressure 196M systolic start patient on beta-blocker. 4. Hyperlipidemia: Recommend to restart home dose of Lipitor 20 mg daily.      Thank you for the consult    Dr. Litzy Pan  2/21/2021 9:19 AM

## 2021-02-21 NOTE — PROGRESS NOTES
[address] [place] [address]  HOSPITALIST PROGRESS NOTE                       Name:  Yonny Springer /Age/Sex: 1950  [de-identified]72 y.o. male)   MRN & CSN:  [id number] & [id number] Admission Date/Time: 2021  2:38 PM   Location:  [address] [place] [address] Attending:  Tri Jansen MD                                                  HPI  Yonny Springer is a 72 y.o. male who presents with dizziness    SUBJECTIVE  -denies any dizziness, lightheadedness today, orthostatic negative yesterday    10 point review of systems reviewed and negative unless noted above. ALLERGIES: No Known Allergies    PCP: Sandra Schrader MD    PAST MEDICAL HISTORY, SURGICAL HISTORY, SOCIAL HISTORY and  HOME MEDICATIONS all reviewed. OBJECTIVE  Vitals:    21 1554 21 2130 21 0400 21 0834   BP: 136/72 121/74 112/62 132/70   Pulse: 70   65   Resp: 23   19   Temp: 98 °F (36.7 °C) 97.8 °F (36.6 °C) 97.7 °F (36.5 °C) 98.6 °F (37 °C)   TempSrc: Oral Oral Oral Oral   SpO2: 97%  99% 95%   Weight:   198 lb 8 oz (90 kg)    Height:           PHYSICAL EXAM   GEN Awake male, sitting upright in bed in no apparent distress. EYES Pupils are equally round. No scleral erythema, discharge, or conjunctivitis. HENT Mucous membranes are moist. Oral pharynx without exudates, no evidence of thrush. NECK Supple, no apparent thyromegaly or masses. RESP Clear to auscultation, no wheezes, rales or rhonchi. Symmetric chest movement  CARDIO/VASC S1/S2 auscultated. Regular rate without appreciable murmurs, rubs, or gallops. No JVD or carotid bruits. Peripheral pulses equal bilaterally and palpable. No peripheral edema. GI Abdomen is soft without significant tenderness, masses, or guarding. Bowel sounds are normoactive. Rectal exam deferred.  No costovertebral angle tenderness. Normal appearing external genitalia. HEME/LYMPH No palpable cervical lymphadenopathy and no hepatosplenomegaly. No petechiae or ecchymoses.   MSK Spontaneous movement of all Ambulation   GI Prophylaxis [] PPI,  [] H2 Blocker,  [] Carafate,  [] Diet/Tube Feeds   Code Status Full Code   Disposition Patient requires continued admission due to dizziness   CMS Level of Risk [] Low, [x] Moderate,[]  High  Patient's risk as above due to dizziness     LUIS ARMANDO IBARRA MD 2/21/2021 9:21 AM

## 2021-02-21 NOTE — PLAN OF CARE
Problem: Falls - Risk of:  Goal: Will remain free from falls  Description: Will remain free from falls  2/21/2021 1117 by Lucy Groves LPN  Outcome: Ongoing  2/21/2021 0200 by Jazmin Mckeon RN  Outcome: Ongoing  Goal: Absence of physical injury  Description: Absence of physical injury  2/21/2021 1117 by Lucy Groves LPN  Outcome: Ongoing  2/21/2021 0200 by Jazmin Mckeon RN  Outcome: Ongoing     Problem: HEMODYNAMIC STATUS  Goal: Patient has stable vital signs and fluid balance  Outcome: Completed     Problem: ACTIVITY INTOLERANCE/IMPAIRED MOBILITY  Goal: Mobility/activity is maintained at optimum level for patient  Outcome: Ongoing     Problem: COMMUNICATION IMPAIRMENT  Goal: Ability to express needs and understand communication  Outcome: Completed     Problem: Mobility - Impaired:  Goal: Mobility will improve  Description: Mobility will improve  Outcome: Ongoing     Problem: Nausea/Vomiting:  Goal: Absence of nausea/vomiting  Description: Absence of nausea/vomiting  Outcome: Completed  Goal: Able to drink  Description: Able to drink  Outcome: Completed  Goal: Able to eat  Description: Able to eat  Outcome: Completed  Goal: Ability to achieve adequate nutritional intake will improve  Description: Ability to achieve adequate nutritional intake will improve  Outcome: Completed

## 2021-02-21 NOTE — PROGRESS NOTES
Patient ambulated 1 lap around unit with this nurse and standby assistance. Pt unsteady on his feet at times and states that he feels much weaker in his lower legs at the moment than he usually does. No report of dizziness or lightheadedness. Vital signs stable.

## 2021-02-22 LAB
GLUCOSE BLD-MCNC: 119 MG/DL (ref 70–99)
GLUCOSE BLD-MCNC: 130 MG/DL (ref 70–99)
GLUCOSE BLD-MCNC: 142 MG/DL (ref 70–99)
GLUCOSE BLD-MCNC: 170 MG/DL (ref 70–99)

## 2021-02-22 PROCEDURE — 2580000003 HC RX 258: Performed by: NURSE PRACTITIONER

## 2021-02-22 PROCEDURE — 6360000002 HC RX W HCPCS: Performed by: NURSE PRACTITIONER

## 2021-02-22 PROCEDURE — 94761 N-INVAS EAR/PLS OXIMETRY MLT: CPT

## 2021-02-22 PROCEDURE — 6370000000 HC RX 637 (ALT 250 FOR IP): Performed by: NURSE PRACTITIONER

## 2021-02-22 PROCEDURE — 99214 OFFICE O/P EST MOD 30 MIN: CPT | Performed by: INTERNAL MEDICINE

## 2021-02-22 PROCEDURE — 6370000000 HC RX 637 (ALT 250 FOR IP): Performed by: PSYCHIATRY & NEUROLOGY

## 2021-02-22 PROCEDURE — 96372 THER/PROPH/DIAG INJ SC/IM: CPT

## 2021-02-22 PROCEDURE — G0378 HOSPITAL OBSERVATION PER HR: HCPCS

## 2021-02-22 PROCEDURE — 82962 GLUCOSE BLOOD TEST: CPT

## 2021-02-22 PROCEDURE — APPSS60 APP SPLIT SHARED TIME 46-60 MINUTES: Performed by: NURSE PRACTITIONER

## 2021-02-22 RX ADMIN — ATORVASTATIN CALCIUM 80 MG: 80 TABLET, FILM COATED ORAL at 20:27

## 2021-02-22 RX ADMIN — ASPIRIN 81 MG: 81 TABLET, FILM COATED ORAL at 10:15

## 2021-02-22 RX ADMIN — SODIUM CHLORIDE, PRESERVATIVE FREE 10 ML: 5 INJECTION INTRAVENOUS at 20:28

## 2021-02-22 RX ADMIN — INSULIN LISPRO 2 UNITS: 100 INJECTION, SOLUTION INTRAVENOUS; SUBCUTANEOUS at 17:34

## 2021-02-22 RX ADMIN — ENOXAPARIN SODIUM 40 MG: 40 INJECTION SUBCUTANEOUS at 10:15

## 2021-02-22 RX ADMIN — SODIUM CHLORIDE, PRESERVATIVE FREE 10 ML: 5 INJECTION INTRAVENOUS at 10:15

## 2021-02-22 RX ADMIN — DILTIAZEM HYDROCHLORIDE 120 MG: 120 CAPSULE, COATED, EXTENDED RELEASE ORAL at 10:15

## 2021-02-22 RX ADMIN — CLOPIDOGREL BISULFATE 75 MG: 75 TABLET ORAL at 10:15

## 2021-02-22 ASSESSMENT — PAIN SCALES - GENERAL: PAINLEVEL_OUTOF10: 0

## 2021-02-22 NOTE — PROGRESS NOTES
[address] [place] [address]  HOSPITALIST PROGRESS NOTE                       Name:  Lucas Mcallister /Age/Sex: 1950  [de-identified]72 y.o. male)   MRN & CSN:  [id number] & [id number] Admission Date/Time: 2021  2:38 PM   Location:  [address] [place] [address] Attending:  Manjula Gates MD                                                  HPI  Lucas Mcallister is a 72 y.o. male who presents with dizziness    SUBJECTIVE  No acute events doing okay, still waiting for echo    10 point review of systems reviewed and negative unless noted above. ALLERGIES: No Known Allergies    PCP: Jolynn Sanchez MD    PAST MEDICAL HISTORY, SURGICAL HISTORY, SOCIAL HISTORY and  HOME MEDICATIONS all reviewed. OBJECTIVE  Vitals:    21 1454 21 0210 21 0915   BP: (!) 142/84 134/72 (!) 121/56 (!) 147/76   Pulse:  64 60 70   Resp:     Temp: 98.3 °F (36.8 °C) 98.4 °F (36.9 °C) 98.3 °F (36.8 °C) 98.4 °F (36.9 °C)   TempSrc: Oral Oral Oral Oral   SpO2:  97%  97%   Weight:   198 lb (89.8 kg)    Height:           PHYSICAL EXAM   GEN Awake male, sitting upright in bed in no apparent distress. EYES Pupils are equally round. No scleral erythema, discharge, or conjunctivitis. HENT Mucous membranes are moist. Oral pharynx without exudates, no evidence of thrush. NECK Supple, no apparent thyromegaly or masses. RESP Clear to auscultation, no wheezes, rales or rhonchi. Symmetric chest movement  CARDIO/VASC S1/S2 auscultated. Regular rate without appreciable murmurs, rubs, or gallops. No JVD or carotid bruits. Peripheral pulses equal bilaterally and palpable. No peripheral edema. GI Abdomen is soft without significant tenderness, masses, or guarding. Bowel sounds are normoactive. Rectal exam deferred.  No costovertebral angle tenderness. Normal appearing external genitalia. HEME/LYMPH No palpable cervical lymphadenopathy and no hepatosplenomegaly. No petechiae or ecchymoses. MSK Spontaneous movement of all extremities.   No gross joint deformities. SKIN Normal coloration, warm, dry. NEURO Cranial nerves appear grossly intact, normal speech, no lateralizing weakness. INTAKE: In: 250 [P.O.:240; I.V.:10]  Out: -   OUTPUT: In: 250   Out: -     LABS  Recent Labs     02/19/21  1450 02/20/21  0243 02/21/21  0323   WBC 8.2 8.9 5.7   HGB 14.6 13.9 14.1   HCT 47.1 44.8 45.1    302 288      Recent Labs     02/19/21  1450 02/20/21  0243 02/21/21  0323   * 139 138   K 3.2* 4.2 4.3    105 107   CO2 24 25 26   PHOS  --   --  2.5   BUN 14 11 15   CREATININE 1.1 1.0 1.1     Recent Labs     02/19/21  1450 02/20/21  0243   AST 22 26   ALT 26 22   BILITOT 0.5 0.7   ALKPHOS 72 65     Recent Labs     02/19/21  1450   INR 0.89     Recent Labs     02/19/21  1450 02/19/21  1920 02/19/21  2143   TROPONINT <0.010 <0.010 <0.010          Abnormal labs for today noted      Imaging:     ECHO:    Microbiology:  Blood culture:    Urine culture:    Sputum culture:    Procedures done this admission:    MEDS  Scheduled Meds:   clopidogrel  75 mg Oral Daily    sodium chloride flush  10 mL Intravenous 2 times per day    enoxaparin  40 mg Subcutaneous Daily    aspirin  81 mg Oral Daily    Or    aspirin  300 mg Rectal Daily    atorvastatin  80 mg Oral Nightly    insulin lispro  0-12 Units Subcutaneous TID WC    insulin lispro  0-6 Units Subcutaneous Nightly    dilTIAZem  120 mg Oral Daily     Continuous Infusions:   dextrose       PRN Meds:meclizine, sodium chloride flush, promethazine **OR** ondansetron, polyethylene glycol, labetalol, glucose, dextrose, glucagon (rDNA), dextrose        ASSESSMENT and PLAN  Hospital Day: 4    1-Dizziness- presyncope/lightheadedness vs ?vertigo- CT head negative, orthostatic negative and MRI negative. Echo pending, cardio consult noted    Other issues  -HTN  -DM    D/w neuro wants to wait for echo prior to discharge which is still pending           Disp:     Diet DIET CARB CONTROL;   Dietary Nutrition Supplements: Low Calorie High Protein Supplement   DVT Prophylaxis [] Lovenox, []  Heparin, [] SCDs, [] Ambulation   GI Prophylaxis [] PPI,  [] H2 Blocker,  [] Carafate,  [] Diet/Tube Feeds   Code Status Full Code   Disposition Patient requires continued admission due to dizziness   CMS Level of Risk [] Low, [x] Moderate,[]  High  Patient's risk as above due to dizziness     LUIS ARMANDO IBARRA MD 2/22/2021 12:34 PM

## 2021-02-22 NOTE — PROGRESS NOTES
Occupational Therapy  Chart reviewed. Pt is independent during this admission with community level mobility and ADL. MRI negative for brain abnormality. Will d/c order.    [address], OTR/L, [address] [facility]   [id number]   11:07 AM, 2/22/2021

## 2021-02-22 NOTE — PLAN OF CARE
Problem: Falls - Risk of:  Goal: Will remain free from falls  Description: Will remain free from falls  2/22/2021 1254 by Salvador Juan LPN  Outcome: Ongoing     Problem: Falls - Risk of:  Goal: Absence of physical injury  Description: Absence of physical injury  2/22/2021 1254 by Salvador Juan LPN  Outcome: Ongoing     Problem: ACTIVITY INTOLERANCE/IMPAIRED MOBILITY  Goal: Mobility/activity is maintained at optimum level for patient  2/22/2021 0032 by Randy Herrera LPN  Outcome: Ongoing     Problem: Mobility - Impaired:  Goal: Mobility will improve  Description: Mobility will improve  2/22/2021 0032 by Randy Herrera LPN  Outcome: Ongoing

## 2021-02-22 NOTE — PLAN OF CARE
Problem: Falls - Risk of:  Goal: Will remain free from falls  Description: Will remain free from falls  2/22/2021 0032 by Wilson Morgan LPN  Outcome: Ongoing  2/21/2021 1117 by Janice Macdonald LPN  Outcome: Ongoing  Goal: Absence of physical injury  Description: Absence of physical injury  2/22/2021 0032 by Wilson Morgan LPN  Outcome: Ongoing  2/21/2021 1117 by Janice Macdonald LPN  Outcome: Ongoing     Problem: ACTIVITY INTOLERANCE/IMPAIRED MOBILITY  Goal: Mobility/activity is maintained at optimum level for patient  2/22/2021 0032 by Wilson Morgan LPN  Outcome: Ongoing  2/21/2021 1117 by Janice Macdonald LPN  Outcome: Ongoing     Problem: Mobility - Impaired:  Goal: Mobility will improve  Description: Mobility will improve  2/22/2021 0032 by Wilson Morgan LPN  Outcome: Ongoing  2/21/2021 1117 by Janice Macdonald LPN  Outcome: Ongoing

## 2021-02-22 NOTE — PROGRESS NOTES
Cardiology Progress Note     Today's Plan: 30 day event monitor at discharge. Admit Date:  2/19/2021    Consult reason/ Seen today for: dizziness    Subjective and  Overnight Events:  Patient reports improved dizziness, denies any chest pain or shortness of breath. Assessment / Plan / Recommendation:     1. History of paroxysmal supraventricular tachycardia: Patient evaluated by Dr. Bobbi Quiles in recent past.  Offered EP study but declined. I discussed the pros and cons of EP study/ablation with the patient again. Patient would like to discuss options with his primary care physician first, before considering ablation. Recommend 30-day event monitor upon discharge. There is no new documentation of SVT since last episode in January. Continue with Cardizem. 2. Dizziness: Unclear etiology, from history does not appear to be strokelike symptoms. Neurology following. CT scan and MRI are negative for acute stroke. Echocardiogram is ordered which we will review. 3. Essential hypertension: stable on Cardizem 120 mg, no BB due to bradycardia. 4. Hyperlipidemia: Recommend to restart home dose of Lipitor 20 mg daily. 5. DVT prophylaxis if not contraindicated. 6. If no abnormalities seen on Echo, patient is okay to be discharged from a cardiac standpoint. History of Presenting Illness:    Chief complain on admission : 72 y. o.year old who is admitted for  Chief Complaint   Patient presents with    Dizziness        Past medical history:    has a past medical history of H/O echocardiogram, History of exercise stress test, Hyperlipidemia, Hypertension, PSVT (paroxysmal supraventricular tachycardia) ([address] and Type 2 diabetes mellitus ([address] Past surgical history:   has no past surgical history on file. Social History:   reports that he has never smoked. He has never used smokeless tobacco. He reports previous alcohol use. He reports that he does not use drugs. Family history:  family history includes Cancer in his father; Diabetes in his mother and sister; Heart Disease in his mother; High Blood Pressure in his sister; High Cholesterol in his mother and sister. No Known Allergies    Review of Systems:  Review of Systems   All other systems reviewed and are negative. BP (!) 147/76   Pulse 70   Temp 98.4 °F (36.9 °C) (Oral)   Resp 21   Ht 6' 3\" (1.905 m)   Wt 198 lb (89.8 kg)   SpO2 97%   BMI 24.75 kg/m²       Intake/Output Summary (Last 24 hours) at 2/22/2021 1052  Last data filed at 2/22/2021 0731  Gross per 24 hour   Intake 250 ml   Output --   Net 250 ml       Physical Exam:  Constitutional:  Well developed, Well nourished, No acute distress  HENT:  Normocephalic, Atraumatic, Bilateral external ears normal,  Nose normal.   Neck- trachea is midline  Eyes:  PERRL, Conjunctiva normal  Respiratory:  Normal breath sounds, No respiratory distress, No wheezing, No chest tenderness. Cardiovascular:  Normal heart rate, Normal rhythm, no murmurs appreciated, No rubs appreciated, No gallops appreciated, JVP not elevated  Abdomen/GI:  Soft, No tenderness  Musculoskeletal:  Intact distal pulses, no edema, No tenderness, No cyanosis, No clubbing. Integument:  Warm, Dry  Lymphatic:  No lymphadenopathy noted.    Neurologic:  Alert & oriented  Psychiatric:  Affect and Mood :pleasant     Medications:    clopidogrel  75 mg Oral Daily    sodium chloride flush  10 mL Intravenous 2 times per day    enoxaparin  40 mg Subcutaneous Daily    aspirin  81 mg Oral Daily    Or    aspirin  300 mg Rectal Daily    atorvastatin  80 mg Oral Nightly    insulin lispro  0-12 Units Subcutaneous TID WC    insulin lispro  0-6 Units Subcutaneous Nightly    dilTIAZem  120 mg Oral Daily      dextrose       meclizine, sodium chloride flush, promethazine **OR** ondansetron, polyethylene glycol, labetalol, glucose, dextrose, glucagon (rDNA), dextrose    Lab Data:  CBC:   Recent Labs     02/19/21  1450 02/20/21  0243 02/21/21  0323   WBC 8.2 8.9 5.7   HGB 14.6 13.9 14.1   HCT 47.1 44.8 45.1   MCV 83.4 85.3 85.4    302 288     BMP:   Recent Labs     02/19/21  1450 02/20/21  0243 02/21/21  0323   * 139 138   K 3.2* 4.2 4.3    105 107   CO2 24 25 26   PHOS  --   --  2.5   BUN 14 11 15   CREATININE 1.1 1.0 1.1     PT/INR:   Recent Labs     02/19/21  1450   PROTIME 10.7*   INR 0.89     BNP:  No results for input(s): PROBNP in the last 72 hours. TROPONIN:   Recent Labs     02/19/21  1450 02/19/21  1920 02/19/21  2143   Marjan Patch <0.010 <0.010 <0.010        ECHO : 10/6/20  Left ventricular function is normal, EF is estimated at 55-60%. Mild left ventricular hypertrophy with Grade I diastolic dysfunction. No regional wall motion abnormalities were detected. Mild tricuspid regurgitation. No significant valvular disease noted. No evidence of pericardial effusion. Impression:  Active Problems:    Stroke-like symptoms  Resolved Problems:    * No resolved hospital problems. *       All labs, medications and tests reviewed by myself, continue all other medications of all above medical condition listed as is except for changes mentioned above. Thank you   Please call with questions. Electronically signed by Dino Rivas. SONNY Burroughs CNP on 2/22/2021 at 10:52 AM         CARDIOLOGY ATTENDING ADDENDUM    I have seen, spoken to and examined this patient personally, independently of the nurse practitioner. I have reviewed the hospital care given to date and reviewed all pertinent labs and imaging. The plan was developed mutually at the time of the visit with the patient,  NP   and myself. I have spoken with patient, nursing staff and provided written and verbal instructions . The above note has been reviewed and I agree with the assessment, diagnosis, and treatment plan with changes made by me as follows       HPI:  I have reviewed the above HPI  And agree with above   Please review addendum/changes made to note above     Interval history:            Physical Exam:  General:  Awake, alert, NAD  Head:normal  Eye:normal  Neck:  No JVD   Chest:  Clear to auscultation, respiration easy  Cardiovascular:  s1s2  Abdomen:   nontender  Extremities:  no edema  Pulses; palpable  Neuro: grossly normal      MEDICAL DECISION MAKING;    I agree with the above plan, which was planned by myself and discussed with NP.     Dr. Sean Chakraborty MD

## 2021-02-22 NOTE — CARE COORDINATION
Chart reviewed, CM called and spoke with patient on the hospital phone for dc planning. Introduced self and reason for call. Pt was admitted for dizziness. States he lives at home alone though will be going to his girlfriends at discharge for a week or so. States he has a cane and walker at home though does not anticipate needing either. Explained he follows with PCP, has insurance with affordable RX co-pays and reliable transportation per himself or family. Discussed HHC and pt declined [address] need nor any other discharge needs. CM remains available if needs arise.

## 2021-02-22 NOTE — PROGRESS NOTES
NEUROLOGY NOTE  DR. Yaneth Cuevas MD.  -------------------------------------------------  Subjective:    Doing better. Denies any new symptoms. Denies headache nausea vomiting dizziness    Denies numbness weakness extremities    Denies blurring of vision double vision    Objective:    /72   Pulse 64   Temp 98.4 °F (36.9 °C) (Oral)   Resp 18   Ht 6' 3\" (1.905 m)   Wt 198 lb 8 oz (90 kg)   SpO2 97%   BMI 24.81 kg/m²   HEENT nl      Neuro exam    Alert Oriented  X 3  Follow simple commands  EOMI Pupils 3 mm zeny  5/5 all 4 extremities      RADIOLOGY  -----------------    Ct Head Wo Contrast    Result Date: 2/19/2021  EXAMINATION: CT OF THE HEAD WITHOUT CONTRAST  2/19/2021 3:00 pm TECHNIQUE: CT of the head was performed without the administration of intravenous contrast. Dose modulation, iterative reconstruction, and/or weight based adjustment of the mA/kV was utilized to reduce the radiation dose to as low as reasonably achievable. COMPARISON: None. HISTORY: ORDERING SYSTEM PROVIDED HISTORY: dizziness TECHNOLOGIST PROVIDED HISTORY: Has a \"code stroke\" or \"stroke alert\" been called? ->Yes Reason for exam:->dizziness Reason for Exam: AMS/ dizziness/ nausea Acuity: Acute Type of Exam: Initial FINDINGS: BRAIN/VENTRICLES: There is no acute intracranial hemorrhage, mass effect or midline shift. No abnormal extra-axial fluid collection. The gray-white differentiation is maintained without evidence of an acute infarct. There is no evidence of hydrocephalus. ORBITS: The visualized portion of the orbits demonstrate no acute abnormality. SINUSES: The visualized paranasal sinuses and mastoid air cells demonstrate no acute abnormality. SOFT TISSUES/SKULL:  No acute abnormality of the visualized skull or soft tissues. No acute intracranial abnormality. Critical results were called by Dr. Estrellita Upton.  Leeanna Oates MD to [address] [facility] emergency department on 2/19/2021 at 15:08. Xr Chest Portable    Result Date: 2/19/2021  EXAMINATION: ONE XRAY VIEW OF THE CHEST 2/19/2021 3:14 pm COMPARISON: None. HISTORY: ORDERING SYSTEM PROVIDED HISTORY: dizziness TECHNOLOGIST PROVIDED HISTORY: Reason for exam:->dizziness Reason for Exam: dizziness Acuity: Acute Type of Exam: Initial Additional signs and symptoms: unknown Relevant Medical/Surgical History: none FINDINGS: The lungs are without acute focal process. There is no effusion or pneumothorax. The cardiomediastinal silhouette is without acute process. The osseous structures are without acute process. No acute process. Cta Head Neck W Contrast    Result Date: 2/19/2021  EXAMINATION: CTA OF THE HEAD AND NECK WITH CONTRAST 2/19/2021 3:17 pm: TECHNIQUE: CTA of the head and neck was performed with the administration of intravenous contrast. Multiplanar reformatted images are provided for review. MIP images are provided for review. Stenosis of the internal carotid arteries measured using NASCET criteria. Dose modulation, iterative reconstruction, and/or weight based adjustment of the mA/kV was utilized to reduce the radiation dose to as low as reasonably achievable. COMPARISON: None. HISTORY: ORDERING SYSTEM PROVIDED HISTORY: stroke alert dizziness TECHNOLOGIST PROVIDED HISTORY: Reason for exam:->stroke alert dizziness Decision Support Exception->Emergency Medical Condition (MA) Reason for Exam: stroke alert, dizziness/nausea Acuity: Acute Type of Exam: Initial Additional signs and symptoms: 80ml Isovue 370 Relevant Medical/Surgical History: hx HTN FINDINGS: CTA NECK: AORTIC ARCH/ARCH VESSELS: No dissection or arterial injury. No significant stenosis of the brachiocephalic or subclavian arteries. CAROTID ARTERIES: No dissection, arterial injury, or hemodynamically significant stenosis by NASCET criteria. VERTEBRAL ARTERIES: No dissection, arterial injury, or significant stenosis. SOFT TISSUES: The lung apices are clear. No cervical or superior mediastinal lymphadenopathy. The larynx and pharynx are unremarkable. No acute abnormality of the salivary and thyroid glands. There is mucosal thickening of the paranasal sinuses with near complete opacification of the right maxillary antrum. BONES: There are degenerative changes of the spine and numerous dental caries are present. CTA HEAD: ANTERIOR CIRCULATION: No significant stenosis of the intracranial internal carotid, anterior cerebral, or middle cerebral arteries. No aneurysm. POSTERIOR CIRCULATION: No significant stenosis of the vertebral, basilar, or posterior cerebral arteries. No aneurysm. OTHER: No dural venous sinus thrombosis on this non-dedicated study. BRAIN: See separately dictated noncontrast head CT report. No flow limiting stenosis or large vessel occlusion visualized within the head or neck. Paranasal sinus inflammatory mucosal disease with near complete opacification of the right maxillary sinus. Severe dental disease. Mri Brain Without Contrast    Result Date: 2/20/2021  EXAMINATION: MRI OF THE BRAIN WITHOUT CONTRAST  2/20/2021 11:10 am TECHNIQUE: Multiplanar multisequence MRI of the brain was performed without the administration of intravenous contrast. COMPARISON: CT angiogram brain 02/19/2021 HISTORY: ORDERING SYSTEM PROVIDED HISTORY: Stroke evaluation TECHNOLOGIST PROVIDED HISTORY: Reason for exam:->Stroke evaluation Decision Support Exception->Emergency Medical Condition (MA) Reason for Exam: pt having dizziness and nausea FINDINGS: INTRACRANIAL STRUCTURES/VENTRICLES: There are no areas of restricted diffusion identified to suggest an acute infarct. There is no acute intracranial hemorrhage. No mass effect or midline shift is present. There are several foci of increased T2/FLAIR signal intensity within the periventricular and deep white matter. There is no sellar or suprasellar mass present.   There is no ventriculomegaly or abnormal extra-axial fluid collection present. The proximal portions of the [place] demonstrate normal flow voids. ORBITS: Limited evaluation of the orbits is unremarkable. SINUSES: There is complete opacification of the right maxillary sinus. Mucosal thickening is present throughout the paranasal sinuses. There is trace fluid within the right mastoid air cells. The left mastoid air cells are clear. No fluid levels are identified. BONES/SOFT TISSUES: Bone marrow signal intensity is normal.     1. No acute infarct or acute intracranial process identified. 2. Mild chronic small vessel ischemic changes. 3. Chronic right maxillary sinusitis.        LAB RESULTS  --------------------    Recent Results (from the past 24 hour(s))   Renal function panel    Collection Time: 02/21/21  3:23 AM   Result Value Ref Range    Sodium 138 135 - 145 MMOL/L    Potassium 4.3 3.5 - 5.1 MMOL/L    Chloride 107 99 - 110 mMol/L    CO2 26 21 - 32 MMOL/L    Anion Gap 5 4 - 16    BUN 15 6 - 23 MG/DL    CREATININE 1.1 0.9 - 1.3 MG/DL    Glucose 113 (H) 70 - 99 MG/DL    Calcium 8.1 (L) 8.3 - 10.6 MG/DL    GFR Non-African American >60 >60 mL/min/1.73m2    GFR African American >60 >60 mL/min/1.73m2    Albumin 3.6 3.4 - 5.0 GM/DL    Phosphorus 2.5 2.5 - 4.9 MG/DL   CBC    Collection Time: 02/21/21  3:23 AM   Result Value Ref Range    WBC 5.7 4.0 - 10.5 K/CU MM    RBC 5.28 4.6 - 6.2 M/CU MM    Hemoglobin 14.1 13.5 - 18.0 GM/DL    Hematocrit 45.1 42 - 52 %    MCV 85.4 78 - 100 FL    MCH 26.7 (L) 27 - 31 PG    MCHC 31.3 (L) 32.0 - 36.0 %    RDW 13.4 11.7 - 14.9 %    Platelets 688 920 - 178 K/CU MM    MPV 9.0 7.5 - 11.1 FL   Homocysteine, Serum    Collection Time: 02/21/21  3:23 AM   Result Value Ref Range    Homocysteine 8.8 0 - 10 umol/L   Vitamin B12 & Folate    Collection Time: 02/21/21  3:23 AM   Result Value Ref Range    Vitamin B-12 385.5 211 - 911 pg/ml    Folate 11.2 3.1 - 17.5 NG/ML   TSH without Reflex    Collection Time: 02/21/21  3:23 AM   Result Value Ref Range    TSH, High Sensitivity 1.910 0.270 - 4.20 uIu/ml   POCT Glucose    Collection Time: 02/21/21  6:47 AM   Result Value Ref Range    POC Glucose 146 (H) 70 - 99 MG/DL   POCT Glucose    Collection Time: 02/21/21 11:11 AM   Result Value Ref Range    POC Glucose 101 (H) 70 - 99 MG/DL   POCT Glucose    Collection Time: 02/21/21  4:26 PM   Result Value Ref Range    POC Glucose 96 70 - 99 MG/DL   POCT Glucose    Collection Time: 02/21/21  8:09 PM   Result Value Ref Range    POC Glucose 275 (H) 70 - 99 MG/DL         Medical problems    Patient Active Problem List:     Hypertension     Type 2 diabetes mellitus ([facility] [address]     Hyperlipidemia     PSVT 6/2 2020 and 1/20/21     ASCVD (arteriosclerotic cardiovascular disease)     Hemorrhoids     Stroke-like symptoms      ASSESSMENT:  ---------------------    TIA r/o cardio carotid embolic event     DM     HTN     PLAN:     CT brain Mri brain neg for acute cva     CTA head neck neg     Echo     B 12 folate TSH homocysteine level     Plavix asa     Discussed dx prognosis meds side effects and above with pt and answered all questions.           Electronically signed by Miles Tineo MD on 2/21/2021 at 10:07 PM

## 2021-02-23 VITALS
HEART RATE: 66 BPM | TEMPERATURE: 98.1 F | SYSTOLIC BLOOD PRESSURE: 132 MMHG | OXYGEN SATURATION: 96 % | BODY MASS INDEX: 24.62 KG/M2 | HEIGHT: 75 IN | RESPIRATION RATE: 12 BRPM | DIASTOLIC BLOOD PRESSURE: 74 MMHG | WEIGHT: 198 LBS

## 2021-02-23 LAB
GLUCOSE BLD-MCNC: 134 MG/DL (ref 70–99)
GLUCOSE BLD-MCNC: 146 MG/DL (ref 70–99)

## 2021-02-23 PROCEDURE — 93308 TTE F-UP OR LMTD: CPT

## 2021-02-23 PROCEDURE — G0378 HOSPITAL OBSERVATION PER HR: HCPCS

## 2021-02-23 PROCEDURE — 6370000000 HC RX 637 (ALT 250 FOR IP): Performed by: NURSE PRACTITIONER

## 2021-02-23 PROCEDURE — 6370000000 HC RX 637 (ALT 250 FOR IP): Performed by: PSYCHIATRY & NEUROLOGY

## 2021-02-23 PROCEDURE — 94761 N-INVAS EAR/PLS OXIMETRY MLT: CPT

## 2021-02-23 PROCEDURE — 2580000003 HC RX 258: Performed by: NURSE PRACTITIONER

## 2021-02-23 PROCEDURE — 6360000002 HC RX W HCPCS: Performed by: NURSE PRACTITIONER

## 2021-02-23 PROCEDURE — 96372 THER/PROPH/DIAG INJ SC/IM: CPT

## 2021-02-23 PROCEDURE — 99214 OFFICE O/P EST MOD 30 MIN: CPT | Performed by: INTERNAL MEDICINE

## 2021-02-23 PROCEDURE — 82962 GLUCOSE BLOOD TEST: CPT

## 2021-02-23 RX ORDER — DILTIAZEM HYDROCHLORIDE 120 MG/1
120 CAPSULE, COATED, EXTENDED RELEASE ORAL DAILY
Qty: 30 CAPSULE | Refills: 3 | Status: SHIPPED | OUTPATIENT
Start: 2021-02-24 | End: 2021-11-29

## 2021-02-23 RX ORDER — MECLIZINE HYDROCHLORIDE 25 MG/1
25 TABLET ORAL 3 TIMES DAILY PRN
Qty: 30 TABLET | Refills: 0 | Status: SHIPPED | OUTPATIENT
Start: 2021-02-23 | End: 2021-03-05

## 2021-02-23 RX ORDER — ASPIRIN 81 MG/1
81 TABLET ORAL DAILY
Qty: 30 TABLET | Refills: 3 | Status: SHIPPED | OUTPATIENT
Start: 2021-02-24 | End: 2021-11-29 | Stop reason: SDUPTHER

## 2021-02-23 RX ORDER — ATORVASTATIN CALCIUM 20 MG/1
20 TABLET, FILM COATED ORAL DAILY
Qty: 90 TABLET | Refills: 1 | Status: SHIPPED | OUTPATIENT
Start: 2021-02-23 | End: 2022-02-04 | Stop reason: SDUPTHER

## 2021-02-23 RX ADMIN — SODIUM CHLORIDE, PRESERVATIVE FREE 10 ML: 5 INJECTION INTRAVENOUS at 08:30

## 2021-02-23 RX ADMIN — DILTIAZEM HYDROCHLORIDE 120 MG: 120 CAPSULE, COATED, EXTENDED RELEASE ORAL at 08:30

## 2021-02-23 RX ADMIN — ENOXAPARIN SODIUM 40 MG: 40 INJECTION SUBCUTANEOUS at 08:31

## 2021-02-23 RX ADMIN — ASPIRIN 81 MG: 81 TABLET, FILM COATED ORAL at 08:30

## 2021-02-23 RX ADMIN — INSULIN LISPRO 2 UNITS: 100 INJECTION, SOLUTION INTRAVENOUS; SUBCUTANEOUS at 11:34

## 2021-02-23 RX ADMIN — CLOPIDOGREL BISULFATE 75 MG: 75 TABLET ORAL at 08:30

## 2021-02-23 ASSESSMENT — PAIN SCALES - GENERAL: PAINLEVEL_OUTOF10: 0

## 2021-02-23 NOTE — PLAN OF CARE
Problem: Falls - Risk of:  Goal: Will remain free from falls  Description: Will remain free from falls  2/23/2021 0924 by Sharri Barajas LPN  Outcome: Ongoing     Problem: Falls - Risk of:  Goal: Absence of physical injury  Description: Absence of physical injury  2/23/2021 2061 by Sharri Barajas LPN  Outcome: Ongoing     Problem: ACTIVITY INTOLERANCE/IMPAIRED MOBILITY  Goal: Mobility/activity is maintained at optimum level for patient  2/23/2021 9754 by Sharri Barajas LPN  Outcome: Ongoing     Problem: Mobility - Impaired:  Goal: Mobility will improve  Description: Mobility will improve  2/23/2021 0924 by Sharri Barajas LPN  Outcome: Ongoing

## 2021-02-23 NOTE — PROGRESS NOTES
Physical Therapy   Triage d/c order, patient ambulating x300ft SBA with nursing in hallway. No acute therapy needs identified.

## 2021-02-23 NOTE — PLAN OF CARE
Problem: Falls - Risk of:  Goal: Will remain free from falls  Description: Will remain free from falls  2/23/2021 0130 by Gamaliel Chambers LPN  Outcome: Ongoing  2/22/2021 1254 by Fátima Roman LPN  Outcome: Ongoing  Goal: Absence of physical injury  Description: Absence of physical injury  2/23/2021 0130 by Gamaliel Chambers LPN  Outcome: Ongoing  2/22/2021 1254 by Fátima Roman LPN  Outcome: Ongoing     Problem: ACTIVITY INTOLERANCE/IMPAIRED MOBILITY  Goal: Mobility/activity is maintained at optimum level for patient  2/23/2021 0130 by Gamaliel Chambers LPN  Outcome: Ongoing  2/22/2021 1254 by Fátima Roman LPN  Outcome: Ongoing     Problem: Mobility - Impaired:  Goal: Mobility will improve  Description: Mobility will improve  2/23/2021 0130 by Gamaliel Chambers LPN  Outcome: Ongoing  2/22/2021 1254 by Fátima Roman LPN  Outcome: Ongoing

## 2021-02-23 NOTE — PROGRESS NOTES
Cardiology Progress Note     Today's Plan: 30 day event monitor at discharge. Echo pending. Pt can be d/c after Echocardiogram     Admit Date:  2/19/2021    Consult reason/ Seen today for: dizziness    Subjective and  Overnight Events:  Patient reports improved dizziness, denies any chest pain or shortness of breath. Assessment / Plan / Recommendation:     1. History of paroxysmal supraventricular tachycardia: Patient evaluated by Dr. Calli Mcgraw in recent past.  Offered EP study but declined. I discussed the pros and cons of EP study/ablation with the patient again. Patient would like to discuss options with his primary care physician first, before considering ablation. Recommend 30-day event monitor upon discharge. There is no new documentation of SVT since last episode in January. Continue with Cardizem. 2. Dizziness: Unclear etiology, from history does not appear to be strokelike symptoms. Neurology following. CT scan and MRI are negative for acute stroke. Echocardiogram is ordered which we will review. 3. Essential hypertension: stable on Cardizem 120 mg, no BB due to bradycardia. 4. Hyperlipidemia: Recommend to restart home dose of Lipitor 20 mg daily. 5. DVT prophylaxis if not contraindicated. History of Presenting Illness:    Chief complain on admission : 72 y. o.year old who is admitted for  Chief Complaint   Patient presents with    Dizziness        Past medical history:    has a past medical history of H/O echocardiogram, History of exercise stress test, Hyperlipidemia, Hypertension, PSVT (paroxysmal supraventricular tachycardia) ([address] and Type 2 diabetes mellitus ([address] Past surgical history:   has no past surgical history on file. Social History:   reports that he has never smoked. He has never used smokeless tobacco. He reports previous alcohol use. He reports that he does not use drugs.   Family history:  family history includes Cancer in his father; Diabetes in his mother and sister; Heart Disease in his mother; High Blood Pressure in his sister; High Cholesterol in his mother and sister. No Known Allergies    Review of Systems:  Review of Systems   All other systems reviewed and are negative. /82   Pulse 76   Temp 98.1 °F (36.7 °C) (Oral)   Resp 21   Ht 6' 3\" (1.905 m)   Wt 198 lb (89.8 kg)   SpO2 97%   BMI 24.75 kg/m²       Intake/Output Summary (Last 24 hours) at 2/23/2021 1066  Last data filed at 2/22/2021 2027  Gross per 24 hour   Intake 10 ml   Output --   Net 10 ml       Physical Exam:  Constitutional:  Well developed, Well nourished, No acute distress  HENT:  Normocephalic, Atraumatic, Bilateral external ears normal,  Nose normal.   Neck- trachea is midline  Eyes:  PERRL, Conjunctiva normal  Respiratory:  Normal breath sounds, No respiratory distress, No wheezing, No chest tenderness. Cardiovascular:  Normal heart rate, Normal rhythm, no murmurs appreciated, No rubs appreciated, No gallops appreciated, JVP not elevated  Abdomen/GI:  Soft, No tenderness  Musculoskeletal:  Intact distal pulses, no edema, No tenderness, No cyanosis, No clubbing. Integument:  Warm, Dry  Lymphatic:  No lymphadenopathy noted.    Neurologic:  Alert & oriented  Psychiatric:  Affect and Mood :pleasant     Medications:    clopidogrel  75 mg Oral Daily    sodium chloride flush  10 mL Intravenous 2 times per day    enoxaparin  40 mg Subcutaneous Daily    aspirin  81 mg Oral Daily    Or    aspirin  300 mg Rectal Daily    atorvastatin  80 mg Oral Nightly    insulin lispro  0-12 Units Subcutaneous TID     insulin lispro  0-6 Units Subcutaneous Nightly    dilTIAZem  120 mg Oral Daily      dextrose       meclizine, sodium chloride flush, promethazine **OR** ondansetron, polyethylene glycol, labetalol, glucose, dextrose, glucagon (rDNA), dextrose    Lab Data:  CBC:   Recent Labs 02/21/21  0323   WBC 5.7   HGB 14.1   HCT 45.1   MCV 85.4        BMP:   Recent Labs     02/21/21  0323      K 4.3      CO2 26   PHOS 2.5   BUN 15   CREATININE 1.1     PT/INR:   No results for input(s): PROTIME, INR in the last 72 hours. BNP:  No results for input(s): PROBNP in the last 72 hours. TROPONIN:   No results for input(s): TROPONINT in the last 72 hours. ECHO : 10/6/20  Left ventricular function is normal, EF is estimated at 55-60%. Mild left ventricular hypertrophy with Grade I diastolic dysfunction. No regional wall motion abnormalities were detected. Mild tricuspid regurgitation. No significant valvular disease noted. No evidence of pericardial effusion. Impression:  Active Problems:    Stroke-like symptoms    Dizziness and giddiness  Resolved Problems:    * No resolved hospital problems. *       All labs, medications and tests reviewed by myself, continue all other medications of all above medical condition listed as is except for changes mentioned above. Thank you   Please call with questions.     Electronically signed by Maurizio Etienne MD on 2/23/2021 at 8:03 AM

## 2021-02-23 NOTE — DISCHARGE SUMMARY
Discharge Summary    Name:  Harsh Odell /Age/Sex: 1950  (15 y.o. male)   MRN & CSN:  [id number] & [id number] Admission Date/Time: 2021  2:38 PM   Attending:  Saritha Navarrete MD Discharging Physician: Paz Martin MD     Hospital Course:   Harsh Odell is a 72 y.o.  male  who presents with Stroke-like symptoms    #Stroke-like symptoms-presents with dizziness.  -Denies any dizziness or lateralizing weakness. -MRI : No acute stroke  -CTA head and neck: No acute infarct  -Echo pending  -Neurology consulted   -ASA/Statin on medication regimen  -cardiology on board: Recommend outpatient 30 day event monitor                 #Essential hypertension- Monitor BP trends.  -Resume home blood pressure medication  -PRN labetalol-permissive hypertension parameters     #DMII with chronic complications and without long term use of insulin. -Monitor FSBS and cover with medium dose SSI  -Resume home meds on discharge. The patient expressed appropriate understanding of and agreement with the discharge recommendations, medications, and plan.      Consults this admission:  IP CONSULT TO PHARMACY  PHARMACY TO CHANGE BASE FLUIDS  IP CONSULT TO HOSPITALIST  IP CONSULT TO NEUROLOGY  IP CONSULT TO CARDIOLOGY    Discharge Instruction:   Follow up appointments: cardiology and neurology  Primary care physician:  within 2 weeks    Diet:  cardiac diet, diabetic diet and low fat, low cholesterol diet   Activity: activity as tolerated  Disposition: Discharged to:   [x]Home, []C, []SNF, []Acute Rehab, []Hospice   Condition on discharge: Stable    Discharge Medications:      Marge Cotton   Home Medication Instructions PIR:[id number]    Printed on:21 1046   Medication Information                      aspirin 81 MG EC tablet  Take 1 tablet by mouth daily             atorvastatin (LIPITOR) 20 MG tablet  Take 1 tablet by mouth daily             Blood Glucose Monitoring Suppl (FREESTYLE LITE) MELODY  1 Device by Does not apply route daily             blood glucose test strips (FREESTYLE LITE) strip  1 each by In Vitro route daily As needed. dilTIAZem (CARDIZEM CD) 120 MG extended release capsule  Take 1 capsule by mouth daily             docusate sodium (COLACE) 100 MG capsule  Take 1 capsule by mouth 2 times daily             Dulaglutide (TRULICITY) 6.71 KP/2.9XO SOPN  inject 0.5 milliliters ( 0.75 milligrams ) subcutaneously every 7 days             Glucose Blood (ONETOUCH VERIO VI)  by In Vitro route             ibuprofen (ADVIL;MOTRIN) 800 MG tablet  Take 1 tablet by mouth every 6 hours as needed for Pain             lisinopril (PRINIVIL;ZESTRIL) 10 MG tablet  take 1 tablet by mouth once daily             meclizine (ANTIVERT) 25 MG tablet  Take 1 tablet by mouth 3 times daily as needed for Dizziness             metFORMIN (GLUCOPHAGE-XR) 500 MG extended release tablet  take 2 tablets by mouth once daily             ONETOUCH DELICA LANCETS FINE MISC  by Does not apply route             ONETOUCH VERIO strip  TEST TWICE DAILY AND AS NEEDED             polyethylene glycol (MIRALAX) 17 GM/SCOOP powder  Take 17 g by mouth daily                 Objective Findings at Discharge:   /74   Pulse 66   Temp 98.1 °F (36.7 °C) (Oral)   Resp 12   Ht 6' 3\" (1.905 m)   Wt 198 lb (89.8 kg)   SpO2 96%   BMI 24.75 kg/m²            PHYSICAL EXAM  GEN Awake male, sitting upright in bed in no apparent distress. Appears given age. EYES Pupils are equally round. No scleral erythema, discharge, or conjunctivitis. HENT Mucous membranes are moist. Oral pharynx without exudates, no evidence of thrush. NECK Supple, no apparent thyromegaly or masses. RESP Clear to auscultation, no wheezes, rales or rhonchi. Symmetric chest movement while on room air. CARDIO/VASC S1/S2 auscultated. Regular rate without appreciable murmurs, rubs, or gallops. No JVD or carotid bruits. Peripheral pulses equal bilaterally and palpable.  No peripheral edema. GI Abdomen is soft without significant tenderness, masses, or guarding. Bowel sounds are normoactive. Rectal exam deferred.  No costovertebral angle tenderness. Normal appearing external genitalia. Rios catheter is not present. HEME/LYMPH No palpable cervical lymphadenopathy and no hepatosplenomegaly. No petechiae or ecchymoses. MSK No gross joint deformities. SKIN Normal coloration, warm, dry. NEURO Cranial nerves appear grossly intact, normal speech, no lateralizing weakness. PSYCH Awake, alert, oriented x 4. Affect appropriate.     BMP/CBC  Recent Labs     02/21/21  0323      K 4.3      CO2 26   BUN 15   CREATININE 1.1   WBC 5.7   HCT 45.1          IMAGING:    Discharge Time of 28 minutes    Electronically signed by Ria Vazquez MD on 2/23/2021 at 10:49 AM

## 2021-02-23 NOTE — PROGRESS NOTES
NEUROLOGY NOTE  DR. Tres Mckay MD.  -------------------------------------------------  Subjective:    Doing better. Denies any new symptoms. Denies headache nausea vomiting dizziness    Denies numbness weakness extremities    Denies blurring of vision double vision    Objective:    /83   Pulse 65   Temp 98.2 °F (36.8 °C) (Oral)   Resp 16   Ht 6' 3\" (1.905 m)   Wt 198 lb (89.8 kg)   SpO2 97%   BMI 24.75 kg/m²   HEENT nl      Neuro exam    Alert Oriented  X 3  Follow simple commands  EOMI Pupils 3 mm zeny  5/5 all 4 extremities      RADIOLOGY  -----------------    Ct Head Wo Contrast    Result Date: 2/19/2021  EXAMINATION: CT OF THE HEAD WITHOUT CONTRAST  2/19/2021 3:00 pm TECHNIQUE: CT of the head was performed without the administration of intravenous contrast. Dose modulation, iterative reconstruction, and/or weight based adjustment of the mA/kV was utilized to reduce the radiation dose to as low as reasonably achievable. COMPARISON: None. HISTORY: ORDERING SYSTEM PROVIDED HISTORY: dizziness TECHNOLOGIST PROVIDED HISTORY: Has a \"code stroke\" or \"stroke alert\" been called? ->Yes Reason for exam:->dizziness Reason for Exam: AMS/ dizziness/ nausea Acuity: Acute Type of Exam: Initial FINDINGS: BRAIN/VENTRICLES: There is no acute intracranial hemorrhage, mass effect or midline shift. No abnormal extra-axial fluid collection. The gray-white differentiation is maintained without evidence of an acute infarct. There is no evidence of hydrocephalus. ORBITS: The visualized portion of the orbits demonstrate no acute abnormality. SINUSES: The visualized paranasal sinuses and mastoid air cells demonstrate no acute abnormality. SOFT TISSUES/SKULL:  No acute abnormality of the visualized skull or soft tissues. No acute intracranial abnormality. Critical results were called by Dr. Fernando Newton.  Marianne Moss MD to [address] [facility] emergency department on 2/19/2021 at 15:08. Xr Chest Portable    Result Date: 2/19/2021  EXAMINATION: ONE XRAY VIEW OF THE CHEST 2/19/2021 3:14 pm COMPARISON: None. HISTORY: ORDERING SYSTEM PROVIDED HISTORY: dizziness TECHNOLOGIST PROVIDED HISTORY: Reason for exam:->dizziness Reason for Exam: dizziness Acuity: Acute Type of Exam: Initial Additional signs and symptoms: unknown Relevant Medical/Surgical History: none FINDINGS: The lungs are without acute focal process. There is no effusion or pneumothorax. The cardiomediastinal silhouette is without acute process. The osseous structures are without acute process. No acute process. Cta Head Neck W Contrast    Result Date: 2/19/2021  EXAMINATION: CTA OF THE HEAD AND NECK WITH CONTRAST 2/19/2021 3:17 pm: TECHNIQUE: CTA of the head and neck was performed with the administration of intravenous contrast. Multiplanar reformatted images are provided for review. MIP images are provided for review. Stenosis of the internal carotid arteries measured using NASCET criteria. Dose modulation, iterative reconstruction, and/or weight based adjustment of the mA/kV was utilized to reduce the radiation dose to as low as reasonably achievable. COMPARISON: None. HISTORY: ORDERING SYSTEM PROVIDED HISTORY: stroke alert dizziness TECHNOLOGIST PROVIDED HISTORY: Reason for exam:->stroke alert dizziness Decision Support Exception->Emergency Medical Condition (MA) Reason for Exam: stroke alert, dizziness/nausea Acuity: Acute Type of Exam: Initial Additional signs and symptoms: 80ml Isovue 370 Relevant Medical/Surgical History: hx HTN FINDINGS: CTA NECK: AORTIC ARCH/ARCH VESSELS: No dissection or arterial injury. No significant stenosis of the brachiocephalic or subclavian arteries. CAROTID ARTERIES: No dissection, arterial injury, or hemodynamically significant stenosis by NASCET criteria. VERTEBRAL ARTERIES: No dissection, arterial injury, or significant stenosis. SOFT TISSUES: The lung apices are clear.   No cervical or superior mediastinal lymphadenopathy. The larynx and pharynx are unremarkable. No acute abnormality of the salivary and thyroid glands. There is mucosal thickening of the paranasal sinuses with near complete opacification of the right maxillary antrum. BONES: There are degenerative changes of the spine and numerous dental caries are present. CTA HEAD: ANTERIOR CIRCULATION: No significant stenosis of the intracranial internal carotid, anterior cerebral, or middle cerebral arteries. No aneurysm. POSTERIOR CIRCULATION: No significant stenosis of the vertebral, basilar, or posterior cerebral arteries. No aneurysm. OTHER: No dural venous sinus thrombosis on this non-dedicated study. BRAIN: See separately dictated noncontrast head CT report. No flow limiting stenosis or large vessel occlusion visualized within the head or neck. Paranasal sinus inflammatory mucosal disease with near complete opacification of the right maxillary sinus. Severe dental disease. Mri Brain Without Contrast    Result Date: 2/20/2021  EXAMINATION: MRI OF THE BRAIN WITHOUT CONTRAST  2/20/2021 11:10 am TECHNIQUE: Multiplanar multisequence MRI of the brain was performed without the administration of intravenous contrast. COMPARISON: CT angiogram brain 02/19/2021 HISTORY: ORDERING SYSTEM PROVIDED HISTORY: Stroke evaluation TECHNOLOGIST PROVIDED HISTORY: Reason for exam:->Stroke evaluation Decision Support Exception->Emergency Medical Condition (MA) Reason for Exam: pt having dizziness and nausea FINDINGS: INTRACRANIAL STRUCTURES/VENTRICLES: There are no areas of restricted diffusion identified to suggest an acute infarct. There is no acute intracranial hemorrhage. No mass effect or midline shift is present. There are several foci of increased T2/FLAIR signal intensity within the periventricular and deep white matter. There is no sellar or suprasellar mass present.   There is no ventriculomegaly or abnormal extra-axial fluid collection present. The proximal portions of the [place] demonstrate normal flow voids. ORBITS: Limited evaluation of the orbits is unremarkable. SINUSES: There is complete opacification of the right maxillary sinus. Mucosal thickening is present throughout the paranasal sinuses. There is trace fluid within the right mastoid air cells. The left mastoid air cells are clear. No fluid levels are identified. BONES/SOFT TISSUES: Bone marrow signal intensity is normal.     1. No acute infarct or acute intracranial process identified. 2. Mild chronic small vessel ischemic changes. 3. Chronic right maxillary sinusitis.        LAB RESULTS  --------------------    Recent Results (from the past 24 hour(s))   POCT Glucose    Collection Time: 02/22/21  7:30 AM   Result Value Ref Range    POC Glucose 170 (H) 70 - 99 MG/DL   POCT Glucose    Collection Time: 02/22/21 11:24 AM   Result Value Ref Range    POC Glucose 119 (H) 70 - 99 MG/DL   POCT Glucose    Collection Time: 02/22/21  4:17 PM   Result Value Ref Range    POC Glucose 142 (H) 70 - 99 MG/DL   POCT Glucose    Collection Time: 02/22/21  8:27 PM   Result Value Ref Range    POC Glucose 130 (H) 70 - 99 MG/DL         Medical problems    Patient Active Problem List:     Hypertension     Type 2 diabetes mellitus ([facility] [address]     Hyperlipidemia     PSVT 6/2 2020 and 1/20/21     ASCVD (arteriosclerotic cardiovascular disease)     Hemorrhoids     Stroke-like symptoms      ASSESSMENT:  ---------------------    TIA r/o cardio carotid embolic event     DM     HTN     PLAN:     CT brain Mri brain neg for acute cva     CTA head neck neg     Echo pend     B 12 folate TSH homocysteine level nl     Plavix asa     Discussed dx prognosis meds side effects and above with pt and answered all questions.           Electronically signed by Valerie Yang MD on 2/22/2021 at 11:56 PM

## 2021-02-24 LAB
EKG ATRIAL RATE: 88 BPM
EKG DIAGNOSIS: NORMAL
EKG P AXIS: 52 DEGREES
EKG P-R INTERVAL: 166 MS
EKG Q-T INTERVAL: 372 MS
EKG QRS DURATION: 98 MS
EKG QTC CALCULATION (BAZETT): 450 MS
EKG R AXIS: 30 DEGREES
EKG T AXIS: 12 DEGREES
EKG VENTRICULAR RATE: 88 BPM

## 2021-02-25 ASSESSMENT — ENCOUNTER SYMPTOMS
BLOOD IN STOOL: 0
NAUSEA: 0
PHOTOPHOBIA: 0
SHORTNESS OF BREATH: 0
CONSTIPATION: 0
COUGH: 0
CHEST TIGHTNESS: 0
COLOR CHANGE: 0
EYE PAIN: 0
DIARRHEA: 0
BACK PAIN: 0
WHEEZING: 0
VOMITING: 0
ABDOMINAL PAIN: 0

## 2021-03-01 ENCOUNTER — OFFICE VISIT (OUTPATIENT)
Dept: FAMILY MEDICINE CLINIC | Age: 71
End: 2021-03-01
Payer: COMMERCIAL

## 2021-03-01 VITALS
BODY MASS INDEX: 25.86 KG/M2 | TEMPERATURE: 98.8 F | WEIGHT: 208 LBS | DIASTOLIC BLOOD PRESSURE: 80 MMHG | HEIGHT: 75 IN | OXYGEN SATURATION: 98 % | HEART RATE: 86 BPM | SYSTOLIC BLOOD PRESSURE: 130 MMHG

## 2021-03-01 DIAGNOSIS — R42 VERTIGO: Primary | ICD-10-CM

## 2021-03-01 DIAGNOSIS — I47.1 PSVT (PAROXYSMAL SUPRAVENTRICULAR TACHYCARDIA) (HCC): ICD-10-CM

## 2021-03-01 DIAGNOSIS — E11.00 TYPE 2 DIABETES MELLITUS WITH HYPEROSMOLARITY WITHOUT COMA, WITHOUT LONG-TERM CURRENT USE OF INSULIN (HCC): ICD-10-CM

## 2021-03-01 PROCEDURE — 99214 OFFICE O/P EST MOD 30 MIN: CPT | Performed by: FAMILY MEDICINE

## 2021-03-01 PROCEDURE — 1123F ACP DISCUSS/DSCN MKR DOCD: CPT | Performed by: FAMILY MEDICINE

## 2021-03-01 PROCEDURE — G8484 FLU IMMUNIZE NO ADMIN: HCPCS | Performed by: FAMILY MEDICINE

## 2021-03-01 PROCEDURE — 3051F HG A1C>EQUAL 7.0%<8.0%: CPT | Performed by: FAMILY MEDICINE

## 2021-03-01 PROCEDURE — 4040F PNEUMOC VAC/ADMIN/RCVD: CPT | Performed by: FAMILY MEDICINE

## 2021-03-01 PROCEDURE — 3017F COLORECTAL CA SCREEN DOC REV: CPT | Performed by: FAMILY MEDICINE

## 2021-03-01 PROCEDURE — 1036F TOBACCO NON-USER: CPT | Performed by: FAMILY MEDICINE

## 2021-03-01 PROCEDURE — G8417 CALC BMI ABV UP PARAM F/U: HCPCS | Performed by: FAMILY MEDICINE

## 2021-03-01 PROCEDURE — G8427 DOCREV CUR MEDS BY ELIG CLIN: HCPCS | Performed by: FAMILY MEDICINE

## 2021-03-01 PROCEDURE — 2022F DILAT RTA XM EVC RTNOPTHY: CPT | Performed by: FAMILY MEDICINE

## 2021-03-01 RX ORDER — METHYLPREDNISOLONE ACETATE 40 MG/ML
40 INJECTION, SUSPENSION INTRA-ARTICULAR; INTRALESIONAL; INTRAMUSCULAR; SOFT TISSUE ONCE
Status: DISCONTINUED | OUTPATIENT
Start: 2021-03-01 | End: 2021-03-01

## 2021-03-01 NOTE — PROGRESS NOTES
3/1/2021     Cathryne Sandhoff      Chief Complaint   Patient presents with    Follow-Up from [facility] 21-21 DX: Vertigo    Dizziness     Dx Vertigo, he had went for dizziness       HPI      Leighton Beth is a 72 y.o. male who presents today with the followin. Vertigo    2. Type 2 diabetes mellitus with hyperosmolarity without coma, without long-term current use of insulin ([address]    3. PSVT 2020 and 21    Patient was hospitalized for an episode of dizziness  What he is describing sounds like vertigo  He had a cardiac work-up and he had neurologic consultation  Stroke was ruled out  He was given a prescription for meclizine  He still has mild symptoms still he has been off work since he was hospitalized    REVIEW OF SYMPTOMS    Review of Systems   Cardiovascular:        Patient has a history of SVT  He been on medication he had a second spell of this and he stopped taking Trulicity as he read that it could cause this and he has any further symptoms  His last A1c was 7.1 while he was on Trulicity   Neurological: Positive for dizziness. PAST MEDICAL HISTORY  Past Medical History:   Diagnosis Date    H/O echocardiogram 10/06/2020    EF55-60%, Mild TR & LVH.     History of exercise stress test 10/06/2020    Treadmill, Normal    Hyperlipidemia     Hypertension     PSVT (paroxysmal supraventricular tachycardia) ([address] 2020 [facility]    Type 2 diabetes mellitus ([address]        FAMILY HISTORY  Family History   Problem Relation Age of Onset    Diabetes Mother     High Cholesterol Mother     Heart Disease Mother     Cancer Father     Diabetes Sister     High Blood Pressure Sister     High Cholesterol Sister        SOCIAL HISTORY  Social History     Socioeconomic History    Marital status: Single     Spouse name: None    Number of children: None    Years of education: None    Highest education level: None   Occupational History    None   Social Needs    Financial resource strain: None    Food insecurity     Worry: None     Inability: None    Transportation needs     Medical: None     Non-medical: None   Tobacco Use    Smoking status: Never Smoker    Smokeless tobacco: Never Used   Substance and Sexual Activity    Alcohol use: Not Currently     Comment: 2-3 beers per year/caffeine 2 cups of coffee a week and 4 pops a week    Drug use: No    Sexual activity: None   Lifestyle    Physical activity     Days per week: None     Minutes per session: None    Stress: None   Relationships    Social connections     Talks on phone: None     Gets together: None     Attends Alevism service: None     Active member of club or organization: None     Attends meetings of clubs or organizations: None     Relationship status: None    Intimate partner violence     Fear of current or ex partner: None     Emotionally abused: None     Physically abused: None     Forced sexual activity: None   Other Topics Concern    None   Social History Narrative    None        SURGICAL HISTORY  History reviewed. No pertinent surgical history. CURRENT MEDICATIONS  Current Outpatient Medications   Medication Sig Dispense Refill    aspirin 81 MG EC tablet Take 1 tablet by mouth daily 30 tablet 3    meclizine (ANTIVERT) 25 MG tablet Take 1 tablet by mouth 3 times daily as needed for Dizziness 30 tablet 0    atorvastatin (LIPITOR) 20 MG tablet Take 1 tablet by mouth daily 90 tablet 1    dilTIAZem (CARDIZEM CD) 120 MG extended release capsule Take 1 capsule by mouth daily 30 capsule 3    polyethylene glycol (MIRALAX) 17 GM/SCOOP powder Take 17 g by mouth daily 510 g 3    ibuprofen (ADVIL;MOTRIN) 800 MG tablet Take 1 tablet by mouth every 6 hours as needed for Pain 120 tablet 0    Blood Glucose Monitoring Suppl (FREESTYLE LITE) MELODY 1 Device by Does not apply route daily 1 Device 0    blood glucose test strips (FREESTYLE LITE) strip 1 each by In Vitro route daily As needed.  100 each 3  ONETOUCH VERIO strip TEST TWICE DAILY AND AS NEEDED 100 strip 5    metFORMIN (GLUCOPHAGE-XR) 500 MG extended release tablet take 2 tablets by mouth once daily 180 tablet 1    lisinopril (PRINIVIL;ZESTRIL) 10 MG tablet take 1 tablet by mouth once daily 90 tablet 1    ONETOUCH DELICA LANCETS FINE MISC by Does not apply route      Glucose Blood (ONETOUCH VERIO VI) by In Vitro route      docusate sodium (COLACE) 100 MG capsule Take 1 capsule by mouth 2 times daily (Patient not taking: Reported on 3/1/2021) 60 capsule 2     No current facility-administered medications for this visit. ALLERGIES  No Known Allergies    PHYSICAL EXAM    /80 (Site: Left Upper Arm, Position: Sitting, Cuff Size: Medium Adult)   Pulse 86   Temp 98.8 °F (37.1 °C) (Temporal)   Ht 6' 3\" (1.905 m)   Wt 208 lb (94.3 kg)   SpO2 98%   BMI 26.00 kg/m²     Physical Exam  Vitals signs and nursing note reviewed. Constitutional:       Appearance: Normal appearance. Cardiovascular:      Rate and Rhythm: Normal rate. Pulmonary:      Effort: Pulmonary effort is normal.       I reviewed his hospital records  Reconciled his medications  Reviewed last A1c    ASSESSMENT and Jasmine Lou was seen today for follow-up from hospital and dizziness.     Diagnoses and all orders for this visit:    Vertigo    Type 2 diabetes mellitus with hyperosmolarity without coma, without long-term current use of insulin (HCC)    PSVT 6/2 2020 and 1/20/21    Other orders  -     Discontinue: methylPREDNISolone acetate (DEPO-MEDROL) injection 40 mg  -     Cancel: [place] ARTHROCENTESIS ASPIR&/INJ SMALL JT/BURSA W/O US    The above joint injection and Medrol is an error it was in the wrong chart   it has been reported that Trulicity can cause tachycardia  I go to go ahead and switch him from that to Januvia 100 mg daily  He is to check home blood sugars follow-up in 3 months we will get an A1c at that time  Authorized off work from [phone]  Return in about 3 months (around 6/1/2021). Electronically signed by Yaneth Oleary MD on 3/1/2021    Please note that this chart was generated using dragon dictation software. Although every effort was made to ensure the accuracy of this automated transcription, some errors in transcription may have occurred.

## 2021-03-01 NOTE — LETTER
[address] [place], [place] [address]   Phone: [phone]  Fax: [phone]    Juanita James MD        March 1, 2021     Patient: Katerin Mittal   YOB: 1950   Date of Visit: 3/1/2021       To Whom It May Concern: It is my medical opinion that Leobardo Brink off 2/20/21- 3/8/21. If you have any questions or concerns, please don't hesitate to call.     Sincerely,        Juanita James MD

## 2021-03-10 ENCOUNTER — TELEPHONE (OUTPATIENT)
Dept: FAMILY MEDICINE CLINIC | Age: 71
End: 2021-03-10

## 2021-03-18 ENCOUNTER — OFFICE VISIT (OUTPATIENT)
Dept: CARDIOLOGY CLINIC | Age: 71
End: 2021-03-18
Payer: MEDICARE

## 2021-03-18 VITALS
WEIGHT: 207.2 LBS | HEIGHT: 75 IN | HEART RATE: 92 BPM | BODY MASS INDEX: 25.76 KG/M2 | SYSTOLIC BLOOD PRESSURE: 134 MMHG | DIASTOLIC BLOOD PRESSURE: 78 MMHG

## 2021-03-18 DIAGNOSIS — E78.00 PURE HYPERCHOLESTEROLEMIA: ICD-10-CM

## 2021-03-18 DIAGNOSIS — Z01.818 PRE-OP EVALUATION: ICD-10-CM

## 2021-03-18 DIAGNOSIS — I47.1 PSVT (PAROXYSMAL SUPRAVENTRICULAR TACHYCARDIA) (HCC): Primary | ICD-10-CM

## 2021-03-18 DIAGNOSIS — I25.10 ASCVD (ARTERIOSCLEROTIC CARDIOVASCULAR DISEASE): ICD-10-CM

## 2021-03-18 DIAGNOSIS — R29.90 STROKE-LIKE SYMPTOMS: ICD-10-CM

## 2021-03-18 DIAGNOSIS — E11.00 TYPE 2 DIABETES MELLITUS WITH HYPEROSMOLARITY WITHOUT COMA, WITHOUT LONG-TERM CURRENT USE OF INSULIN (HCC): ICD-10-CM

## 2021-03-18 DIAGNOSIS — I10 ESSENTIAL HYPERTENSION: ICD-10-CM

## 2021-03-18 PROCEDURE — G8427 DOCREV CUR MEDS BY ELIG CLIN: HCPCS | Performed by: INTERNAL MEDICINE

## 2021-03-18 PROCEDURE — G8484 FLU IMMUNIZE NO ADMIN: HCPCS | Performed by: INTERNAL MEDICINE

## 2021-03-18 PROCEDURE — 99214 OFFICE O/P EST MOD 30 MIN: CPT | Performed by: INTERNAL MEDICINE

## 2021-03-18 PROCEDURE — G8417 CALC BMI ABV UP PARAM F/U: HCPCS | Performed by: INTERNAL MEDICINE

## 2021-03-18 PROCEDURE — 2022F DILAT RTA XM EVC RTNOPTHY: CPT | Performed by: INTERNAL MEDICINE

## 2021-03-18 PROCEDURE — 1123F ACP DISCUSS/DSCN MKR DOCD: CPT | Performed by: INTERNAL MEDICINE

## 2021-03-18 PROCEDURE — 3017F COLORECTAL CA SCREEN DOC REV: CPT | Performed by: INTERNAL MEDICINE

## 2021-03-18 PROCEDURE — 3051F HG A1C>EQUAL 7.0%<8.0%: CPT | Performed by: INTERNAL MEDICINE

## 2021-03-18 PROCEDURE — 4040F PNEUMOC VAC/ADMIN/RCVD: CPT | Performed by: INTERNAL MEDICINE

## 2021-03-18 PROCEDURE — 1036F TOBACCO NON-USER: CPT | Performed by: INTERNAL MEDICINE

## 2021-03-18 NOTE — ASSESSMENT & PLAN NOTE
Patient is considered low cardiac risk from any noncardiac procedures or surgeries. Has excellent exercise tolerance on a treadmill stress test last year and recent echocardiogram showed normal left ventricle systolic function. Recommend cardiac monitoring for any cardiac arrhythmias perioperatively. Continue diltiazem and lisinopril without interruption.

## 2021-03-18 NOTE — PATIENT INSTRUCTIONS
Please be informed that if you contact our office outside of normal business hours the physician on call cannot help with any scheduling or rescheduling issues, procedure instruction questions or any type of medication issue. We advise you for any urgent/emergency that you go to the nearest emergency room! PLEASE CALL OUR OFFICE DURING NORMAL BUSINESS HOURS    Monday - Friday   8 am to 5 pm    [place]: [address]: [phone]    [place]:  [phone]  Continue current cardiovascular medications which have been reviewed and discussed individually with you. Brittany Letters for colonoscopy or hemorrhoid surgery and considered low cardiac risk from surgery. need cardiac monitoring for any recurrence of arrhyhtmia. Follow-up in 12 months with EKG, sooner if needed.

## 2021-03-18 NOTE — ASSESSMENT & PLAN NOTE
Had not had much recurrence as last visit. He believes it could have been secondary to Trulicity and he will consider further options only if he has more recurrences now he is off of Trulicity.

## 2021-03-18 NOTE — PROGRESS NOTES
Ben Joseph (:  1950) is a 72 y.o. male,     Patient is here for follow-up from recent admission on  with dizziness and lightheadedness associated with nausea vomiting. [facility] records are reviewed and predominantly had a work-up for possible stroke which was negative and echocardiogram did not show any PFO or ASD or significant structural valvular abnormalities. He has a known history of PSVT for which he was seen by Dr. Calli Mcgraw recently and has not decided to proceed with ablation or try study medication. Patient has not had any recurrence of SVT since last office visit and he believed his SVT is started happening since he was started on Trulicity for his diabetes. He has been off of this medication and he would consider follow-up with EP or anything else for arrhythmia only if he has more recurrences. He is also preparing for colonoscopy and possible hemorrhoid surgery for which he has seen certified nurse practitioner and wanted to know if it is okay from cardiac standpoint. No Known Allergies  Prior to Admission medications    Medication Sig Start Date End Date Taking?  Authorizing Provider   SITagliptin (JANUVIA) 100 MG tablet Take 1 tablet by mouth daily 3/1/21  Yes Tyree Leavitt MD   aspirin 81 MG EC tablet Take 1 tablet by mouth daily 21   Estefania Leslie MD   atorvastatin (LIPITOR) 20 MG tablet Take 1 tablet by mouth daily 21   Estefania Leslie MD   dilTIAZem (CARDIZEM CD) 120 MG extended release capsule Take 1 capsule by mouth daily 21   Estefania Leslie MD   polyethylene glycol [facility]) 17 GM/SCOOP powder Take 17 g by mouth daily 2/15/21 6/15/21  Chandan Brooke APRN - CNP   ibuprofen (ADVIL;MOTRIN) 800 MG tablet Take 1 tablet by mouth every 6 hours as needed for Pain 21   Norm Nieves PA-C   Blood Glucose Monitoring Suppl (FREESTYLE LITE) MELODY 1 Device by Does not apply route daily 21   Tyree Leavitt MD   blood glucose test strips (FREESTYLE LITE) strip 1 each by In Vitro route daily As needed. 1/4/21   Viviane Lewis MD   [facility] VERIO strip TEST TWICE DAILY AND AS NEEDED 12/28/20   Viviane Lewis MD   metFORMIN (GLUCOPHAGE-XR) 500 MG extended release tablet take 2 tablets by mouth once daily 10/1/20   Viviane Lewis MD   lisinopril (PRINIVIL;ZESTRIL) 10 MG tablet take 1 tablet by mouth once daily 10/1/20   Viviane Lewis MD   [employer] [facility] [employer] LANCETS FINE MISC by Does not apply route    Historical Provider, MD   Glucose Blood (Enrigue Dill VI) by In Vitro route    Historical Provider, MD     Past Medical History:   Diagnosis Date    H/O echocardiogram 10/06/2020    EF55-60%, Mild TR & LVH.  History of exercise stress test 10/06/2020    Treadmill, Normal    Hyperlipidemia     Hypertension     PSVT (paroxysmal supraventricular tachycardia) ([facility] [address] 9/24/2020 9/2/2020 [facility]    Type 2 diabetes mellitus ([facility] [address]       Vitals:    03/18/21 0927   BP: 134/78   Pulse: 92   Weight: 207 lb 3.2 oz (94 kg)   Height: 6' 3\" (1.905 m)      Body mass index is 25.9 kg/m². Wt Readings from Last 3 Encounters:   03/18/21 207 lb 3.2 oz (94 kg)   03/01/21 208 lb (94.3 kg)   02/22/21 198 lb (89.8 kg)     Constitutional: normally built and nourished pleasant male in no apparent distress  Eyes: with frequent.  He wears glasses.  He is wearing a facemask  ENT: wearing face mask  NECK: No JVP or thyromegaly  Cardiovascular:  Auscultation: Normal S1 and S2.  No murmurs or gallops noted. Carotids are negative for bruits.  Abdominal aorta is is nonpalpable. no epigastric bruit noted. Peripheral pulses: pedal pulses are 2+ equal in both  Respiratory:  Respiratory effort is normal.  Breath sounds are .  Auscultation  Extremities:  No edema, clubbing,  Cyanosis, petechiae. SKIN: Warm and well perfused, no pallor or cyanosis  Abdomen:  No masses or tenderness. No organomegaly noted.   Musculoskeletal:  no spinal deformities noted.  Gait is normal.  Muscle strength is normal  Neurologic:  Oriented to time, place, and person and non-anxious. No focal neurological deficit noted. Psychiatric: Normal mood and effect. Pertinent records reviewed and discussed with patient and results are as follow:  Echocardiogram on 2/19/2021 reported   Left ventricular systolic function is normal.   Ejection fraction is visually estimated at 50-55%. Mild left ventricular hypertrophy. Grade I diastolic dysfunction. No evidence of any pericardial effusion. Negative bubble study: no PFO or ASD noted. Chest x-ray on 2/19/2021 reported  FINDINGS:   The lungs are without acute focal process. There is no effusion or   pneumothorax. The cardiomediastinal silhouette is without acute process. The   osseous structures are without acute process. Impression   No acute process. CTA head and neck on 2/19/2021 reported  No flow limiting stenosis or large vessel occlusion visualized within the head or neck. Paranasal sinus inflammatory mucosal disease with near complete opacification of the right maxillary sinus. Severe dental disease. MRI brain on 2/20/2021 reported  1. No acute infarct or acute intracranial process identified. 2. Mild chronic small vessel ischemic changes. 3. Chronic right maxillary sinusitis     Lab Results   Component Value Date    WBC 5.7 02/21/2021    HGB 14.1 02/21/2021    HCT 45.1 02/21/2021     02/21/2021     Lab Results   Component Value Date    CHOL 114 02/20/2021    TRIG 65 02/20/2021    HDL 44 02/20/2021    LDLCALC 50 09/13/2019    LDLDIRECT 62 02/20/2021     Lab Results   Component Value Date    BUN 15 02/21/2021    CREATININE 1.1 02/21/2021     02/21/2021    K 4.3 02/21/2021     Lab Results   Component Value Date    INR 0.89 02/19/2021     Lab Results   Component Value Date    LABA1C 7.1 (H) 02/20/2021     ASSESSMENT/PLAN:    1. PSVT 6/2 2020 and 1/20/21  Assessment & Plan:  Had not had much recurrence as last visit. He believes it could have been secondary to Trulicity and he will consider further options only if he has more recurrences now he is off of Trulicity. 2. ASCVD (arteriosclerotic cardiovascular disease)  Assessment & Plan:  Continue aggressive risk factor modification for primary prevention. 3. Pure hypercholesterolemia  Assessment & Plan:  Well controlled on current medications, reviewed individually with patient. 4. Essential hypertension  Assessment & Plan:  Well-controlled on current medications continue the same. 5. Type 2 diabetes mellitus with hyperosmolarity without coma, without long-term current use of insulin (HCC)  Assessment & Plan:  Well-controlled on current medications last hemoglobin A1c was 7.1.  6. Pre-op evaluation  Assessment & Plan:  Patient is considered low cardiac risk from any noncardiac procedures or surgeries. Has excellent exercise tolerance on a treadmill stress test last year and recent echocardiogram showed normal left ventricle systolic function. Recommend cardiac monitoring for any cardiac arrhythmias perioperatively. Continue diltiazem and lisinopril without interruption. 7. Stroke-like symptoms  Assessment & Plan:  Has follow-up with neurologist.    Continue current cardiovascular medications which have been reviewed and discussed individually with you. Norigail Sandersr for colonoscopy or hemorrhoid surgery and considered low cardiac risk from surgery. need cardiac monitoring for any recurrence of arrhyhtmia. Follow-up in 12 months with EKG, sooner if needed. An electronic signature was used to authenticate this note.     --Benancio Bence, MD

## 2021-03-18 NOTE — LETTER
[facility]    Dr. Myrna Pickett  1950  [id number]    Have you had any Chest Pain that is not new? - No  If Yes DO EKG - How does it feel -    How long does the pain last -      How long have you been having the pain -    Did you take a    And did it relieve the pain -      DO EKG IF: Patient has a Heart Rate above 100 or below 40     CAD (Coronary Artery Disease) patient should have one on file every 6 months        Have you had any Shortness of Breath - No  If Yes - When     Have you had any dizziness - No  If Yes DO ORTHOSTATIC BP - when do you feel dizzy    How long does it last .        Sitting wait 5 minutes do supine (laying down) wait 5 minutes then do standing - log each in \"vitals\" area in Epic   Be sure to ask what symptoms they are having if they get dizzy while completing ortho stats such as: room spinning, nausea, etc.    Have you had any palpitations that are not new? - No  If Yes DO EKG - Do you feel your heart   How long does it last - . Is the patient on any of the following medications -   If Yes DO EKG - Needs done every 6 months    Do you have any edema - swelling in No    If Yes - CHECK TO SEE IF THE EDEMA IS PITTING  How long have they been having edema -   If Yes - Have they worn compression stockings   If they have worn compression stockings      Vein \"LEG PROBLEM Questionnaire\"  1. Do you have prominent leg veins? 2. Do you have any skin discoloration? 3. Do you have any healed or active sores? 4. Do you have swelling of the legs? 5. Do you have a family history of varicose veins? 6. Does your profession involve pro-longed        standing or heavy lifting? 7. Have you been fighting overweight problems? 8. Do you have restless legs? 9. Do you have any night time cramps?     10. Do you have any of the following in your legs:             Do you have a surgery or procedure scheduled in the near future - Yes  If Yes- DO EKG  If Yes - Who is the surgery with?    Phone number of physician   Fax number of physician   Type of surgery colonscopy  GIVE THIS INFORMATION TO KAREN RUDOLPH     Ask patient if they want to sign up for MyChart if they are not already signed up     Check to see if we have an E-MAIL on file for the patient     Check medication list thoroughly!!! AND RECONCILE OUTSIDE MEDICATIONS  If dose has changed change the entire order not just the [place] At check out add to every patient's \"wrap up\" the following dot phrase AFTERHOURSEDUCATION and ensure we explain this to our patients

## 2021-04-02 ENCOUNTER — TELEPHONE (OUTPATIENT)
Dept: GASTROENTEROLOGY | Age: 71
End: 2021-04-02

## 2021-04-02 DIAGNOSIS — Z01.818 PRE-OP TESTING: Primary | ICD-10-CM

## 2021-04-02 NOTE — TELEPHONE ENCOUNTER
SPOKE [address] @ [facility].  NOTIFIED Nupur Fortune  PAT - Phone  COVID - 04/12/21  SURGERY - 04/19/21  P/O -     NPO AFTER MIDNIGHT  Suprep  HOLD BLOOD THINNERS - Do not hold 81 mg asaprin   SENT

## 2021-04-05 ENCOUNTER — PREP FOR PROCEDURE (OUTPATIENT)
Dept: GASTROENTEROLOGY | Age: 71
End: 2021-04-05

## 2021-04-05 RX ORDER — SODIUM CHLORIDE 9 MG/ML
25 INJECTION, SOLUTION INTRAVENOUS PRN
Status: CANCELLED | OUTPATIENT
Start: 2021-04-05

## 2021-04-05 RX ORDER — SODIUM CHLORIDE 0.9 % (FLUSH) 0.9 %
10 SYRINGE (ML) INJECTION PRN
Status: CANCELLED | OUTPATIENT
Start: 2021-04-05

## 2021-04-05 RX ORDER — SODIUM CHLORIDE 0.9 % (FLUSH) 0.9 %
10 SYRINGE (ML) INJECTION EVERY 12 HOURS SCHEDULED
Status: CANCELLED | OUTPATIENT
Start: 2021-04-05

## 2021-04-05 RX ORDER — SODIUM CHLORIDE, SODIUM LACTATE, POTASSIUM CHLORIDE, CALCIUM CHLORIDE 600; 310; 30; 20 MG/100ML; MG/100ML; MG/100ML; MG/100ML
INJECTION, SOLUTION INTRAVENOUS CONTINUOUS
Status: CANCELLED | OUTPATIENT
Start: 2021-04-05

## 2021-04-12 ENCOUNTER — NURSE ONLY (OUTPATIENT)
Dept: SURGERY | Age: 71
End: 2021-04-12
Payer: COMMERCIAL

## 2021-04-12 ENCOUNTER — HOSPITAL ENCOUNTER (OUTPATIENT)
Age: 71
Setting detail: SPECIMEN
Discharge: HOME | End: 2021-04-12
Payer: COMMERCIAL

## 2021-04-12 VITALS — HEART RATE: 85 BPM | DIASTOLIC BLOOD PRESSURE: 82 MMHG | TEMPERATURE: 98.1 F | SYSTOLIC BLOOD PRESSURE: 152 MMHG

## 2021-04-12 DIAGNOSIS — Z01.818 PRE-OP TESTING: Primary | ICD-10-CM

## 2021-04-12 PROCEDURE — U0005 INFEC AGEN DETEC AMPLI PROBE: HCPCS

## 2021-04-12 PROCEDURE — 99211 OFF/OP EST MAY X REQ PHY/QHP: CPT | Performed by: NURSE PRACTITIONER

## 2021-04-12 PROCEDURE — U0003 INFECTIOUS AGENT DETECTION BY NUCLEIC ACID (DNA OR RNA); SEVERE ACUTE RESPIRATORY SYNDROME CORONAVIRUS 2 (SARS-COV-2) (CORONAVIRUS DISEASE [COVID-19]), AMPLIFIED PROBE TECHNIQUE, MAKING USE OF HIGH THROUGHPUT TECHNOLOGIES AS DESCRIBED BY CMS-2020-01-R: HCPCS

## 2021-04-12 NOTE — PATIENT INSTRUCTIONS
Pre-Procedure COVID-19 Self Testing  Quarantine Instructions  Day of Surgery Instructions         What to do before my surgery:    All patients scheduled for elective surgery must test for COVID19 72-96 hours prior to the surgery date.  Pre-Procedure COVID-19 Self-Test will be scheduled for you by your provider.  You can receive your Pre-Procedure COVID-19 Self-Test at:  [facility]. [address] [facility], [facility], [facility], [address] [facility]   If you do not have the COVID-19 test we will cancel or reschedule your procedure   Once you test you must quarantine at home until after your procedure with only your immediate family members or whoever lives with you.  If you must work during your quarantine period, we ask that you continue to practice social distancing, wear a mask that covers your mouth and nose and perform all hand hygiene as recommended by the CDC.  If you must go to the grocery, etc. and cannot get someone to do this for you please wear a mask that covers your mouth and nose and perform all hand hygiene as recommended by the CDC.  Your surgeon's office will notify you with any concerns about your test result. What can I expect on the day of surgery?  Arrive at the time the office or hospital staff tell you on the day of your procedure.  Wear a mask when entering the hospital.     A member of the hospital staff will take your temperature and ask you a few questions as you enter the building.  In abundance of caution for the safety of all our patients and staff, please follow all hospital visitor guidelines in place at the time of your procedure. The staff caring for you will stay in close communication with your loved one and keep them updated on progress.  Please provide a phone number for us to use when communicating with your family or ride home.    When you are ready to discharge, we will notify your family/person with you to bring the car to the front entrance. We will take you to them after you receive all of your discharge instructions.

## 2021-04-14 LAB
SARS-COV-2: NOT DETECTED
SOURCE: NORMAL

## 2021-04-14 NOTE — PROGRESS NOTES
Left message on voicemail. Pt. will arrive at the Main entrance on 4/19/21. Pt. Will be notified on Friday 4/16/21 between 1-4pm of when procedure is scheduled and arrival time. Pt.informed that they may have one visitor come with them. The visitor must be free of covid symptoms. Both of them must wear a mask upon entering the hospital.  If they do not have a mask, one will be given to them at the . The masks must be worn the whole time they are in the building. The visitor must stay in the assigned room in Same Day Surgery. The visitor may not go to the vending machines, cafeteria, or walk around in the hospital. Pt. Will be NPO after MN tonight, including no gum, candy, or nicotine products. Pt. will take cardizem  with a tiny sip of water the morning of the procedure. If the patient uses inhalers or cpap, they will bring them with them to the hospital.  Pt. will shower with soap and water and will not use any lotions, creams, ointments on their skin. Pt. will wear no jewelry or metal. Covid-19 test was completed on 4/12/21  and results are pending. Informed Pt. Of need to  self-quarantine since their Covid-19 testing. Educated them if develops a cough, sore throat, fever, or any other unusual s/s that the physician should be made aware of before surgery. Encouraged to call back with further questions and/or concerns at this time. [facility] phone number was given should any further questions arise. [phone]. *This RN was able to reach patient at different phone number. Pre-op instructions discussed. He states has not picked up Prep yet but will call Dr. Jennifer Galvez office to do so. He is unsure of the medications he is taking but states will bring in a medication list as well. Encouraged him to call [facility] number for any questions or concerns.

## 2021-04-16 ENCOUNTER — ANESTHESIA EVENT (OUTPATIENT)
Dept: ENDOSCOPY | Age: 71
End: 2021-04-16
Payer: COMMERCIAL

## 2021-04-16 ENCOUNTER — TELEPHONE (OUTPATIENT)
Dept: GASTROENTEROLOGY | Age: 71
End: 2021-04-16

## 2021-04-16 RX ORDER — POLYETHYLENE GLYCOL 3350, SODIUM SULFATE ANHYDROUS, SODIUM BICARBONATE, SODIUM CHLORIDE, POTASSIUM CHLORIDE 236; 22.74; 6.74; 5.86; 2.97 G/4L; G/4L; G/4L; G/4L; G/4L
4 POWDER, FOR SOLUTION ORAL ONCE
Qty: 4000 ML | Refills: 0 | Status: SHIPPED | OUTPATIENT
Start: 2021-04-16 | End: 2021-04-16

## 2021-04-16 NOTE — ANESTHESIA PRE PROCEDURE
Department of Anesthesiology  Preprocedure Note       Name:  Alton Ayoub   Age:  72 y.o.  :  1950                                          MRN:  [id number]         Date:  2021      Surgeon: Eli Kirkpatrick):  Gina Hopson MD    Procedure: Procedure(s):  COLONOSCOPY DIAGNOSTIC    Medications prior to admission:   Prior to Admission medications    Medication Sig Start Date End Date Taking? Authorizing Provider   SITagliptin (JANUVIA) 100 MG tablet Take 1 tablet by mouth daily 3/1/21  Yes Moody Glover MD   aspirin 81 MG EC tablet Take 1 tablet by mouth daily 21  Yes Kathie Ayers MD   atorvastatin (LIPITOR) 20 MG tablet Take 1 tablet by mouth daily 21  Yes Kathie Ayers MD   polyethylene glycol [facility]) 17 GM/SCOOP powder Take 17 g by mouth daily 2/15/21 6/15/21 Yes SONNY Lake CNP   metFORMIN (GLUCOPHAGE-XR) 500 MG extended release tablet take 2 tablets by mouth once daily 10/1/20  Yes Moody Glover MD   lisinopril (PRINIVIL;ZESTRIL) 10 MG tablet take 1 tablet by mouth once daily 10/1/20  Yes Moody Glover MD   polyethylene glycol (GOLYTELY) 236 g solution Take 4,000 mLs by mouth once for 1 dose 21  SONNY Lake CNP   dilTIAZem (CARDIZEM CD) 120 MG extended release capsule Take 1 capsule by mouth daily 21   Kathie Ayers MD   ibuprofen (ADVIL;MOTRIN) 800 MG tablet Take 1 tablet by mouth every 6 hours as needed for Pain 21   Gio Carr PA-C   Blood Glucose Monitoring Suppl (FREESTYLE LITE) MELODY 1 Device by Does not apply route daily 21   Moody Glover MD   blood glucose test strips (FREESTYLE LITE) strip 1 each by In Vitro route daily As needed.  21   Moody Glover MD   [facility] VERIO strip TEST TWICE DAILY AND AS NEEDED 20   MD Bryan Monroy [place] LANCETS FINE MISC by Does not apply route    Historical Provider, MD   Glucose Blood (ONETOUCH VERIO VI) by In Vitro route Historical Provider, MD       Current medications:    No current facility-administered medications for this encounter. Current Outpatient Medications   Medication Sig Dispense Refill    SITagliptin (JANUVIA) 100 MG tablet Take 1 tablet by mouth daily 30 tablet 2    aspirin 81 MG EC tablet Take 1 tablet by mouth daily 30 tablet 3    atorvastatin (LIPITOR) 20 MG tablet Take 1 tablet by mouth daily 90 tablet 1    polyethylene glycol (MIRALAX) 17 GM/SCOOP powder Take 17 g by mouth daily 510 g 3    metFORMIN (GLUCOPHAGE-XR) 500 MG extended release tablet take 2 tablets by mouth once daily 180 tablet 1    lisinopril (PRINIVIL;ZESTRIL) 10 MG tablet take 1 tablet by mouth once daily 90 tablet 1    polyethylene glycol (GOLYTELY) 236 g solution Take 4,000 mLs by mouth once for 1 dose 4000 mL 0    dilTIAZem (CARDIZEM CD) 120 MG extended release capsule Take 1 capsule by mouth daily 30 capsule 3    ibuprofen (ADVIL;MOTRIN) 800 MG tablet Take 1 tablet by mouth every 6 hours as needed for Pain 120 tablet 0    Blood Glucose Monitoring Suppl (FREESTYLE LITE) MELODY 1 Device by Does not apply route daily 1 Device 0    blood glucose test strips (FREESTYLE LITE) strip 1 each by In Vitro route daily As needed. 100 each 3    ONETOUCH VERIO strip TEST TWICE DAILY AND AS NEEDED 100 strip 5    ONETOUCH DELICA LANCETS FINE MISC by Does not apply route      Glucose Blood (ONETOUCH VERIO VI) by In Vitro route         Allergies:  No Known Allergies    Problem List:    Patient Active Problem List   Diagnosis Code    Hypertension I10    Type 2 diabetes mellitus ([facility] [address] E11.9    Hyperlipidemia E78.5    PSVT 6/2 2020 and 1/20/21 I47.1    ASCVD (arteriosclerotic cardiovascular disease) I25.10    Hemorrhoids K64.9    Stroke-like symptoms R29.90    Dizziness and giddiness R42    Pre-op evaluation Z01.818       Past Medical History:        Diagnosis Date    H/O echocardiogram 10/06/2020    EF55-60%, Mild TR & LVH.     History of exercise stress test 10/06/2020    Treadmill, Normal    Hyperlipidemia     Hypertension     PSVT (paroxysmal supraventricular tachycardia) ([facility] [address] 9/24/2020 9/2/2020 [facility]    Type 2 diabetes mellitus [facility])        Past Surgical History:  History reviewed. No pertinent surgical history. Social History:    Social History     Tobacco Use    Smoking status: Never Smoker    Smokeless tobacco: Never Used   Substance Use Topics    Alcohol use: Not Currently     Comment: 2-3 beers per year/caffeine 2 cups of coffee a week and 4 pops a week                                Counseling given: Not Answered      Vital Signs (Current):   Vitals:    04/14/21 1257   Weight: 200 lb (90.7 kg)   Height: 6' 3\" (1.905 m)                                              BP Readings from Last 3 Encounters:   04/12/21 (!) 152/82   03/18/21 134/78   03/01/21 130/80       NPO Status:                                                                                 BMI:   Wt Readings from Last 3 Encounters:   03/18/21 207 lb 3.2 oz (94 kg)   03/01/21 208 lb (94.3 kg)   02/22/21 198 lb (89.8 kg)     Body mass index is 25 kg/m². CBC:   Lab Results   Component Value Date    WBC 5.7 02/21/2021    RBC 5.28 02/21/2021    HGB 14.1 02/21/2021    HCT 45.1 02/21/2021    MCV 85.4 02/21/2021    RDW 13.4 02/21/2021     02/21/2021       CMP:   Lab Results   Component Value Date     02/21/2021    K 4.3 02/21/2021     02/21/2021    CO2 26 02/21/2021    BUN 15 02/21/2021    CREATININE 1.1 02/21/2021    GFRAA >60 02/21/2021    AGRATIO 1.7 03/13/2020    LABGLOM >60 02/21/2021    GLUCOSE 113 02/21/2021    PROT 6.1 02/20/2021    CALCIUM 8.1 02/21/2021    BILITOT 0.7 02/20/2021    ALKPHOS 65 02/20/2021    AST 26 02/20/2021    ALT 22 02/20/2021       POC Tests: No results for input(s): POCGLU, POCNA, POCK, POCCL, POCBUN, POCHEMO, POCHCT in the last 72 hours.     Coags:   Lab Results   Component Value Date    PROTIME 10.7 02/19/2021    INR 0.89 02/19/2021       HCG (If Applicable): No results found for: PREGTESTUR, PREGSERUM, HCG, HCGQUANT     ABGs: No results found for: PHART, PO2ART, KGL1XWR, OVW7WOI, BEART, F7REHQYJ     Type & Screen (If Applicable):  No results found for: LABABO, LABRH    Drug/Infectious Status (If Applicable):  No results found for: HIV, HEPCAB    COVID-19 Screening (If Applicable):   Lab Results   Component Value Date    COVID19 NOT DETECTED 04/12/2021           Anesthesia Evaluation    Airway: Mallampati: II  TM distance: >3 FB   Neck ROM: full  Mouth opening: > = 3 FB Dental:          Pulmonary:normal exam                               Cardiovascular:  Exercise tolerance: good (>4 METS),   (+) hypertension:, dysrhythmias: SVT, hyperlipidemia         Beta Blocker:  Not on Beta Blocker      ROS comment: EF55-60%, Mild TR & LVH. Neuro/Psych:               GI/Hepatic/Renal:             Endo/Other:    (+) DiabetesType II DM, , .                 Abdominal:           Vascular:                                      Anesthesia Plan      general and MAC     ASA 3       Induction: intravenous. Pre Anesthesia Assessment complete.  Chart reviewed on 4/16/2021        SONNY Dorman - CRNA   4/16/2021

## 2021-04-17 PROBLEM — Z01.818 PRE-OP EVALUATION: Status: RESOLVED | Noted: 2021-03-18 | Resolved: 2021-04-17

## 2021-04-19 ENCOUNTER — ANESTHESIA (OUTPATIENT)
Dept: ENDOSCOPY | Age: 71
End: 2021-04-19
Payer: COMMERCIAL

## 2021-04-19 ENCOUNTER — HOSPITAL ENCOUNTER (OUTPATIENT)
Age: 71
Setting detail: OUTPATIENT SURGERY
Discharge: HOME | End: 2021-04-19
Attending: INTERNAL MEDICINE | Admitting: INTERNAL MEDICINE
Payer: COMMERCIAL

## 2021-04-19 VITALS
SYSTOLIC BLOOD PRESSURE: 138 MMHG | RESPIRATION RATE: 16 BRPM | OXYGEN SATURATION: 96 % | HEIGHT: 75 IN | HEART RATE: 85 BPM | WEIGHT: 200 LBS | BODY MASS INDEX: 24.87 KG/M2 | DIASTOLIC BLOOD PRESSURE: 75 MMHG | TEMPERATURE: 97.3 F

## 2021-04-19 VITALS — SYSTOLIC BLOOD PRESSURE: 101 MMHG | OXYGEN SATURATION: 100 % | DIASTOLIC BLOOD PRESSURE: 52 MMHG

## 2021-04-19 LAB — GLUCOSE BLD-MCNC: 120 MG/DL (ref 70–99)

## 2021-04-19 PROCEDURE — 3609010600 HC COLONOSCOPY POLYPECTOMY SNARE/COLD BIOPSY: Performed by: INTERNAL MEDICINE

## 2021-04-19 PROCEDURE — 7100000010 HC PHASE II RECOVERY - FIRST 15 MIN: Performed by: INTERNAL MEDICINE

## 2021-04-19 PROCEDURE — 3700000001 HC ADD 15 MINUTES (ANESTHESIA): Performed by: INTERNAL MEDICINE

## 2021-04-19 PROCEDURE — 2709999900 HC NON-CHARGEABLE SUPPLY: Performed by: INTERNAL MEDICINE

## 2021-04-19 PROCEDURE — 6360000002 HC RX W HCPCS: Performed by: NURSE ANESTHETIST, CERTIFIED REGISTERED

## 2021-04-19 PROCEDURE — 3700000000 HC ANESTHESIA ATTENDED CARE: Performed by: INTERNAL MEDICINE

## 2021-04-19 PROCEDURE — 82962 GLUCOSE BLOOD TEST: CPT

## 2021-04-19 PROCEDURE — 2580000003 HC RX 258: Performed by: NURSE PRACTITIONER

## 2021-04-19 PROCEDURE — 88305 TISSUE EXAM BY PATHOLOGIST: CPT | Performed by: PATHOLOGY

## 2021-04-19 PROCEDURE — 2500000003 HC RX 250 WO HCPCS: Performed by: NURSE ANESTHETIST, CERTIFIED REGISTERED

## 2021-04-19 PROCEDURE — 7100000011 HC PHASE II RECOVERY - ADDTL 15 MIN: Performed by: INTERNAL MEDICINE

## 2021-04-19 RX ORDER — SODIUM CHLORIDE, SODIUM LACTATE, POTASSIUM CHLORIDE, CALCIUM CHLORIDE 600; 310; 30; 20 MG/100ML; MG/100ML; MG/100ML; MG/100ML
INJECTION, SOLUTION INTRAVENOUS CONTINUOUS
Status: DISCONTINUED | OUTPATIENT
Start: 2021-04-19 | End: 2021-04-19 | Stop reason: HOSPADM

## 2021-04-19 RX ORDER — SODIUM CHLORIDE 0.9 % (FLUSH) 0.9 %
10 SYRINGE (ML) INJECTION EVERY 12 HOURS SCHEDULED
Status: DISCONTINUED | OUTPATIENT
Start: 2021-04-19 | End: 2021-04-19 | Stop reason: HOSPADM

## 2021-04-19 RX ORDER — SODIUM CHLORIDE 0.9 % (FLUSH) 0.9 %
10 SYRINGE (ML) INJECTION PRN
Status: DISCONTINUED | OUTPATIENT
Start: 2021-04-19 | End: 2021-04-19 | Stop reason: HOSPADM

## 2021-04-19 RX ORDER — SODIUM CHLORIDE 9 MG/ML
25 INJECTION, SOLUTION INTRAVENOUS PRN
Status: DISCONTINUED | OUTPATIENT
Start: 2021-04-19 | End: 2021-04-19 | Stop reason: HOSPADM

## 2021-04-19 RX ORDER — PROPOFOL 10 MG/ML
INJECTION, EMULSION INTRAVENOUS PRN
Status: DISCONTINUED | OUTPATIENT
Start: 2021-04-19 | End: 2021-04-19 | Stop reason: SDUPTHER

## 2021-04-19 RX ORDER — LIDOCAINE HYDROCHLORIDE 20 MG/ML
INJECTION, SOLUTION INFILTRATION; PERINEURAL PRN
Status: DISCONTINUED | OUTPATIENT
Start: 2021-04-19 | End: 2021-04-19 | Stop reason: SDUPTHER

## 2021-04-19 RX ADMIN — SODIUM CHLORIDE, POTASSIUM CHLORIDE, SODIUM LACTATE AND CALCIUM CHLORIDE: 600; 310; 30; 20 INJECTION, SOLUTION INTRAVENOUS at 12:39

## 2021-04-19 RX ADMIN — PHENYLEPHRINE HYDROCHLORIDE 50 MCG: 10 INJECTION INTRAVENOUS at 13:46

## 2021-04-19 RX ADMIN — SODIUM CHLORIDE, POTASSIUM CHLORIDE, SODIUM LACTATE AND CALCIUM CHLORIDE: 600; 310; 30; 20 INJECTION, SOLUTION INTRAVENOUS at 13:55

## 2021-04-19 RX ADMIN — SODIUM CHLORIDE, POTASSIUM CHLORIDE, SODIUM LACTATE AND CALCIUM CHLORIDE: 600; 310; 30; 20 INJECTION, SOLUTION INTRAVENOUS at 13:14

## 2021-04-19 RX ADMIN — PHENYLEPHRINE HYDROCHLORIDE 50 MCG: 10 INJECTION INTRAVENOUS at 13:41

## 2021-04-19 RX ADMIN — PROPOFOL 240 MG: 10 INJECTION, EMULSION INTRAVENOUS at 13:33

## 2021-04-19 RX ADMIN — PHENYLEPHRINE HYDROCHLORIDE 50 MCG: 10 INJECTION INTRAVENOUS at 13:49

## 2021-04-19 RX ADMIN — LIDOCAINE HYDROCHLORIDE 100 MG: 20 INJECTION, SOLUTION INFILTRATION; PERINEURAL at 13:33

## 2021-04-19 ASSESSMENT — PAIN SCALES - GENERAL
PAINLEVEL_OUTOF10: 0
PAINLEVEL_OUTOF10: 0

## 2021-04-19 NOTE — ANESTHESIA POSTPROCEDURE EVALUATION
Department of Anesthesiology  Postprocedure Note    Patient: Kylee Solis  MRN: [id number]  YOB: 1950  Date of evaluation: 4/19/2021  Time:  2:01 PM     Procedure Summary     Date: 04/19/21 Room / Location: [address]    Anesthesia Start: 2823 Anesthesia Stop: 1400    Procedure: COLONOSCOPY POLYPECTOMY SNARE/COLD BIOPSY (N/A ) Diagnosis: (BLOOD IN [place], FAM HX OF COLON CA)    Surgeons: Efrem Sue MD Responsible Provider: Willie Boas, MD    Anesthesia Type: MAC ASA Status: 3          Anesthesia Type: MAC    Rebekah Phase I:  10    Rebekah Phase II:  10    Last vitals: Reviewed and per EMR flowsheets.        Anesthesia Post Evaluation    Patient location during evaluation: bedside  Patient participation: complete - patient participated  Level of consciousness: awake and alert  Pain score: 0  Airway patency: patent  Nausea & Vomiting: no nausea and no vomiting  Complications: no  Cardiovascular status: hemodynamically stable  Respiratory status: acceptable, room air, spontaneous ventilation and nonlabored ventilation  Hydration status: euvolemic

## 2021-04-19 NOTE — PROGRESS NOTES
REPORT RECEIVED FROM YOAN GRIJALVA IN [facility] [place] [facility]. PREOP QUESTIONS, NPO STATUS AND ALLERGIES VERIFIED WITH PT. DENIES TAKING BLOOD THINNERS OR BETA BLOCKERS.

## 2021-04-19 NOTE — BRIEF OP NOTE
Brief Postoperative Note      Patient: Latonya Chin  YOB: 1950  MRN: [id number]    Date of Procedure: 4/19/2021    Pre-Op Diagnosis: BLOOD IN STOOL, FAM HX OF COLON CA    Post-Op Diagnosis: Two diminutive sigmoid polyps removed with cold biopsy forceps,  Internal hemorrhoids. Procedure(s):  COLONOSCOPY POLYPECTOMY SNARE/COLD BIOPSY    Surgeon(s):  Deepti Mazariegos MD    Assistant:  * No surgical staff found *    Anesthesia: Monitor Anesthesia Care    Estimated Blood Loss (mL): Minimal    Complications: None    Specimens:   ID Type Source Tests Collected by Time Destination   A : lupe ozuna Tissue Tissue SURGICAL PATHOLOGY Deepti Mazariegos MD 4/19/2021 1350        Implants:  * No implants in log *      Drains: * No LDAs found *    Findings: As above.      Electronically signed by Deepti Mazariegos MD on 4/19/2021 at 1:57 PM

## 2021-04-19 NOTE — ANESTHESIA PRE PROCEDURE
Department of Anesthesiology  Preprocedure Note       Name:  Ruthie Walls   Age:  72 y.o.  :  1950                                          MRN:  [id number]         Date:  2021      Surgeon: Micheal Urrutia):  Yumiko Palma MD    Procedure: Procedure(s):  COLONOSCOPY DIAGNOSTIC    Medications prior to admission:   Prior to Admission medications    Medication Sig Start Date End Date Taking? Authorizing Provider   SITagliptin (JANUVIA) 100 MG tablet Take 1 tablet by mouth daily 3/1/21  Yes Jaleel Herrera MD   aspirin 81 MG EC tablet Take 1 tablet by mouth daily 21  Yes Debra Hedrick MD   atorvastatin (LIPITOR) 20 MG tablet Take 1 tablet by mouth daily 21  Yes Debra Hedrick MD   metFORMIN (GLUCOPHAGE-XR) 500 MG extended release tablet take 2 tablets by mouth once daily 10/1/20  Yes Jaleel Herrera MD   lisinopril (PRINIVIL;ZESTRIL) 10 MG tablet take 1 tablet by mouth once daily 10/1/20  Yes Jaleel Herrera MD   dilTIAZem (CARDIZEM CD) 120 MG extended release capsule Take 1 capsule by mouth daily 21   Debra Hedrick MD   polyethylene glycol [facility]) 17 GM/SCOOP powder Take 17 g by mouth daily 2/15/21 6/15/21  SONNY Paz - CNP   ibuprofen (ADVIL;MOTRIN) 800 MG tablet Take 1 tablet by mouth every 6 hours as needed for Pain 21   Jacquie Toth PA-C   Blood Glucose Monitoring Suppl (FREESTYLE LITE) MELODY 1 Device by Does not apply route daily 21   Jaleel Herrera MD   blood glucose test strips (FREESTYLE LITE) strip 1 each by In Vitro route daily As needed.  21   Jaleel Herrera MD   [facility] VERIO strip TEST TWICE DAILY AND AS NEEDED 20   MD Josesito Galloway Quiet LANCETS FINE MISC by Does not apply route    Historical Provider, MD   Glucose Blood (Miladys Ontiveros VI) by In Vitro route    Historical Provider, MD       Current medications:    Current Facility-Administered Medications   Medication Dose Route Frequency Provider Last Rate Last Admin    0.9 % sodium chloride infusion  25 mL Intravenous PRN Ant Hora, APRN - CNP        lactated ringers infusion   Intravenous Continuous Ant Hora, APRN - CNP 75 mL/hr at 04/19/21 1239 New Bag at 04/19/21 1314    sodium chloride flush 0.9 % injection 10 mL  10 mL Intravenous 2 times per day Ant Hora, APRN - CNP        sodium chloride flush 0.9 % injection 10 mL  10 mL Intravenous PRN Ant Hora, APRN - CNP           Allergies:  No Known Allergies    Problem List:    Patient Active Problem List   Diagnosis Code    Hypertension I10    Type 2 diabetes mellitus ([facility] [address] E11.9    Hyperlipidemia E78.5    PSVT 6/2 2020 and 1/20/21 I47.1    ASCVD (arteriosclerotic cardiovascular disease) I25.10    Hemorrhoids K64.9    Stroke-like symptoms R29.90    Dizziness and giddiness R42       Past Medical History:        Diagnosis Date    H/O echocardiogram 10/06/2020    EF55-60%, Mild TR & LVH.  History of exercise stress test 10/06/2020    Treadmill, Normal    Hyperlipidemia     Hypertension     PSVT (paroxysmal supraventricular tachycardia) ([facility] [address] 9/24/2020 9/2/2020 ECG at ER at [facility]    Type 2 diabetes mellitus [facility])        Past Surgical History:  History reviewed. No pertinent surgical history.     Social History:    Social History     Tobacco Use    Smoking status: Never Smoker    Smokeless tobacco: Never Used   Substance Use Topics    Alcohol use: Not Currently     Comment: 2-3 beers per year/caffeine 2 cups of coffee a week and 4 pops a week                                Counseling given: Not Answered      Vital Signs (Current):   Vitals:    04/14/21 1257 04/19/21 1219   BP:  138/79   Pulse:  85   Resp:  18   Temp:  36.2 °C (97.2 °F)   TempSrc:  Temporal   SpO2:  97%   Weight: 200 lb (90.7 kg) 200 lb (90.7 kg)   Height: 6' 3\" (1.905 m) 6' 3\" (1.905 m)                                              BP Readings from Last 3 Encounters:   04/19/21 138/79 04/12/21 (!) 152/82   03/18/21 134/78       NPO Status: Time of last liquid consumption: 1100                        Time of last solid consumption: 0030                        Date of last liquid consumption: 04/19/21                        Date of last solid food consumption: 04/18/21    BMI:   Wt Readings from Last 3 Encounters:   04/19/21 200 lb (90.7 kg)   03/18/21 207 lb 3.2 oz (94 kg)   03/01/21 208 lb (94.3 kg)     Body mass index is 25 kg/m².     CBC:   Lab Results   Component Value Date    WBC 5.7 02/21/2021    RBC 5.28 02/21/2021    HGB 14.1 02/21/2021    HCT 45.1 02/21/2021    MCV 85.4 02/21/2021    RDW 13.4 02/21/2021     02/21/2021       CMP:   Lab Results   Component Value Date     02/21/2021    K 4.3 02/21/2021     02/21/2021    CO2 26 02/21/2021    BUN 15 02/21/2021    CREATININE 1.1 02/21/2021    GFRAA >60 02/21/2021    AGRATIO 1.7 03/13/2020    LABGLOM >60 02/21/2021    GLUCOSE 113 02/21/2021    PROT 6.1 02/20/2021    CALCIUM 8.1 02/21/2021    BILITOT 0.7 02/20/2021    ALKPHOS 65 02/20/2021    AST 26 02/20/2021    ALT 22 02/20/2021       POC Tests:   Recent Labs     04/19/21  1229   POCGLU 120*       Coags:   Lab Results   Component Value Date    PROTIME 10.7 02/19/2021    INR 0.89 02/19/2021       HCG (If Applicable): No results found for: PREGTESTUR, PREGSERUM, HCG, HCGQUANT     ABGs: No results found for: PHART, PO2ART, GKE6VXS, XRT9UAI, BEART, U4EFMZUT     Type & Screen (If Applicable):  No results found for: LABABO, LABRH    Drug/Infectious Status (If Applicable):  No results found for: HIV, HEPCAB    COVID-19 Screening (If Applicable):   Lab Results   Component Value Date    COVID19 NOT DETECTED 04/12/2021           Anesthesia Evaluation  Patient summary reviewed  Airway: Mallampati: I  TM distance: >3 FB   Neck ROM: full  Mouth opening: > = 3 FB Dental:      Comment: Poor dentition, numerous missing teeth    Pulmonary:Negative Pulmonary ROS and normal exam Cardiovascular:  Exercise tolerance: good (>4 METS),   (+) hypertension:, dysrhythmias: SVT, hyperlipidemia         Beta Blocker:  Not on Beta Blocker      ROS comment: EF55-60%, Mild TR & LVH. Neuro/Psych:   Negative Neuro/Psych ROS              GI/Hepatic/Renal:             Endo/Other:    (+) DiabetesType II DM, , .                 Abdominal:           Vascular: negative vascular ROS. Anesthesia Plan      MAC     ASA 3       Induction: intravenous. Anesthetic plan and risks discussed with patient. SONNY Lopez CRNA   4/19/2021        Pre Anesthesia Evaluation complete. Anesthesia plan, risks, benefits, alternatives, and personnel discussed with patient and/or legal guardian. Patient and/or legal guardian verbalized an understanding and agreed to proceed. Anesthesia plan discussed with care team members and agreed upon.   SONNY Lopez CRNA  4/19/2021

## 2021-04-19 NOTE — PROGRESS NOTES
BEDSIDE REPORT GIVEN TO Kortney Guzmán RN. PT AWAKE AND RESPONSIVE, TALKING WITH STAFF. VS STABLE. FRIEND, MARGARITA, AT BEDSIDE. SNACK AND BEVERAGE OFFERED.

## 2021-04-19 NOTE — PROGRESS NOTES
1407 Received from OR, placed on monitor. Denies pain, nausea. Call light in reach. 1409 Family at bedside. Pt requests crackers, orange juice. Takes without difficulty. 1415 Report given to Odell Greenfield. Transfer of care.  Jackie Murphy

## 2021-04-26 ENCOUNTER — OFFICE VISIT (OUTPATIENT)
Dept: GASTROENTEROLOGY | Age: 71
End: 2021-04-26
Payer: COMMERCIAL

## 2021-04-26 ENCOUNTER — TELEPHONE (OUTPATIENT)
Dept: GASTROENTEROLOGY | Age: 71
End: 2021-04-26

## 2021-04-26 VITALS
DIASTOLIC BLOOD PRESSURE: 60 MMHG | SYSTOLIC BLOOD PRESSURE: 110 MMHG | BODY MASS INDEX: 25.59 KG/M2 | HEIGHT: 75 IN | HEART RATE: 98 BPM | WEIGHT: 205.8 LBS | TEMPERATURE: 98.3 F | OXYGEN SATURATION: 95 %

## 2021-04-26 DIAGNOSIS — K62.89 HYPERTROPHY OF ANAL PAPILLAE: Primary | ICD-10-CM

## 2021-04-26 DIAGNOSIS — K64.8 INTERNAL HEMORRHOIDS: ICD-10-CM

## 2021-04-26 DIAGNOSIS — K59.01 SLOW TRANSIT CONSTIPATION: ICD-10-CM

## 2021-04-26 PROCEDURE — 1036F TOBACCO NON-USER: CPT | Performed by: NURSE PRACTITIONER

## 2021-04-26 PROCEDURE — 99213 OFFICE O/P EST LOW 20 MIN: CPT | Performed by: NURSE PRACTITIONER

## 2021-04-26 PROCEDURE — 4040F PNEUMOC VAC/ADMIN/RCVD: CPT | Performed by: NURSE PRACTITIONER

## 2021-04-26 PROCEDURE — 1123F ACP DISCUSS/DSCN MKR DOCD: CPT | Performed by: NURSE PRACTITIONER

## 2021-04-26 PROCEDURE — G8427 DOCREV CUR MEDS BY ELIG CLIN: HCPCS | Performed by: NURSE PRACTITIONER

## 2021-04-26 PROCEDURE — 3017F COLORECTAL CA SCREEN DOC REV: CPT | Performed by: NURSE PRACTITIONER

## 2021-04-26 PROCEDURE — G8417 CALC BMI ABV UP PARAM F/U: HCPCS | Performed by: NURSE PRACTITIONER

## 2021-04-26 NOTE — PROGRESS NOTES
Cindy Vieira 72 y.o. male was seen by NIK Whitfield on 4/26/2021     Wt Readings from Last 3 Encounters:   04/26/21 205 lb 12.8 oz (93.4 kg)   04/19/21 200 lb (90.7 kg)   03/18/21 207 lb 3.2 oz (94 kg)       HPI  Cindy Vieira is a pleasant 72 y.o.  male who presents today for follow-up on colonoscopy. His colonoscopy showed internal hemorrhoids, anal papillae and two hyperplastic colon polyps that were removed. He reports feeling good. His bowel pattern has normalized. His bowel pattern is daily to twice daily with soft brown formed stools. He mentioned minimal amount of blood noted after bowel movement yesterday. No active bright red bleeding or melena. No pain with defecation. No diarrhea. He has constipation three times a week. Metamucil helps and takes every other day. Stool softeners help. No excess belching or flatulence. His appetite is good and weight is stable. No abdominal pain, bloating or distention. No nausea or vomiting. No heartburn or acid reflux. No nocturnal awakenings with acid reflux. No dysphagia or pain with swallowing. His father had colon cancer at age 58.       ROS  Review of Systems   Constitutional: Negative for appetite change, chills, diaphoresis, fatigue, fever and unexpected weight change. HENT: Positive for tinnitus (intermittent). Negative for ear pain and hearing loss. Eyes: Negative for pain, discharge and visual disturbance. Respiratory: Positive for shortness of breath (intermittent). Negative for cough and wheezing. Cardiovascular: Negative for chest pain, palpitations and leg swelling. Gastrointestinal: Positive for blood in stool and constipation. Negative for abdominal pain, diarrhea, nausea and vomiting. Endocrine: Negative for cold intolerance and heat intolerance. Genitourinary: Negative for dysuria, frequency, hematuria and urgency. Musculoskeletal: Negative for back pain, myalgias and neck pain.    Skin: Negative for color change, pallor and rash. Allergic/Immunologic: Negative for environmental allergies and food allergies. Neurological: Negative for dizziness, seizures, weakness and headaches. Hematological: Does not bruise/bleed easily. Psychiatric/Behavioral: Negative for dysphoric mood and sleep disturbance. The patient is not nervous/anxious.           Allergies  No Known Allergies    Medications  Current Outpatient Medications   Medication Sig Dispense Refill    SITagliptin (JANUVIA) 100 MG tablet Take 1 tablet by mouth daily 30 tablet 2    aspirin 81 MG EC tablet Take 1 tablet by mouth daily 30 tablet 3    atorvastatin (LIPITOR) 20 MG tablet Take 1 tablet by mouth daily 90 tablet 1    dilTIAZem (CARDIZEM CD) 120 MG extended release capsule Take 1 capsule by mouth daily 30 capsule 3    polyethylene glycol (MIRALAX) 17 GM/SCOOP powder Take 17 g by mouth daily 510 g 3    ibuprofen (ADVIL;MOTRIN) 800 MG tablet Take 1 tablet by mouth every 6 hours as needed for Pain 120 tablet 0    Blood Glucose Monitoring Suppl (FREESTYLE LITE) MELODY 1 Device by Does not apply route daily 1 Device 0    blood glucose test strips (FREESTYLE LITE) strip 1 each by In Vitro route daily As needed. 100 each 3    ONETOUCH VERIO strip TEST TWICE DAILY AND AS NEEDED 100 strip 5    metFORMIN (GLUCOPHAGE-XR) 500 MG extended release tablet take 2 tablets by mouth once daily 180 tablet 1    lisinopril (PRINIVIL;ZESTRIL) 10 MG tablet take 1 tablet by mouth once daily 90 tablet 1    ONETOUCH DELICA LANCETS FINE MISC by Does not apply route      Glucose Blood (ONETOUCH VERIO VI) by In Vitro route       No current facility-administered medications for this visit. Past medical history:   He has a past medical history of H/O echocardiogram, History of exercise stress test, Hyperlipidemia, Hypertension, PSVT (paroxysmal supraventricular tachycardia) ([address] and Type 2 diabetes mellitus ([facility] [address]     Past surgical history:  He has a past surgical history that includes Colonoscopy (04/19/2021) and Colonoscopy (N/A, 4/19/2021). Social History:  He reports that he has never smoked. He has never used smokeless tobacco. He reports previous alcohol use. He reports that he does not use drugs. Family history:  His family history includes Cancer in his father; Diabetes in his mother and sister; Heart Disease in his mother; High Blood Pressure in his sister; High Cholesterol in his mother and sister. Objective    Vitals:    04/26/21 1116   BP: 110/60   Pulse: 98   Temp: 98.3 °F (36.8 °C)   SpO2: 95%        Physical exam    Physical Exam  Constitutional:       General: He is not in acute distress. Appearance: Normal appearance. He is well-developed. He is not ill-appearing, toxic-appearing or diaphoretic. HENT:      Head: Normocephalic and atraumatic. Nose: Nose normal.      Mouth/Throat:      Mouth: Mucous membranes are moist.   Neck:      Musculoskeletal: Neck supple. Cardiovascular:      Rate and Rhythm: Normal rate and regular rhythm. Pulses: Normal pulses. Heart sounds: Normal heart sounds. No murmur. No gallop. Pulmonary:      Effort: Pulmonary effort is normal. No respiratory distress. Breath sounds: Normal breath sounds. No stridor. No wheezing or rhonchi. Abdominal:      General: Bowel sounds are normal. There is no distension. Palpations: Abdomen is soft. There is no mass. Tenderness: There is no abdominal tenderness. Hernia: No hernia is present. Musculoskeletal: Normal range of motion. Skin:     General: Skin is warm and dry. Neurological:      Mental Status: He is alert and oriented to person, place, and time.    Psychiatric:         Mood and Affect: Mood normal.         Admission on 04/19/2021, Discharged on 04/19/2021   Component Date Value Ref Range Status    POC Glucose 04/19/2021 120* 70 - 99 MG/DL Final   Hospital Outpatient Visit on 04/12/2021   Component Date Value most likely diet related or slow transit. The patient was encouraged to continue taking Miralax 17 gm and Colace twice daily for treatment of constipation. Recommend good bowel regimen and increase fruit, fiber and fluids. 4.  The patient was encouraged to follow-up as needed. Recommend repeat colonoscopy in five years will place on recall.

## 2021-04-27 ENCOUNTER — TELEPHONE (OUTPATIENT)
Dept: GASTROENTEROLOGY | Age: 71
End: 2021-04-27

## 2021-05-20 DIAGNOSIS — E11.9 TYPE 2 DIABETES MELLITUS WITHOUT COMPLICATION, WITHOUT LONG-TERM CURRENT USE OF INSULIN (HCC): ICD-10-CM

## 2021-05-20 RX ORDER — BLOOD-GLUCOSE METER
KIT MISCELLANEOUS
Qty: 1 DEVICE | Refills: 0 | Status: SHIPPED | OUTPATIENT
Start: 2021-05-20 | End: 2021-11-29

## 2021-05-28 RX ORDER — SITAGLIPTIN 100 MG/1
TABLET, FILM COATED ORAL
Qty: 90 TABLET | Refills: 1 | Status: SHIPPED | OUTPATIENT
Start: 2021-05-28 | End: 2021-12-21 | Stop reason: SDUPTHER

## 2021-06-01 ENCOUNTER — OFFICE VISIT (OUTPATIENT)
Dept: FAMILY MEDICINE CLINIC | Age: 71
End: 2021-06-01
Payer: MEDICARE

## 2021-06-01 ENCOUNTER — OFFICE VISIT (OUTPATIENT)
Dept: FAMILY MEDICINE CLINIC | Age: 71
End: 2021-06-01
Payer: COMMERCIAL

## 2021-06-01 VITALS
OXYGEN SATURATION: 95 % | HEIGHT: 75 IN | SYSTOLIC BLOOD PRESSURE: 116 MMHG | TEMPERATURE: 98.3 F | WEIGHT: 197 LBS | HEART RATE: 80 BPM | DIASTOLIC BLOOD PRESSURE: 70 MMHG | BODY MASS INDEX: 24.49 KG/M2

## 2021-06-01 VITALS
DIASTOLIC BLOOD PRESSURE: 70 MMHG | HEART RATE: 80 BPM | BODY MASS INDEX: 24.49 KG/M2 | HEIGHT: 75 IN | WEIGHT: 197 LBS | SYSTOLIC BLOOD PRESSURE: 116 MMHG

## 2021-06-01 DIAGNOSIS — I47.1 PSVT (PAROXYSMAL SUPRAVENTRICULAR TACHYCARDIA) (HCC): ICD-10-CM

## 2021-06-01 DIAGNOSIS — E11.00 TYPE 2 DIABETES MELLITUS WITH HYPEROSMOLARITY WITHOUT COMA, WITHOUT LONG-TERM CURRENT USE OF INSULIN (HCC): Primary | ICD-10-CM

## 2021-06-01 DIAGNOSIS — Z00.00 ROUTINE GENERAL MEDICAL EXAMINATION AT A HEALTH CARE FACILITY: Primary | ICD-10-CM

## 2021-06-01 LAB — HBA1C MFR BLD: 7.1 %

## 2021-06-01 PROCEDURE — 2022F DILAT RTA XM EVC RTNOPTHY: CPT | Performed by: FAMILY MEDICINE

## 2021-06-01 PROCEDURE — 3051F HG A1C>EQUAL 7.0%<8.0%: CPT | Performed by: FAMILY MEDICINE

## 2021-06-01 PROCEDURE — 1123F ACP DISCUSS/DSCN MKR DOCD: CPT | Performed by: FAMILY MEDICINE

## 2021-06-01 PROCEDURE — G8427 DOCREV CUR MEDS BY ELIG CLIN: HCPCS | Performed by: FAMILY MEDICINE

## 2021-06-01 PROCEDURE — G8420 CALC BMI NORM PARAMETERS: HCPCS | Performed by: FAMILY MEDICINE

## 2021-06-01 PROCEDURE — 83036 HEMOGLOBIN GLYCOSYLATED A1C: CPT | Performed by: FAMILY MEDICINE

## 2021-06-01 PROCEDURE — 3017F COLORECTAL CA SCREEN DOC REV: CPT | Performed by: FAMILY MEDICINE

## 2021-06-01 PROCEDURE — 4040F PNEUMOC VAC/ADMIN/RCVD: CPT | Performed by: FAMILY MEDICINE

## 2021-06-01 PROCEDURE — 1036F TOBACCO NON-USER: CPT | Performed by: FAMILY MEDICINE

## 2021-06-01 PROCEDURE — 99213 OFFICE O/P EST LOW 20 MIN: CPT | Performed by: FAMILY MEDICINE

## 2021-06-01 PROCEDURE — G0438 PPPS, INITIAL VISIT: HCPCS | Performed by: FAMILY MEDICINE

## 2021-06-01 SDOH — ECONOMIC STABILITY: FOOD INSECURITY: WITHIN THE PAST 12 MONTHS, YOU WORRIED THAT YOUR FOOD WOULD RUN OUT BEFORE YOU GOT MONEY TO BUY MORE.: NEVER TRUE

## 2021-06-01 SDOH — ECONOMIC STABILITY: FOOD INSECURITY: WITHIN THE PAST 12 MONTHS, THE FOOD YOU BOUGHT JUST DIDN'T LAST AND YOU DIDN'T HAVE MONEY TO GET MORE.: NEVER TRUE

## 2021-06-01 ASSESSMENT — PATIENT HEALTH QUESTIONNAIRE - PHQ9
1. LITTLE INTEREST OR PLEASURE IN DOING THINGS: 0
SUM OF ALL RESPONSES TO PHQ QUESTIONS 1-9: 1
SUM OF ALL RESPONSES TO PHQ9 QUESTIONS 1 & 2: 1
SUM OF ALL RESPONSES TO PHQ QUESTIONS 1-9: 1
SUM OF ALL RESPONSES TO PHQ QUESTIONS 1-9: 1
2. FEELING DOWN, DEPRESSED OR HOPELESS: 1

## 2021-06-01 ASSESSMENT — LIFESTYLE VARIABLES: HOW OFTEN DO YOU HAVE A DRINK CONTAINING ALCOHOL: 0

## 2021-06-01 ASSESSMENT — SOCIAL DETERMINANTS OF HEALTH (SDOH): HOW HARD IS IT FOR YOU TO PAY FOR THE VERY BASICS LIKE FOOD, HOUSING, MEDICAL CARE, AND HEATING?: NOT HARD AT ALL

## 2021-06-01 NOTE — PATIENT INSTRUCTIONS
Personalized Preventive Plan for Elmo Galeazzi - 6/1/2021  Medicare offers a range of preventive health benefits. Some of the tests and screenings are paid in full while other may be subject to a deductible, co-insurance, and/or copay. Some of these benefits include a comprehensive review of your medical history including lifestyle, illnesses that may run in your family, and various assessments and screenings as appropriate. After reviewing your medical record and screening and assessments performed today your provider may have ordered immunizations, labs, imaging, and/or referrals for you. A list of these orders (if applicable) as well as your Preventive Care list are included within your After Visit Summary for your review. Other Preventive Recommendations:    · A preventive eye exam performed by an eye specialist is recommended every 1-2 years to screen for glaucoma; cataracts, macular degeneration, and other eye disorders. · A preventive dental visit is recommended every 6 months. · Try to get at least 150 minutes of exercise per week or 10,000 steps per day on a pedometer . · Order or download the FREE \"Exercise & Physical Activity: Your Everyday Guide\" from [employer]. Call 6-335.301.7058 or search [employer] online. · You need 0311-9708 mg of calcium and 8699-5989 IU of vitamin D per day. It is possible to meet your calcium requirement with diet alone, but a vitamin D supplement is usually necessary to meet this goal.  · When exposed to the sun, use a sunscreen that protects against both UVA and UVB radiation with an SPF of 30 or greater. Reapply every 2 to 3 hours or after sweating, drying off with a towel, or swimming. · Always wear a seat belt when traveling in a car. Always wear a helmet when riding a bicycle or motorcycle. Heart-Healthy Diet   Sodium, Fat, and Cholesterol Controlled Diet       What Is a Heart Healthy Diet?    A heart-healthy cholesterol, this type of cholesterol actually carries cholesterol away from your arteries and may, therefore, help lower your risk of having a heart attack. You want this level to be high (ideally greater than 60). It is a risk to have a level less than 40. You can raise this good cholesterol by eating olive oil, canola oil, avocados, or nuts. Exercise raises this level, too. Fat    Fat is calorie dense and packs a lot of calories into a small amount of food. Even though fats should be limited due to their high calorie content, not all fats are bad. In fact, some fats are quite healthful. Fat can be broken down into four main types. The good-for-you fats are:   Monounsaturated fat  found in oils such as olive and canola, avocados, and nuts and natural nut butters; can decrease cholesterol levels, while keeping levels of HDL cholesterol high   Polyunsaturated fat  found in oils such as safflower, sunflower, soybean, corn, and sesame; can decrease total cholesterol and LDL cholesterol   Omega-3 fatty acids  particularly those found in fatty fish (such as salmon, trout, tuna, mackerel, herring, and sardines); can decrease risk of arrhythmias, decrease triglyceride levels, and slightly lower blood pressure   The fats that you want to limit are:   Saturated fat  found in animal products, many fast foods, and a few vegetables; increases total blood cholesterol, including LDL levels   Animal fats that are saturated include: butter, lard, whole-milk dairy products, meat fat, and poultry skin   Vegetable fats that are saturated include: hydrogenated shortening, palm oil, coconut oil, cocoa butter   Hydrogenated or trans fat  found in margarine and vegetable shortening, most shelf stable snack foods, and fried foods; increases LDL and decreases HDL     It is generally recommended that you limit your total fat for the day to less than 30% of your total calories.  If you follow an 1800-calorie heart healthy diet, for example, this would mean 60 grams of fat or less per day. Saturated fat and trans fat in your diet raises your blood cholesterol the most, much more than dietary cholesterol does. For this reason, on a heart-healthy diet, less than 7% of your calories should come from saturated fat and ideally 0% from trans fat. On an 1800-calorie diet, this translates into less than 14 grams of saturated fat per day, leaving 46 grams of fat to come from mono- and polyunsaturated fats.    Food Choices on a Heart Healthy Diet   Food Category   Foods Recommended   Foods to Avoid   Grains   Breads and rolls without salted tops Most dry and cooked cereals Unsalted crackers and breadsticks Low-sodium or homemade breadcrumbs or stuffing All rice and pastas   Breads, rolls, and crackers with salted tops High-fat baked goods (eg, muffins, donuts, pastries) Quick breads, self-rising flour, and biscuit mixes Regular bread crumbs Instant hot cereals Commercially prepared rice, pasta, or stuffing mixes   Vegetables   Most fresh, frozen, and low-sodium canned vegetables Low-sodium and salt-free vegetable juices Canned vegetables if unsalted or rinsed   Regular canned vegetables and juices, including sauerkraut and pickled vegetables Frozen vegetables with sauces Commercially prepared potato and vegetable mixes   Fruits   Most fresh, frozen, and canned fruits All fruit juices   Fruits processed with salt or sodium   Milk   Nonfat or low-fat (1%) milk Nonfat or low-fat yogurt Cottage cheese, low-fat ricotta, cheeses labeled as low-fat and low-sodium   Whole milk Reduced-fat (2%) milk Malted and chocolate milk Full fat yogurt Most cheeses (unless low-fat and low salt) Buttermilk (no more than 1 cup per week)   Meats and Beans   Lean cuts of fresh or frozen beef, veal, lamb, or pork (look for the word loin) Fresh or frozen poultry without the skin Fresh or frozen fish and some shellfish Egg whites and egg substitutes (Limit whole eggs to three per and cholesterol amounts. For products low in sodium, look for sodium free, very low sodium, low sodium, no added salt, and unsalted   Skip the salt when cooking or at the table; if food needs more flavor, get creative and try out different herbs and spices. Garlic and onion also add substantial flavor to foods. Trim any visible fat off meat and poultry before cooking, and drain the fat off after blackman. Use cooking methods that require little or no added fat, such as grilling, boiling, baking, poaching, broiling, roasting, steaming, stir-frying, and sauting. Avoid fast food and convenience food. They tend to be high in saturated and trans fat and have a lot of added salt. Talk to a registered dietitian for individualized diet advice. Last Reviewed: March 2011 Madhu Seals MS, MPH, RD   Updated: 3/29/2011   ·     Heart-Healthy Diet   Sodium, Fat, and Cholesterol Controlled Diet       What Is a Heart Healthy Diet? A heart-healthy diet is one that limits sodium , certain types of fat , and cholesterol . This type of diet is recommended for:   People with any form of cardiovascular disease (eg, coronary heart disease , peripheral vascular disease , previous heart attack , previous stroke )   People with risk factors for cardiovascular disease, such as high blood pressure , high cholesterol , or diabetes   Anyone who wants to lower their risk of developing cardiovascular disease   Sodium    Sodium is a mineral found in many foods. In general, most people consume much more sodium than they need. Diets high in sodium can increase blood pressure and lead to edema (water retention). On a heart-healthy diet, you should consume no more than 2,300 mg (milligrams) of sodium per dayabout the amount in one teaspoon of table salt. The foods highest in sodium include table salt (about 50% sodium), processed foods, convenience foods, and preserved foods.    Cholesterol    Cholesterol is a fat-like, waxy substance in your blood. Our bodies make some cholesterol. It is also found in animal products, with the highest amounts in fatty meat, egg yolks, whole milk, cheese, shellfish, and organ meats. On a heart-healthy diet, you should limit your cholesterol intake to less than 200 mg per day. It is normal and important to have some cholesterol in your bloodstream. But too much cholesterol can cause plaque to build up within your arteries, which can eventually lead to a heart attack or stroke. The two types of cholesterol that are most commonly referred to are:   Low-density lipoprotein (LDL) cholesterol  Also known as bad cholesterol, this is the cholesterol that tends to build up along your arteries. Bad cholesterol levels are increased by eating fats that are saturated or hydrogenated. Optimal level of this cholesterol is less than 100. Over 130 starts to get risky for heart disease. High-density lipoprotein (HDL) cholesterol  Also known as good cholesterol, this type of cholesterol actually carries cholesterol away from your arteries and may, therefore, help lower your risk of having a heart attack. You want this level to be high (ideally greater than 60). It is a risk to have a level less than 40. You can raise this good cholesterol by eating olive oil, canola oil, avocados, or nuts. Exercise raises this level, too. Fat    Fat is calorie dense and packs a lot of calories into a small amount of food. Even though fats should be limited due to their high calorie content, not all fats are bad. In fact, some fats are quite healthful. Fat can be broken down into four main types.    The good-for-you fats are:   Monounsaturated fat  found in oils such as olive and canola, avocados, and nuts and natural nut butters; can decrease cholesterol levels, while keeping levels of HDL cholesterol high   Polyunsaturated fat  found in oils such as safflower, sunflower, soybean, corn, and sesame; can decrease total cholesterol and LDL cholesterol   Omega-3 fatty acids  particularly those found in fatty fish (such as salmon, trout, tuna, mackerel, herring, and sardines); can decrease risk of arrhythmias, decrease triglyceride levels, and slightly lower blood pressure   The fats that you want to limit are:   Saturated fat  found in animal products, many fast foods, and a few vegetables; increases total blood cholesterol, including LDL levels   Animal fats that are saturated include: butter, lard, whole-milk dairy products, meat fat, and poultry skin   Vegetable fats that are saturated include: hydrogenated shortening, palm oil, coconut oil, cocoa butter   Hydrogenated or trans fat  found in margarine and vegetable shortening, most shelf stable snack foods, and fried foods; increases LDL and decreases HDL     It is generally recommended that you limit your total fat for the day to less than 30% of your total calories. If you follow an 1800-calorie heart healthy diet, for example, this would mean 60 grams of fat or less per day. Saturated fat and trans fat in your diet raises your blood cholesterol the most, much more than dietary cholesterol does. For this reason, on a heart-healthy diet, less than 7% of your calories should come from saturated fat and ideally 0% from trans fat. On an 1800-calorie diet, this translates into less than 14 grams of saturated fat per day, leaving 46 grams of fat to come from mono- and polyunsaturated fats.    Food Choices on a Heart Healthy Diet   Food Category   Foods Recommended   Foods to Avoid   Grains   Breads and rolls without salted tops Most dry and cooked cereals Unsalted crackers and breadsticks Low-sodium or homemade breadcrumbs or stuffing All rice and pastas   Breads, rolls, and crackers with salted tops High-fat baked goods (eg, muffins, donuts, pastries) Quick breads, self-rising flour, and biscuit mixes Regular bread crumbs Instant hot cereals Commercially prepared rice, pasta, or stuffing mixes Salted snack foods Canned olives Meat tenderizers, seasoning salt, and most flavored vinegars   Beverages   Low-sodium carbonated beverages Tea and coffee in moderation Soy milk   Commercially softened water   Suggestions   Make whole grains, fruits, and vegetables the base of your diet. Choose heart-healthy fats such as canola, olive, and flaxseed oil, and foods high in heart-healthy fats, such as nuts, seeds, soybeans, tofu, and fish. Eat fish at least twice per week; the fish highest in omega-3 fatty acids and lowest in mercury include salmon, herring, mackerel, sardines, and canned chunk light tuna. If you eat fish less than twice per week or have high triglycerides, talk to your doctor about taking fish oil supplements. Read food labels. For products low in fat and cholesterol, look for fat free, low-fat, cholesterol free, saturated fat free, and trans fat freeAlso scan the Nutrition Facts Label, which lists saturated fat, trans fat, and cholesterol amounts. For products low in sodium, look for sodium free, very low sodium, low sodium, no added salt, and unsalted   Skip the salt when cooking or at the table; if food needs more flavor, get creative and try out different herbs and spices. Garlic and onion also add substantial flavor to foods. Trim any visible fat off meat and poultry before cooking, and drain the fat off after blackman. Use cooking methods that require little or no added fat, such as grilling, boiling, baking, poaching, broiling, roasting, steaming, stir-frying, and sauting. Avoid fast food and convenience food. They tend to be high in saturated and trans fat and have a lot of added salt. Talk to a registered dietitian for individualized diet advice. Last Reviewed: March 2011 Alvaro Vazquez MS, MPH, RD   Updated: 3/29/2011   ·     Heart-Healthy Diet   Sodium, Fat, and Cholesterol Controlled Diet       What Is a Heart Healthy Diet?    A heart-healthy diet is one that limits sodium , certain types of fat , and cholesterol . This type of diet is recommended for:   People with any form of cardiovascular disease (eg, coronary heart disease , peripheral vascular disease , previous heart attack , previous stroke )   People with risk factors for cardiovascular disease, such as high blood pressure , high cholesterol , or diabetes   Anyone who wants to lower their risk of developing cardiovascular disease   Sodium    Sodium is a mineral found in many foods. In general, most people consume much more sodium than they need. Diets high in sodium can increase blood pressure and lead to edema (water retention). On a heart-healthy diet, you should consume no more than 2,300 mg (milligrams) of sodium per dayabout the amount in one teaspoon of table salt. The foods highest in sodium include table salt (about 50% sodium), processed foods, convenience foods, and preserved foods. Cholesterol    Cholesterol is a fat-like, waxy substance in your blood. Our bodies make some cholesterol. It is also found in animal products, with the highest amounts in fatty meat, egg yolks, whole milk, cheese, shellfish, and organ meats. On a heart-healthy diet, you should limit your cholesterol intake to less than 200 mg per day. It is normal and important to have some cholesterol in your bloodstream. But too much cholesterol can cause plaque to build up within your arteries, which can eventually lead to a heart attack or stroke. The two types of cholesterol that are most commonly referred to are:   Low-density lipoprotein (LDL) cholesterol  Also known as bad cholesterol, this is the cholesterol that tends to build up along your arteries. Bad cholesterol levels are increased by eating fats that are saturated or hydrogenated. Optimal level of this cholesterol is less than 100. Over 130 starts to get risky for heart disease.    High-density lipoprotein (HDL) cholesterol  Also known as good cholesterol, this type of cholesterol actually carries cholesterol away from your arteries and may, therefore, help lower your risk of having a heart attack. You want this level to be high (ideally greater than 60). It is a risk to have a level less than 40. You can raise this good cholesterol by eating olive oil, canola oil, avocados, or nuts. Exercise raises this level, too. Fat    Fat is calorie dense and packs a lot of calories into a small amount of food. Even though fats should be limited due to their high calorie content, not all fats are bad. In fact, some fats are quite healthful. Fat can be broken down into four main types. The good-for-you fats are:   Monounsaturated fat  found in oils such as olive and canola, avocados, and nuts and natural nut butters; can decrease cholesterol levels, while keeping levels of HDL cholesterol high   Polyunsaturated fat  found in oils such as safflower, sunflower, soybean, corn, and sesame; can decrease total cholesterol and LDL cholesterol   Omega-3 fatty acids  particularly those found in fatty fish (such as salmon, trout, tuna, mackerel, herring, and sardines); can decrease risk of arrhythmias, decrease triglyceride levels, and slightly lower blood pressure   The fats that you want to limit are:   Saturated fat  found in animal products, many fast foods, and a few vegetables; increases total blood cholesterol, including LDL levels   Animal fats that are saturated include: butter, lard, whole-milk dairy products, meat fat, and poultry skin   Vegetable fats that are saturated include: hydrogenated shortening, palm oil, coconut oil, cocoa butter   Hydrogenated or trans fat  found in margarine and vegetable shortening, most shelf stable snack foods, and fried foods; increases LDL and decreases HDL     It is generally recommended that you limit your total fat for the day to less than 30% of your total calories.  If you follow an 1800-calorie heart healthy diet, for example, this would mean 60 grams of fat or less per day. Saturated fat and trans fat in your diet raises your blood cholesterol the most, much more than dietary cholesterol does. For this reason, on a heart-healthy diet, less than 7% of your calories should come from saturated fat and ideally 0% from trans fat. On an 1800-calorie diet, this translates into less than 14 grams of saturated fat per day, leaving 46 grams of fat to come from mono- and polyunsaturated fats.    Food Choices on a Heart Healthy Diet   Food Category   Foods Recommended   Foods to Avoid   Grains   Breads and rolls without salted tops Most dry and cooked cereals Unsalted crackers and breadsticks Low-sodium or homemade breadcrumbs or stuffing All rice and pastas   Breads, rolls, and crackers with salted tops High-fat baked goods (eg, muffins, donuts, pastries) Quick breads, self-rising flour, and biscuit mixes Regular bread crumbs Instant hot cereals Commercially prepared rice, pasta, or stuffing mixes   Vegetables   Most fresh, frozen, and low-sodium canned vegetables Low-sodium and salt-free vegetable juices Canned vegetables if unsalted or rinsed   Regular canned vegetables and juices, including sauerkraut and pickled vegetables Frozen vegetables with sauces Commercially prepared potato and vegetable mixes   Fruits   Most fresh, frozen, and canned fruits All fruit juices   Fruits processed with salt or sodium   Milk   Nonfat or low-fat (1%) milk Nonfat or low-fat yogurt Cottage cheese, low-fat ricotta, cheeses labeled as low-fat and low-sodium   Whole milk Reduced-fat (2%) milk Malted and chocolate milk Full fat yogurt Most cheeses (unless low-fat and low salt) Buttermilk (no more than 1 cup per week)   Meats and Beans   Lean cuts of fresh or frozen beef, veal, lamb, or pork (look for the word loin) Fresh or frozen poultry without the skin Fresh or frozen fish and some shellfish Egg whites and egg substitutes (Limit whole eggs to three per week) Tofu Nuts or seeds (unsalted, dry-roasted), low-sodium peanut butter Dried peas, beans, and lentils   Any smoked, cured, salted, or canned meat, fish, or poultry (including lea, chipped beef, cold cuts, hot dogs, sausages, sardines, and anchovies) Poultry skins Breaded and/or fried fish or meats Canned peas, beans, and lentils Salted nuts   Fats and Oils   Olive oil and canola oil Low-sodium, low-fat salad dressings and mayonnaise   Butter, margarine, coconut and palm oils, lea fat   Snacks, Sweets, and Condiments   Low-sodium or unsalted versions of broths, soups, soy sauce, and condiments Pepper, herbs, and spices; vinegar, lemon, or lime juice Low-fat frozen desserts (yogurt, sherbet, fruit bars) Sugar, cocoa powder, honey, syrup, jam, and preserves Low-fat, trans-fat free cookies, cakes, and pies Danilo and animal crackers, fig bars, moe snaps   High-fat desserts Broth, soups, gravies, and sauces, made from instant mixes or other high-sodium ingredients Salted snack foods Canned olives Meat tenderizers, seasoning salt, and most flavored vinegars   Beverages   Low-sodium carbonated beverages Tea and coffee in moderation Soy milk   Commercially softened water   Suggestions   Make whole grains, fruits, and vegetables the base of your diet. Choose heart-healthy fats such as canola, olive, and flaxseed oil, and foods high in heart-healthy fats, such as nuts, seeds, soybeans, tofu, and fish. Eat fish at least twice per week; the fish highest in omega-3 fatty acids and lowest in mercury include salmon, herring, mackerel, sardines, and canned chunk light tuna. If you eat fish less than twice per week or have high triglycerides, talk to your doctor about taking fish oil supplements. Read food labels. For products low in fat and cholesterol, look for fat free, low-fat, cholesterol free, saturated fat free, and trans fat freeAlso scan the Nutrition Facts Label, which lists saturated fat, trans fat, and cholesterol amounts. For products low in sodium, look for sodium free, very low sodium, low sodium, no added salt, and unsalted   Skip the salt when cooking or at the table; if food needs more flavor, get creative and try out different herbs and spices. Garlic and onion also add substantial flavor to foods. Trim any visible fat off meat and poultry before cooking, and drain the fat off after blackman. Use cooking methods that require little or no added fat, such as grilling, boiling, baking, poaching, broiling, roasting, steaming, stir-frying, and sauting. Avoid fast food and convenience food. They tend to be high in saturated and trans fat and have a lot of added salt. Talk to a registered dietitian for individualized diet advice. Last Reviewed: March 2011 Branden Bobby MS, MPH, RD   Updated: 3/29/2011   ·     High-Fiber Diet     What Is Fiber? Dietary fiber is a form of carbohydrate found in plants that cannot be digested by humans. All plants contain fiber, including fruits, vegetables, grains, and legumes. Fiber is often classified into two categories: soluble and insoluble. Soluble fiber draws water into the bowel and can help slow digestion. Examples of foods that are high in soluble fiber include oatmeal, oat bran, barley, legumes (eg, beans and peas), apples, and strawberries. Insoluble fiber speeds digestion and can add bulk to the stool. Examples of foods that are high in insoluble fiber include whole-wheat products, wheat bran, cauliflower, green beans, and potatoes. Why Follow a High-Fiber Diet? A high-fiber diet is often recommended to prevent and treat constipation , hemorrhoids , diverticulitis , and irritable bowel syndrome . Eating a high-fiber diet can also help improve your cholesterol levels, lower your risk of coronary heart disease , reduce your risk of type 2 diabetes , and lower your weight.  For people with type 1 or 2 diabetes, a high-fiber diet can also help stabilize blood sugar levels. How Much Fiber Should I Eat? A high-fiber diet should contain  20-35 grams  of fiber a day. This is actually the amount recommended for the general adult population; however, most Americans eat only 15 grams of fiber per day. Digestion of Fiber   Eating a higher fiber diet than usual can take some getting used to by your body's digestive system. To avoid the side effects of sudden increases in dietary fiber (eg, gas, cramping, bloating, and diarrhea), increase fiber gradually and be sure to drink plenty of fluids every day. Tips for Increasing Fiber Intake   Whenever possible, choose whole grains over refined grains (eg, brown rice instead of white rice, whole-wheat bread instead of white bread). Include a variety of grains in your diet, such as wheat, rye, barley, oats, quinoa, and bulgur. Eat more vegetarian-based meals. Here are some ideas: black bean burgers, eggplant lasagna, and veggie tofu stir-castillo. Choose high-fiber snacks, such as fruits, popcorn, whole-grain crackers, and nuts. Make whole-grain cereal or whole-grain toast part of your daily breakfast regime. When eating out, whether ordering a sandwich or dinner, ask for extra vegetables. When baking, replace part of the white flour with whole-wheat flour. Whole-wheat flour is particularly easy to incorporate into a recipe. High-Fiber Diet Eating Guide   Food Category   Foods Recommended   Notes   Grains   Whole-grain breads, muffins, bagels, or flex bread Rye bread Whole-wheat crackers or crisp breads Whole-grain or bran cereals Oatmeal, oat bran, or grits Wheat germ Whole-wheat pasta and brown rice   Read the ingredients list on food labels. Look for products that list \"whole\" as the first ingredient (eg, whole-wheat, whole oats). Choose cereals with at least 2 grams of fiber per serving.    Vegetables   All vegetables, especially asparagus, bean sprouts, broccoli, [place] sprouts, cabbage, carrots, cauliflower, celery, corn, greens, green beans, green pepper, onions, peas, potatoes (with skin), snow peas, spinach, squash, sweet potatoes, tomatoes, zucchini   For maximum fiber intake, eat the peels of fruits and vegetablesjust be sure to wash them well first.   Fruits   All fruits, especially apples, berries, grapefruits, mangoes, nectarines, oranges, peaches, pears, dried fruits (figs, dates, prunes, raisins)   Choose raw fruits and vegetables over juice, cooked, or cannedraw fruit has more fiber. Dried fruit is also a good source of fiber. Milk   With the exception of yogurt containing inulin (a type of fiber), dairy foods provide little fiber. Add more fiber by topping your yogurt or cottage cheese with fresh fruit, whole grain or bran cereals, nuts, or seeds. Meats and Beans   All beans and peas, especially Garbanzo beans, kidney beans, lentils, lima beans, split peas, and mccoy beans All nuts and seeds, especially almonds, peanuts, [place] nuts, cashews, peanut butter, walnuts, sesame and sunflower seeds All meat, poultry, fish, and eggs   Increase fiber in meat dishes by adding mccoy beans, kidney beans, black-eyed peas, bran, or oatmeal. If you are following a low-fat diet, use nuts and seeds only in moderation. Fats and Oils   All in moderation   Fats and oils do not provide fiber   Snacks, Sweets, and Condiments   Fruit Nuts Popcorn, whole-wheat pretzels, or trail mix made with dried fruits, nuts, and seeds Cakes, breads, and cookies made with oatmeal or whole-wheat flour   Most snack foods do not provide much fiber. Choose snacks with at least 2 grams of fiber per serving. Last Reviewed: March 2011 Fabio Harris MS, MPH, RD   Updated: 3/29/2011   ·     Keep Your Memory Peter Brandon       Many factors can affect your ability to remembera hectic lifestyle, aging, stress, chronic disease, and certain medicines. But, there are steps you can take to sharpen your mind and help preserve your memory.    Challenge Your Brain   Regularly challenging your mind may help keeps it in top shape. Good mental exercises include:   Crossword puzzlesUse a dictionary if you need it; you will learn more that way. Brainteasers Try some! Crafts, such as wood working and sewing   Hobbies, such as gardening and building model airplanes   SocializingVisit old friends or join groups to meet new ones. Reading   Learning a new language   Taking a class, whether it be art history or jacquelin chi   TravelingExperience the food, history, and culture of your destination   Learning to use a computer   Going to museums, the theater, or thought-provoking movies   Changing things in your daily life, such as reversing your pattern in the grocery store or brushing your teeth using your nondominant hand   Use Memory Aids   There is no need to remember every detail on your own. These memory aids can help:   Calendars and day planners   Electronic organizers to store all sorts of helpful informationThese devices can \"beep\" to remind you of appointments. A book of days to record birthdays, anniversaries, and other occasions that occur on the same date every year   Detailed \"to-do\" lists and strategically placed sticky notes   Quick \"study\" sessionsBefore a gathering, review who will be there so their names will be fresh in your mind. Establish routinesFor example, keep your keys, wallet, and umbrella in the same place all the time or take medicine with your 8:00 AM glass of juice   Live a Healthy Life   Many actions that will keep your body strong will do the same for your mind. For example:   Talk to Your Doctor About Herbs and Supplements    Malnutrition and vitamin deficiencies can impair your mental function. For example, vitamin B12 deficiency can cause a range of symptoms, including confusion. But, what if your nutritional needs are being met? Can herbs and supplements still offer a benefit?  Researchers have investigated a range of natural remedies, such agility or sensory awareness   Side effects of medicine (eg, dizziness, blurred vision)especially medicines like prescription pain medicines and drugs used to treat mental health conditions   Depending on the brittleness of your bones, the consequences of a fall can be serious and long lasting. Home Life   Research by the Association of Aging [facility]) shows that some home accidents among older adults can be prevented by making simple lifestyle changes and basic modifications and repairs to the home environment. Here are some lifestyle changes that experts recommend:   Have your hearing and vision checked regularly. Be sure to wear prescription glasses that are right for you. Speak to your doctor or pharmacist about the possible side effects of your medicines. A number of medicines can cause dizziness. If you have problems with sleep, talk to your doctor. Limit your intake of alcohol. If necessary, use a cane or walker to help maintain your balance. Wear supportive, rubber-soled shoes, even at home. If you live in a region that gets wintry weather, you may want to put special cleats on your shoes to prevent you from slipping on the snow and ice. Exercise regularly to help maintain muscle tone, agility, and balance. Always hold the banister when going up or down stairs. Also, use  bars when getting in or out of the bath or shower, or using the toilet. To avoid dizziness, get up slowly from a lying down position. Sit up first, dangling your legs for a minute or two before rising to a standing position. Overall Home Safety Check   According to the Consumer Product Safety Commision's \"Older Consumer Home Safety Checklist,\" it is important to check for potential hazards in each room. And remember, proper lighting is an essential factor in home safety. If you cannot see clearly, you are more likely to fall.    Important questions to ask yourself include:   Are lamp, electric, extension, and telephone cords placed out of the flow of traffic and maintained in good condition? Have frayed cords been replaced? Are all small rugs and runners slip resistant? If not, you can secure them to the floor with a special double-sided carpet tape. Are smoke detectors properly locatedone on every floor of your home and one outside of every sleeping area? Are they in good working order? Are batteries replaced at least once a year? Do you have a well-maintained carbon monoxide detector outside every sleeping are in your home? Does your furniture layout leave plenty of space to maneuver between and around chairs, tables, beds, and sofas? Are hallways, stairs and passages between rooms well lit? Can you reach a lamp without getting out of bed? Are floor surfaces well maintained? Shag rugs, high-pile carpeting, tile floors, and polished wood floors can be particularly slippery. Stairs should always have handrails and be carpeted or fitted with a non-skid tread. Is your telephone easily reachable. Is the cord safely tucked away? Room by Room   According to the Association of Aging, bathrooms and poli are the two most potentially hazardous rooms in your home. In the Kitchen    Be sure your stove is in proper working order and always make sure burners and the oven are off before you go out or go to sleep. Keep pots on the back burners, turn handles away from the front of the stove, and keep stove clean and free of grease build-up. Kitchen ventilation systems and range exhausts should be working properly. Keep flammable objects such as towels and pot holders away from the cooking area except when in use. Make sure kitchen curtains are tied back. Move cords and appliances away from the sink and hot surfaces. If extension cords are needed, install wiring guides so they do not hang over the sink, range, or working areas. Look for coffee pots, kettles and toaster ovens with automatic shut-offs.     Keep a mop handy in the kitchen so you can wipe up spills instantly. You should also have a small fire extinguisher. Arrange your kitchen with frequently used items on lower shelves to avoid the need to stand on a stepstool to reach them. Make sure countertops are well-lit to avoid injuries while cutting and preparing food. In the Bathroom    Use a non-slip mat or decals in the tub and shower, since wet, soapy tile or porcelain surfaces are extremely slippery. Make sure bathroom rugs are non-skid or tape them firmly to the floor. Bathtubs should have at least one, preferably two, grab bars, firmly attached to structural supports in the wall. (Do not use built-in soap holders or glass shower doors as grab bars.)    Tub seats fitted with non-slip material on the legs allow you to wash sitting down. For people with limited mobility, bathtub transfer benches allow you to slide safely into the tub. Raised toilet seats and toilet safety rails are helpful for those with knee or hip problems. In the [facility]    Make sure you use a nightlight and that the area around your bed is clear of potential obstacles. Be careful with electric blankets and never go to sleep with a heating pad, which can cause serious burns even if on a low setting. Use fire-resistant mattress covers and pillows, and NEVER smoke in bed. Keep a phone next to the bed that is programmed to dial 911 at the push of a button. If you have a chronic condition, you may want to sign on with an automatic call-in service. Typically the system includes a small pendant that connects directly to an emergency medical voice-response system. You should also make arrangements to stay in contact with someonefriend, neighbor, family memberon a regular schedule.    Fire Prevention   According to the [employer]. (Smoke Alarms for Every) [address] [place] [address], senior citizens are one of the two highest risk groups for death and serious injuries due to final days less stressful and more meaningful. Follow-up care is a key part of your treatment and safety. Be sure to make and go to all appointments, and call your doctor if you are having problems. It's also a good idea to know your test results and keep a list of the medicines you take. What can you do to plan for the end of life? You can bring these issues up with your doctor. You do not need to wait until your doctor starts the conversation. You might start with, \"What makes life worth living for me is. Bettey Grad Bettey Grad \" And then follow it with, \"I would not be willing to live with . Bettey Grad Bettey Grad Bettey Grad \" When you complete this sentence it helps your doctor understand your wishes. Talk openly and honestly with your doctor. This is the best way to understand the decisions you will need to make as your health changes. Know that you can always change your mind. Ask your doctor about commonly used life-support measures. These include tube feedings, breathing machines, and fluids given through a vein (IV). Understanding these treatments will help you decide whether you want them. You may choose to have these life-supporting treatments for a limited time. This allows a trial period to see whether they will help you. You may also decide that you want your doctor to take only certain measures to keep you alive. It may help to think about the big picture, like what makes life worth living for you or what your values and goals are. Talk to your doctor about how long you are likely to live. Your doctor may be able to give you an idea of what usually happens with your specific illness. Think about preparing papers that state your wishes. These papers are called advance directives. If you do this early and review them often, there will not be any confusion about what you want. You can change your instructions at any time. Which papers should you prepare?   Advance directives are legal papers that tell doctors how you want to be cared for at the end of your life. You do not need a  to write these papers. Ask your doctor or your state health department for information on how to write your advance directives. They may have the forms for each of these types of papers. Make sure your doctor has a copy of these on file, and give a copy to a family member or close friend. Consider a do-not-resuscitate order (DNR). This order asks that no extra treatments be done if your heart stops or you stop breathing. Extra treatments may include cardiopulmonary resuscitation (CPR), electrical shock to restart your heart, or a machine to breathe for you. If you decide to have a DNR order, ask your doctor to explain and write it. Place the order in your home where everyone can easily see it. Consider a living will. A living will explains your wishes about life support and other treatments at the end of your life if you become unable to speak for yourself. Living mariee tell doctors to use or not use treatments that would keep you alive. You must have one or two witnesses or a notary present when you sign this form. A living will may be called something else in your state. Consider a medical power of . This form allows you to name a person to make decisions about your care if you are not able to. Most people ask a close friend or family member. Talk to this person about the kinds of treatments you want and those that you do not want. Make sure this person understands your wishes. A medical power of  may be called something else in your state. These legal papers are simple to change. Tell your doctor what you want to change, and ask him or her to make a note in your medical file. Give your family updated copies of the papers. Where can you learn more? Go to https://dirk.Renrenmoney. org and sign in to your [employer] account. Enter P184 in the [facility] box to learn more about \"Advance Care Planning: Care Instructions. \"     If you do not have an account, please click on the \"Sign Up Now\" link. Current as of: July 17, 2020               Content Version: 12.8  © 2006-2021 Healthwise, [employer]. Care instructions adapted under license by [facility] ([facility]). If you have questions about a medical condition or this instruction, always ask your healthcare professional. [address] any warranty or liability for your use of this information. ·        Learning About Hussein Mackay  What is a living will? A living will, also called a declaration, is a legal form. It tells your family and your doctor your wishes when you can't speak for yourself. It's used by the health professionals who will treat you as you near the end of your life or if you get seriously hurt or ill. If you put your wishes in writing, your loved ones and others will know what kind of care you want. They won't need to guess. This can ease your mind and be helpful to others. And you can change or cancel your living will at any time. A living will is not the same as an estate or property will. An estate will explains what you want to happen with your money and property after you die. How do you use it? A living will is used to describe the kinds of treatment or life support you want as you near the end of your life or if you get seriously hurt or ill. Keep these facts in mind about living mariee. Your living will is used only if you can't speak or make decisions for yourself. Most often, one or more doctors must certify that you can't speak or decide for yourself before your living will takes effect. If you get better and can speak for yourself again, you can accept or refuse any treatment. It doesn't matter what you said in your living will. Some states may limit your right to refuse treatment in certain cases.  For example, you may need to clearly state in your living will that you don't want artificial hydration and nutrition, such as being fed through a tube.  Is a living will a legal document? A living will is a legal document. Each state has its own laws about living mariee. And a living will may be called something else in your state. Here are some things to know about living mariee. You don't need an  to complete a living will. But legal advice can be helpful if your state's laws are unclear. It can also help if your health history is complicated or your family can't agree on what should be in your living will. You can change your living will at any time. Some people find that their wishes about end-of-life care change as their health changes. If you make big changes to your living will, complete a new form. If you move to another state, make sure that your living will is legal in the state where you now live. In most cases, doctors will respect your wishes even if you have a form from a different state. You might use a universal form that has been approved by many states. This kind of form can sometimes be filled out and stored online. Your digital copy will then be available wherever you have a connection to the internet. The doctors and nurses who need to treat you can find it right away. Your state may offer an online registry. This is another place where you can store your living will online. It's a good idea to get your living will notarized. This means using a person called a  to watch two people sign, or witness, your living will. What should you know when you create a living will? Here are some questions to ask yourself as you make your living will:  Do you know enough about life support methods that might be used? If not, talk to your doctor so you know what might be done if you can't breathe on your own, your heart stops, or you can't swallow. What things would you still want to be able to do after you receive life-support methods? Would you want to be able to walk? To speak? To eat on your own?  To live without the help of machines? Do you want certain Spiritism practices performed if you become very ill? If you have a choice, where do you want to be cared for? In your home? At a hospital or nursing home? If you have a choice at the end of your life, where would you prefer to die? At home? In a hospital or nursing home? Somewhere else? Would you prefer to be buried or cremated? Do you want your organs to be donated after you die? What should you do with your living will? Make sure that your family members and your health care agent have copies of your living will (also called a declaration). Give your doctor a copy of your living will. Ask him or her to keep it as part of your medical record. If you have more than one doctor, make sure that each one has a copy. Put a copy of your living will where it can be easily found. For example, some people may put a copy on their refrigerator door. If you are using a digital copy, be sure your doctor, family members, and health care agent know how to find and access it. Where can you learn more? Go to https://Diagnostic BiochipspepicewPhorm.Gungroo. org and sign in to your [employer] account. Enter N133 in the [employer] box to learn more about \"Learning About Living Perroy. \"     If you do not have an account, please click on the \"Sign Up Now\" link. Current as of: July 17, 2020               Content Version: 12.8  © 7569-9739 Healthwise, [employer]. Care instructions adapted under license by [facility] ([facility]). If you have questions about a medical condition or this instruction, always ask your healthcare professional. [address] any warranty or liability for your use of this information. ·        Learning About Medical Power of   What is a medical power of ? A medical power of , also called a durable power of  for health care, is one type of the legal forms called advance directives.  It lets you name the person you want for you. How do you name a health care agent? You name your health care agent on a legal form. This form is usually called a medical power of . Ask your hospital, state bar association, or office on aging where to find these forms. You must sign the form to make it legal. Some states require you to get the form notarized. This means that a person called a  watches you sign the form and then he or she signs the form. Some states also require that two or more witnesses sign the form. Be sure to tell your family members and doctors who your health care agent is. Where can you learn more? Go to https://Health: Eltpepiceweb.[employer]. org and sign in to your [employer] account. Enter [id number] in the [employer] box to learn more about \"Learning About [address]. \"     If you do not have an account, please click on the \"Sign Up Now\" link. Current as of: July 17, 2020               Content Version: 12.8  © 0139-9777 Healthwise, Incorporated. Care instructions adapted under license by [facility] ([facility]). If you have questions about a medical condition or this instruction, always ask your healthcare professional. Cecilia Rosario any warranty or liability for your use of this information.     ·

## 2021-06-01 NOTE — PROGRESS NOTES
2021     Henrique Valdez      Chief Complaint   Patient presents with    3 Month Follow-Up    Other     pt reports no concerns       HPI      Last Mireles is a 72 y.o. male who presents today with the followin. Type 2 diabetes mellitus with hyperosmolarity without coma, without long-term current use of insulin ([address]    2. PSVT 2020 and 21    He is here for follow-up  He was switched from [employer] to [address] there was some question whether Trulicity had caused an arrhythmia  It can cause prolonged QT and the patient had been hospitalized  He thinks his sugars been pretty good since he switched over  He has had no palpitations  Cardiology said they could just wait and see if he had any more spells after he was off the Trulicity before containing him on a calcium channel blocker  REVIEW OF SYMPTOMS    Review of Systems    PAST MEDICAL HISTORY  Past Medical History:   Diagnosis Date    H/O echocardiogram 10/06/2020    EF55-60%, Mild TR & LVH.     History of exercise stress test 10/06/2020    Treadmill, Normal    Hyperlipidemia     Hypertension     PSVT (paroxysmal supraventricular tachycardia) ([address] 2020 [facility]    Type 2 diabetes mellitus ([address]        FAMILY HISTORY  Family History   Problem Relation Age of Onset    Diabetes Mother     High Cholesterol Mother     Heart Disease Mother     Cancer Father     Diabetes Sister     High Blood Pressure Sister     High Cholesterol Sister        SOCIAL HISTORY  Social History     Socioeconomic History    Marital status: Single     Spouse name: None    Number of children: None    Years of education: None    Highest education level: None   Occupational History    None   Tobacco Use    Smoking status: Never Smoker    Smokeless tobacco: Never Used   Vaping Use    Vaping Use: Never used   Substance and Sexual Activity    Alcohol use: Not Currently     Comment: 2-3 beers per year/caffeine 2 cups of coffee a week and 4 pops a week    Drug use: No    Sexual activity: None   Other Topics Concern    None   Social History Narrative    None     Social Determinants of Health     Financial Resource Strain:     Difficulty of Paying Living Expenses:    Food Insecurity:     Worried About Running Out of Food in the Last Year:     [address] in the Last Year:    Transportation Needs:     Lack of Transportation (Medical):  Lack of Transportation (Non-Medical):    Physical Activity:     Days of Exercise per Week:     Minutes of Exercise per Session:    Stress:     Feeling of Stress :    Social Connections:     Frequency of Communication with Friends and Family:     Frequency of Social Gatherings with Friends and Family:     Attends Anab[employer] Services:     Active Member of Clubs or Organizations:     Attends Club or Organization Meetings:     Marital Status:    Intimate Partner Violence:     Fear of Current or Ex-Partner:     Emotionally Abused:     Physically Abused:     Sexually Abused:         SURGICAL HISTORY  Past Surgical History:   Procedure Laterality Date    COLONOSCOPY  04/19/2021    COLONOSCOPY N/A 4/19/2021    COLONOSCOPY POLYPECTOMY SNARE/COLD BIOPSY performed by Carroll Mesa MD at [address] [place] [address]  Current Outpatient Medications   Medication Sig Dispense Refill    JANUVIA 100 MG tablet take 1 tablet by mouth once daily 90 tablet 1    Blood Glucose Monitoring Suppl (FREESTYLE LITE) MELODY use as directed 1 Device 0    aspirin 81 MG EC tablet Take 1 tablet by mouth daily 30 tablet 3    atorvastatin (LIPITOR) 20 MG tablet Take 1 tablet by mouth daily 90 tablet 1    ibuprofen (ADVIL;MOTRIN) 800 MG tablet Take 1 tablet by mouth every 6 hours as needed for Pain 120 tablet 0    blood glucose test strips (FREESTYLE LITE) strip 1 each by In Vitro route daily As needed.  100 each 3    ONETOUCH VERIO strip TEST TWICE DAILY AND AS NEEDED 100 strip 5    metFORMIN (GLUCOPHAGE-XR) 500 MG extended release tablet take 2 tablets by mouth once daily 180 tablet 1    lisinopril (PRINIVIL;ZESTRIL) 10 MG tablet take 1 tablet by mouth once daily 90 tablet 1    ONETOUCH DELICA LANCETS FINE MISC by Does not apply route      Glucose Blood (ONETOUCH VERIO VI) by In Vitro route      dilTIAZem (CARDIZEM CD) 120 MG extended release capsule Take 1 capsule by mouth daily (Patient not taking: Reported on 6/1/2021) 30 capsule 3    polyethylene glycol (MIRALAX) 17 GM/SCOOP powder Take 17 g by mouth daily (Patient not taking: Reported on 6/1/2021) 510 g 3     No current facility-administered medications for this visit. ALLERGIES  No Known Allergies    PHYSICAL EXAM    /70 (Site: Right Upper Arm, Position: Sitting, Cuff Size: Medium Adult)   Pulse 80   Ht 6' 3\" (1.905 m)   Wt 197 lb (89.4 kg)   BMI 24.62 kg/m²     Physical Exam  Vitals and nursing note reviewed. Constitutional:       Appearance: Normal appearance. Cardiovascular:      Rate and Rhythm: Normal rate. Pulmonary:      Effort: Pulmonary effort is normal.     In office A1c 7.1    ASSESSMENT and Trey Line was seen today for 3 month follow-up and other. Diagnoses and all orders for this visit:    Type 2 diabetes mellitus with hyperosmolarity without coma, without long-term current use of insulin ([facility])  -     POCT glycosylated hemoglobin (Hb A1C)    PSVT 6/2 2020 and 1/20/21    Patient is doing well on current medications would continue the same and same diet  Follow-up in 6 months  Get another A1c then    Return in about 6 months (around 12/1/2021). Electronically signed by Berhane Panchal MD on 6/1/2021    Please note that this chart was generated using dragon dictation software. Although every effort was made to ensure the accuracy of this automated transcription, some errors in transcription may have occurred.

## 2021-06-01 NOTE — PROGRESS NOTES
Medicare Annual Wellness Visit  Name: Evelina Patel Date: 2021   MRN: [id number] Sex: Male   Age: 72 y.o. Ethnicity: Non-/Non    : 1950 Race: Black      Floyd Cottrell is here for Medicare AWV    Screenings for behavioral, psychosocial and functional/safety risks, and cognitive dysfunction are all negative except as indicated below. These results, as well as other patient data from the [address] form, are documented in Flowsheets linked to this Encounter. No Known Allergies    Prior to Visit Medications    Medication Sig Taking? Authorizing Provider   JANUVIA 100 MG tablet take 1 tablet by mouth once daily Yes CLIFF Whelan   Blood Glucose Monitoring Suppl (FREESTYLE LITE) MELODY use as directed Yes Marcelo Bradford MD   aspirin 81 MG EC tablet Take 1 tablet by mouth daily Yes Marlen Puente MD   atorvastatin (LIPITOR) 20 MG tablet Take 1 tablet by mouth daily Yes Marlen Puente MD   ibuprofen (ADVIL;MOTRIN) 800 MG tablet Take 1 tablet by mouth every 6 hours as needed for Pain Yes Mabel Faulkner PA-C   blood glucose test strips (FREESTYLE LITE) strip 1 each by In Vitro route daily As needed.  Yes Marcelo Bradford MD   [facility] VERIO strip TEST TWICE DAILY AND AS NEEDED Yes Marcelo Bradford MD   metFORMIN (GLUCOPHAGE-XR) 500 MG extended release tablet take 2 tablets by mouth once daily Yes Marcelo Bradford MD   lisinopril (PRINIVIL;ZESTRIL) 10 MG tablet take 1 tablet by mouth once daily Yes Marcelo Bradford MD   [facility] DELICA LANCETS FINE MISC by Does not apply route Yes Historical Provider, MD   Glucose Blood (ONETOUCH VERIO VI) by In Vitro route Yes Historical Provider, MD   dilTIAZem (CARDIZEM CD) 120 MG extended release capsule Take 1 capsule by mouth daily  Patient not taking: Reported on 2021  Marlen Puente MD   polyethylene glycol (MIRALAX) 17 GM/SCOOP powder Take 17 g by mouth daily  Patient not taking: Reported on 2021  Ashley Colin Madison Marks, APRN - CNP       Past Medical History:   Diagnosis Date    H/O echocardiogram 10/06/2020    EF55-60%, Mild TR & LVH.  History of exercise stress test 10/06/2020    Treadmill, Normal    Hyperlipidemia     Hypertension     PSVT (paroxysmal supraventricular tachycardia) [facility] 9/24/2020 [facility]    Type 2 diabetes mellitus [facility])        Past Surgical History:   Procedure Laterality Date    COLONOSCOPY  04/19/2021    COLONOSCOPY N/A 4/19/2021    COLONOSCOPY POLYPECTOMY SNARE/COLD BIOPSY performed by Justo Pineda MD at [facility] ENDOSCOPY       Family History   Problem Relation Age of Onset    Diabetes Mother     High Cholesterol Mother     Heart Disease Mother     Cancer Father     Diabetes Sister     High Blood Pressure Sister     High Cholesterol Sister        CareTeam (Including outside providers/suppliers regularly involved in providing care):   Patient Care Team:  Ilya Davies MD as PCP - General (Family Medicine)  Ilya Davies MD as PCP - [facility] Provider    Wt Readings from Last 3 Encounters:   06/01/21 197 lb (89.4 kg)   06/01/21 197 lb (89.4 kg)   04/26/21 205 lb 12.8 oz (93.4 kg)     Vitals:    06/01/21 1452   BP: 116/70   Pulse: 80   Temp: 98.3 °F (36.8 °C)   SpO2: 95%   Weight: 197 lb (89.4 kg)   Height: 6' 3\" (1.905 m)     Body mass index is 24.62 kg/m². Based upon direct observation of the patient, evaluation of cognition reveals recent and remote memory intact. Patient's complete Health Risk Assessment and screening values have been reviewed and are found in Flowsheets. The following problems were reviewed today and where indicated follow up appointments were made and/or referrals ordered. Positive Risk Factor Screenings with Interventions:          General Health and ACP:  General  In general, how would you say your health is?: Very Good  In the past 7 days, have you experienced any of the following?  New or Increased Pain, New or Increased Fatigue, Loneliness, Social Isolation, Stress or Anger?: (!) Loneliness  Do you get the social and emotional support that you need?: Yes  Do you have a Living Will?: (!) No  Advance Directives     Power of  Living Will ACP-Advance Directive ACP-Power of     Not on File Not on File Not on File Not on File      General Health Risk Interventions:  · Loneliness: patient's comments regarding inadequate social support: Patient states he has had several family members pass away over the last 6 months, states he has learned to deal with all of it, declines any help  · No Living Will: Advance Care Planning addressed with patient today, documents provided to patient    Health Habits/Nutrition:  Health Habits/Nutrition  Do you exercise for at least 20 minutes 2-3 times per week?: (!) No  Have you lost any weight without trying in the past 3 months?: No  Do you eat only one meal per day?: No  Have you seen the dentist within the past year?: Yes  Body mass index: 24.62  Health Habits/Nutrition Interventions:  · Inadequate physical activity:  educational materials provided to promote increased physical activity       Personalized Preventive Plan   Current Health Maintenance Status  Immunization History   Administered Date(s) Administered    COVID-19, Pfizer, PF, 30mcg/0.3mL 05/27/2021    Influenza, High Dose (Fluzone 65 yrs and older) 09/01/2016, 09/12/2017    Influenza, Triv, inactivated, subunit, adjuvanted, IM (Fluad 65 yrs and older) 10/21/2019    Pneumococcal Conjugate 13-valent (Gwenyth Plenty) 09/13/2019    Tdap (Boostrix, Adacel) 10/01/2016        Health Maintenance   Topic Date Due    Diabetic retinal exam  Never done    Shingles Vaccine (1 of 2) Never done    Diabetic foot exam  09/13/2020    Diabetic microalbuminuria test  09/13/2020    Pneumococcal 65+ years Vaccine (2 of 2 - PPSV23) 09/13/2020    COVID-19 Vaccine (2 - Pfizer 2-dose series) 06/17/2021    Flu vaccine (Season Ended) 09/01/2021    Lipid screen  02/20/2022    Potassium monitoring  02/21/2022    Creatinine monitoring  02/21/2022    A1C test (Diabetic or Prediabetic)  06/01/2022    DTaP/Tdap/Td vaccine (2 - Td) 10/01/2026    Colon cancer screen colonoscopy  04/19/2031    Hepatitis A vaccine  Aged Out    Hib vaccine  Aged Out    Meningococcal (ACWY) vaccine  Aged Out    Hepatitis C screen  Discontinued     Recommendations for Involver Due: see orders and patient instructions/AVS. Patient saw PCP today. .  Recommended screening schedule for the next 5-10 years is provided to the patient in written form: see Patient Instructions/AVS.  This encounter was performed under my, Carina Covarrubias, direct supervision, 6/1/2021. Vesta Morgan LPN, [birth year], performed the documented evaluation under the direct supervision of the attending physician.

## 2021-07-06 RX ORDER — LISINOPRIL 10 MG/1
TABLET ORAL
Qty: 90 TABLET | Refills: 1 | Status: SHIPPED | OUTPATIENT
Start: 2021-07-06 | End: 2021-11-29 | Stop reason: SDUPTHER

## 2021-10-08 RX ORDER — METFORMIN HYDROCHLORIDE 500 MG/1
TABLET, EXTENDED RELEASE ORAL
Qty: 180 TABLET | Refills: 1 | Status: SHIPPED | OUTPATIENT
Start: 2021-10-08 | End: 2022-06-02

## 2021-11-29 ENCOUNTER — OFFICE VISIT (OUTPATIENT)
Dept: FAMILY MEDICINE CLINIC | Age: 71
End: 2021-11-29
Payer: MEDICARE

## 2021-11-29 VITALS
WEIGHT: 207 LBS | HEIGHT: 75 IN | BODY MASS INDEX: 25.74 KG/M2 | OXYGEN SATURATION: 98 % | DIASTOLIC BLOOD PRESSURE: 82 MMHG | RESPIRATION RATE: 16 BRPM | SYSTOLIC BLOOD PRESSURE: 112 MMHG | HEART RATE: 68 BPM

## 2021-11-29 DIAGNOSIS — I47.1 PSVT (PAROXYSMAL SUPRAVENTRICULAR TACHYCARDIA) (HCC): ICD-10-CM

## 2021-11-29 DIAGNOSIS — R42 DIZZINESS AND GIDDINESS: ICD-10-CM

## 2021-11-29 DIAGNOSIS — E11.00 TYPE 2 DIABETES MELLITUS WITH HYPEROSMOLARITY WITHOUT COMA, WITHOUT LONG-TERM CURRENT USE OF INSULIN (HCC): Primary | ICD-10-CM

## 2021-11-29 LAB — HBA1C MFR BLD: 6.9 %

## 2021-11-29 PROCEDURE — G8484 FLU IMMUNIZE NO ADMIN: HCPCS | Performed by: FAMILY MEDICINE

## 2021-11-29 PROCEDURE — 1123F ACP DISCUSS/DSCN MKR DOCD: CPT | Performed by: FAMILY MEDICINE

## 2021-11-29 PROCEDURE — 3044F HG A1C LEVEL LT 7.0%: CPT | Performed by: FAMILY MEDICINE

## 2021-11-29 PROCEDURE — G8427 DOCREV CUR MEDS BY ELIG CLIN: HCPCS | Performed by: FAMILY MEDICINE

## 2021-11-29 PROCEDURE — 4040F PNEUMOC VAC/ADMIN/RCVD: CPT | Performed by: FAMILY MEDICINE

## 2021-11-29 PROCEDURE — 2022F DILAT RTA XM EVC RTNOPTHY: CPT | Performed by: FAMILY MEDICINE

## 2021-11-29 PROCEDURE — 83036 HEMOGLOBIN GLYCOSYLATED A1C: CPT | Performed by: FAMILY MEDICINE

## 2021-11-29 PROCEDURE — 99213 OFFICE O/P EST LOW 20 MIN: CPT | Performed by: FAMILY MEDICINE

## 2021-11-29 PROCEDURE — 3017F COLORECTAL CA SCREEN DOC REV: CPT | Performed by: FAMILY MEDICINE

## 2021-11-29 PROCEDURE — G8417 CALC BMI ABV UP PARAM F/U: HCPCS | Performed by: FAMILY MEDICINE

## 2021-11-29 PROCEDURE — 1036F TOBACCO NON-USER: CPT | Performed by: FAMILY MEDICINE

## 2021-11-29 RX ORDER — ASPIRIN 81 MG/1
81 TABLET ORAL DAILY
Qty: 30 TABLET | Refills: 3 | Status: SHIPPED | OUTPATIENT
Start: 2021-11-29

## 2021-11-29 RX ORDER — LISINOPRIL 10 MG/1
TABLET ORAL
Qty: 90 TABLET | Refills: 1 | Status: SHIPPED | OUTPATIENT
Start: 2021-11-29 | End: 2022-06-02

## 2021-11-29 NOTE — PROGRESS NOTES
2021     Tarah Wise      Chief Complaint   Patient presents with    6 Month Follow-Up     Presenting for 6 mo f/u visit. HPI Darleen Siemens is a 80 y.o. male who presents today with the followin. Type 2 diabetes mellitus with hyperosmolarity without coma, without long-term current use of insulin ([address]    2. PSVT 2020 and 21    3. Dizziness and giddiness      He complains of being off balance when he first stands up  He had a diagnosis of vertigo in the past and so it sounds like that although he might be having a dip in his blood pressure there is been no syncope  He has a history of SVT but that is very well controlled      REVIEW OF SYMPTOMS    Review of Systems   Endocrine:        Diabetes type 2 under good control       PAST MEDICAL HISTORY  Past Medical History:   Diagnosis Date    H/O echocardiogram 10/06/2020    EF55-60%, Mild TR & LVH.     History of exercise stress test 10/06/2020    Treadmill, Normal    Hyperlipidemia     Hypertension     PSVT (paroxysmal supraventricular tachycardia) ([address] 2020 [facility]    Type 2 diabetes mellitus ([address]        FAMILY HISTORY  Family History   Problem Relation Age of Onset    Diabetes Mother     High Cholesterol Mother     Heart Disease Mother     Cancer Father     Diabetes Sister     High Blood Pressure Sister     High Cholesterol Sister        SOCIAL HISTORY  Social History     Socioeconomic History    Marital status: Single     Spouse name: None    Number of children: None    Years of education: None    Highest education level: None   Occupational History    None   Tobacco Use    Smoking status: Never Smoker    Smokeless tobacco: Never Used   Vaping Use    Vaping Use: Never used   Substance and Sexual Activity    Alcohol use: Not Currently     Comment: 2-3 beers per year/caffeine 2 cups of coffee a week and 4 pops a week    Drug use: No    Sexual activity: None   Other Topics Concern    None Social History Narrative    None     Social Determinants of Health     Financial Resource Strain: Low Risk     Difficulty of Paying Living Expenses: Not hard at all   Food Insecurity: No Food Insecurity    Worried About Running Out of Food in the Last Year: Never true    [place]ain of Food in the Last Year: Never true   Transportation Needs:     Lack of Transportation (Medical): Not on file    Lack of Transportation (Non-Medical):  Not on file   Physical Activity:     Days of Exercise per Week: Not on file    Minutes of Exercise per Session: Not on file   Stress:     Feeling of Stress : Not on file   Social Connections:     Frequency of Communication with Friends and Family: Not on file    Frequency of Social Gatherings with Friends and Family: Not on file    Attends Yarsanism Services: Not on file    Active Member of [address] [place] [address] [employer] or Organizations: Not on file    Attends Club or Organization Meetings: Not on file    Marital Status: Not on file   Intimate Partner Violence:     Fear of Current or Ex-Partner: Not on file    Emotionally Abused: Not on file    Physically Abused: Not on file    Sexually Abused: Not on file   Housing Stability:     Unable to Pay for Housing in the Last Year: Not on file    Number of [place] in the Last Year: Not on file    Unstable Housing in the Last Year: Not on file        SURGICAL HISTORY  Past Surgical History:   Procedure Laterality Date    COLONOSCOPY  04/19/2021    COLONOSCOPY N/A 4/19/2021    COLONOSCOPY POLYPECTOMY SNARE/COLD BIOPSY performed by Dahlia Fung MD at [facility] Outpatient Medications   Medication Sig Dispense Refill    aspirin 81 MG EC tablet Take 1 tablet by mouth daily 30 tablet 3    lisinopril (PRINIVIL;ZESTRIL) 10 MG tablet take 1 tablet by mouth once daily 90 tablet 1    metFORMIN (GLUCOPHAGE-XR) 500 MG extended release tablet take 2 tablets by mouth once daily 180 tablet 1    JANUVIA 100 MG tablet take 1 tablet by mouth once daily 90 tablet 1    atorvastatin (LIPITOR) 20 MG tablet Take 1 tablet by mouth daily 90 tablet 1    ibuprofen (ADVIL;MOTRIN) 800 MG tablet Take 1 tablet by mouth every 6 hours as needed for Pain 120 tablet 0    blood glucose test strips (FREESTYLE LITE) strip 1 each by In Vitro route daily As needed. 100 each 3    ONETOUCH VERIO strip TEST TWICE DAILY AND AS NEEDED 100 strip 5    ONETOUCH DELICA LANCETS FINE MISC by Does not apply route      Glucose Blood (ONETOUCH VERIO VI) by In Vitro route       No current facility-administered medications for this visit. ALLERGIES  No Known Allergies    PHYSICAL EXAM    /82 Comment: standing  Pulse 68   Resp 16   Ht 6' 3\" (1.905 m)   Wt 207 lb (93.9 kg)   SpO2 98%   BMI 25.87 kg/m²     Physical Exam  Vitals and nursing note reviewed. Constitutional:       Appearance: Normal appearance. Cardiovascular:      Rate and Rhythm: Normal rate. Pulmonary:      Effort: Pulmonary effort is normal.     Orthostatic blood pressures show a 10 point systolic drop    B5R in the office 6.9    ASSESSMENT and Jennifer Saldivar was seen today for 6 month follow-up. Diagnoses and all orders for this visit:    Type 2 diabetes mellitus with hyperosmolarity without coma, without long-term current use of insulin (HCC)  -     POCT glycosylated hemoglobin (Hb A1C)    PSVT 6/2 2020 and 1/20/21    Dizziness and giddiness    Other orders  -     aspirin 81 MG EC tablet; Take 1 tablet by mouth daily  -     lisinopril (PRINIVIL;ZESTRIL) 10 MG tablet; take 1 tablet by mouth once daily      I think the patient might have some mild orthostatic hypotension he needs to stay on beta-blocker because of his SVT  His diabetes is under good control continue same treatment on that    Recommend third Covid vaccine      Return in about 6 months (around 5/29/2022).           Electronically signed by Hao Diaz MD on 11/29/2021    Please note that this chart was generated using dragon dictation software. Although every effort was made to ensure the accuracy of this automated transcription, some errors in transcription may have occurred.

## 2021-12-21 RX ORDER — METFORMIN HYDROCHLORIDE 500 MG/1
TABLET, EXTENDED RELEASE ORAL
Qty: 180 TABLET | Refills: 1 | OUTPATIENT
Start: 2021-12-21

## 2022-02-04 RX ORDER — ATORVASTATIN CALCIUM 20 MG/1
20 TABLET, FILM COATED ORAL DAILY
Qty: 90 TABLET | Refills: 1 | Status: SHIPPED | OUTPATIENT
Start: 2022-02-04 | End: 2022-06-02

## 2022-05-13 ENCOUNTER — HOSPITAL ENCOUNTER (EMERGENCY)
Age: 72
Discharge: HOME | End: 2022-05-13
Attending: EMERGENCY MEDICINE
Payer: COMMERCIAL

## 2022-05-13 VITALS
WEIGHT: 210 LBS | OXYGEN SATURATION: 98 % | TEMPERATURE: 99 F | RESPIRATION RATE: 11 BRPM | HEIGHT: 75 IN | DIASTOLIC BLOOD PRESSURE: 51 MMHG | SYSTOLIC BLOOD PRESSURE: 110 MMHG | BODY MASS INDEX: 26.11 KG/M2 | HEART RATE: 94 BPM

## 2022-05-13 DIAGNOSIS — I47.1 PAROXYSMAL SUPRAVENTRICULAR TACHYCARDIA (HCC): Primary | ICD-10-CM

## 2022-05-13 LAB
ALBUMIN SERPL-MCNC: 4.2 GM/DL (ref 3.4–5)
ALP BLD-CCNC: 95 IU/L (ref 40–128)
ALT SERPL-CCNC: 42 U/L (ref 10–40)
ANION GAP SERPL CALCULATED.3IONS-SCNC: 14 MMOL/L (ref 4–16)
AST SERPL-CCNC: 30 IU/L (ref 15–37)
BASOPHILS ABSOLUTE: 0 K/CU MM
BASOPHILS RELATIVE PERCENT: 0.3 % (ref 0–1)
BILIRUB SERPL-MCNC: 0.3 MG/DL (ref 0–1)
BUN BLDV-MCNC: 10 MG/DL (ref 6–23)
CALCIUM SERPL-MCNC: 9 MG/DL (ref 8.3–10.6)
CHLORIDE BLD-SCNC: 100 MMOL/L (ref 99–110)
CO2: 22 MMOL/L (ref 21–32)
CREAT SERPL-MCNC: 1.1 MG/DL (ref 0.9–1.3)
DIFFERENTIAL TYPE: ABNORMAL
EKG DIAGNOSIS: NORMAL
EKG Q-T INTERVAL: 292 MS
EKG QRS DURATION: 84 MS
EKG QTC CALCULATION (BAZETT): 449 MS
EKG R AXIS: 32 DEGREES
EKG T AXIS: 42 DEGREES
EKG VENTRICULAR RATE: 142 BPM
EOSINOPHILS ABSOLUTE: 0.1 K/CU MM
EOSINOPHILS RELATIVE PERCENT: 1.9 % (ref 0–3)
GFR AFRICAN AMERICAN: >60 ML/MIN/1.73M2
GFR NON-AFRICAN AMERICAN: >60 ML/MIN/1.73M2
GLUCOSE BLD-MCNC: 204 MG/DL (ref 70–99)
HCT VFR BLD CALC: 48.9 % (ref 42–52)
HEMOGLOBIN: 15.3 GM/DL (ref 13.5–18)
IMMATURE NEUTROPHIL %: 0.3 % (ref 0–0.43)
LYMPHOCYTES ABSOLUTE: 1.6 K/CU MM
LYMPHOCYTES RELATIVE PERCENT: 25.3 % (ref 24–44)
MCH RBC QN AUTO: 26.2 PG (ref 27–31)
MCHC RBC AUTO-ENTMCNC: 31.3 % (ref 32–36)
MCV RBC AUTO: 83.9 FL (ref 78–100)
MONOCYTES ABSOLUTE: 0.8 K/CU MM
MONOCYTES RELATIVE PERCENT: 12.3 % (ref 0–4)
NUCLEATED RBC %: 0 %
PDW BLD-RTO: 13.4 % (ref 11.7–14.9)
PLATELET # BLD: 299 K/CU MM (ref 140–440)
PMV BLD AUTO: 9.2 FL (ref 7.5–11.1)
POTASSIUM SERPL-SCNC: 3.7 MMOL/L (ref 3.5–5.1)
RBC # BLD: 5.83 M/CU MM (ref 4.6–6.2)
SEGMENTED NEUTROPHILS ABSOLUTE COUNT: 3.7 K/CU MM
SEGMENTED NEUTROPHILS RELATIVE PERCENT: 59.9 % (ref 36–66)
SODIUM BLD-SCNC: 136 MMOL/L (ref 135–145)
TOTAL IMMATURE NEUTOROPHIL: 0.02 K/CU MM
TOTAL NUCLEATED RBC: 0 K/CU MM
TOTAL PROTEIN: 7 GM/DL (ref 6.4–8.2)
TROPONIN T: <0.01 NG/ML
WBC # BLD: 6.2 K/CU MM (ref 4–10.5)

## 2022-05-13 PROCEDURE — 85025 COMPLETE CBC W/AUTO DIFF WBC: CPT

## 2022-05-13 PROCEDURE — 93010 ELECTROCARDIOGRAM REPORT: CPT | Performed by: INTERNAL MEDICINE

## 2022-05-13 PROCEDURE — 2500000003 HC RX 250 WO HCPCS: Performed by: EMERGENCY MEDICINE

## 2022-05-13 PROCEDURE — 93005 ELECTROCARDIOGRAM TRACING: CPT | Performed by: EMERGENCY MEDICINE

## 2022-05-13 PROCEDURE — 99284 EMERGENCY DEPT VISIT MOD MDM: CPT

## 2022-05-13 PROCEDURE — 80053 COMPREHEN METABOLIC PANEL: CPT

## 2022-05-13 PROCEDURE — 96374 THER/PROPH/DIAG INJ IV PUSH: CPT

## 2022-05-13 PROCEDURE — 84484 ASSAY OF TROPONIN QUANT: CPT

## 2022-05-13 RX ORDER — DILTIAZEM HYDROCHLORIDE 5 MG/ML
20 INJECTION INTRAVENOUS ONCE
Status: COMPLETED | OUTPATIENT
Start: 2022-05-13 | End: 2022-05-13

## 2022-05-13 RX ADMIN — DILTIAZEM HYDROCHLORIDE 20 MG: 5 INJECTION INTRAVENOUS at 00:54

## 2022-05-13 ASSESSMENT — PAIN - FUNCTIONAL ASSESSMENT: PAIN_FUNCTIONAL_ASSESSMENT: NONE - DENIES PAIN

## 2022-05-13 NOTE — ED PROVIDER NOTES
Triage Chief Complaint:   Tachycardia and Extremity Weakness    [place]:  Heidi Savage is a 80 y.o. male that presents for palpitations and generalized weakness. The patient states that he was at work walking around when he had the onset of symptoms around 10:30 PM.  Symptoms have been fairly constant without alleviating or exacerbating factors and states are similar to prior episodes of paroxysmal SVT. He has seen cardiology before in the past.  Patient is not on any calcium channel blockers or beta-blockers. Denies any recent medication changes. States that he did eat chocolate cake earlier and thinks that the caffeine may have set it off because caffeine has initiated these events before in the past.  Denies any recent illness, fever, cough, nausea, vomiting, diarrhea. Denies any chest pain or shortness of breath at this time. ROS:  At least 10 systems reviewed and otherwise acutely negative except as in the [address] Past Medical History:   Diagnosis Date    H/O echocardiogram 10/06/2020    EF55-60%, Mild TR & LVH.     History of exercise stress test 10/06/2020    Treadmill, Normal    Hyperlipidemia     Hypertension     PSVT (paroxysmal supraventricular tachycardia) ([address] 9/24/2020 9/2/2020 ECG at ER at [facility]    Type 2 diabetes mellitus [facility])      Past Surgical History:   Procedure Laterality Date    COLONOSCOPY  04/19/2021    COLONOSCOPY N/A 4/19/2021    COLONOSCOPY POLYPECTOMY SNARE/COLD BIOPSY performed by Kenny Aguilar MD at [facility] ENDOSCOPY     Family History   Problem Relation Age of Onset    Diabetes Mother     High Cholesterol Mother     Heart Disease Mother     Cancer Father     Diabetes Sister     High Blood Pressure Sister     High Cholesterol Sister      Social History     Socioeconomic History    Marital status: Single     Spouse name: Not on file    Number of children: Not on file    Years of education: Not on file    Highest education level: Not on file   Occupational History    Not on file Tobacco Use    Smoking status: Never Smoker    Smokeless tobacco: Never Used   Vaping Use    Vaping Use: Never used   Substance and Sexual Activity    Alcohol use: Not Currently     Comment: 2-3 beers per year/caffeine 2 cups of coffee a week and 4 pops a week    Drug use: No    Sexual activity: Not on file   Other Topics Concern    Not on file   Social History Narrative    Not on file     Social Determinants of Health     Financial Resource Strain: Low Risk     Difficulty of Paying Living Expenses: Not hard at all   Food Insecurity: No Food Insecurity    Worried About Running Out of Food in the Last Year: Never true    Ran Out of Food in the Last Year: Never true   Transportation Needs:     Lack of Transportation (Medical): Not on file    Lack of Transportation (Non-Medical): Not on file   Physical Activity:     Days of Exercise per Week: Not on file    Minutes of Exercise per Session: Not on file   Stress:     Feeling of Stress : Not on file   Social Connections:     Frequency of Communication with Friends and Family: Not on file    Frequency of Social Gatherings with Friends and Family: Not on file    Attends [employer] Services: Not on file    Active Member of Clubs or Organizations: Not on file    Attends Club or Organization Meetings: Not on file    Marital Status: Not on file   Intimate Partner Violence:     Fear of Current or Ex-Partner: Not on file    Emotionally Abused: Not on file    Physically Abused: Not on file    Sexually Abused: Not on file   Housing Stability:     Unable to Pay for Housing in the Last Year: Not on file    Number of [place] in the Last Year: Not on file    Unstable Housing in the Last Year: Not on file     No current facility-administered medications for this encounter.      Current Outpatient Medications   Medication Sig Dispense Refill    atorvastatin (LIPITOR) 20 MG tablet Take 1 tablet by mouth daily 90 tablet 1    SITagliptin (JANUVIA) 100 MG tablet take 1 tablet by mouth once daily 90 tablet 1    aspirin 81 MG EC tablet Take 1 tablet by mouth daily 30 tablet 3    lisinopril (PRINIVIL;ZESTRIL) 10 MG tablet take 1 tablet by mouth once daily 90 tablet 1    metFORMIN (GLUCOPHAGE-XR) 500 MG extended release tablet take 2 tablets by mouth once daily 180 tablet 1    ibuprofen (ADVIL;MOTRIN) 800 MG tablet Take 1 tablet by mouth every 6 hours as needed for Pain 120 tablet 0    blood glucose test strips (FREESTYLE LITE) strip 1 each by In Vitro route daily As needed. 100 each 3    ONETOUCH VERIO strip TEST TWICE DAILY AND AS NEEDED 100 strip 5    ONETOUCH DELICA LANCETS FINE MISC by Does not apply route      Glucose Blood (ONETOUCH VERIO VI) by In Vitro route       No Known Allergies    Nursing Notes Reviewed    Physical Exam:  ED Triage Vitals   Enc Vitals Group      BP 05/13/22 0022 (!) 133/105      Pulse 05/13/22 0022 144      Resp 05/13/22 0022 15      Temp 05/13/22 0022 99 °F (37.2 °C)      Temp Source 05/13/22 0022 Oral      SpO2 05/13/22 0022 98 %      Weight 05/13/22 0047 210 lb (95.3 kg)      Height 05/13/22 0047 6' 3\" (1.905 m)      Head Circumference --       Peak Flow --       Pain Score --       Pain Loc --       Pain Edu? --       Excl. in [address]? --      GENERAL APPEARANCE: Awake and alert. Cooperative. No acute distress. HEAD: Normocephalic. Atraumatic. EYES: EOM's grossly intact. Sclera anicteric. ENT: Mucous membranes are moist. Tolerates saliva. No trismus. NECK: Supple. Trachea midline. HEART: Tachycardia. Radial pulses 2+. LUNGS: Respirations unlabored. CTAB  ABDOMEN: Soft. Non-tender. No guarding or rebound. EXTREMITIES: No acute deformities. SKIN: Warm and dry. NEUROLOGICAL: No gross facial drooping. Moves all 4 extremities spontaneously. PSYCHIATRIC: Normal mood.     I have reviewed and interpreted all of the currently available lab results from this visit (if applicable):  Results for orders placed or performed during the hospital encounter of 05/13/22   CBC with Auto Differential   Result Value Ref Range    WBC 6.2 4.0 - 10.5 K/CU MM    RBC 5.83 4.6 - 6.2 M/CU MM    Hemoglobin 15.3 13.5 - 18.0 GM/DL    Hematocrit 48.9 42 - 52 %    MCV 83.9 78 - 100 FL    MCH 26.2 (L) 27 - 31 PG    MCHC 31.3 (L) 32.0 - 36.0 %    RDW 13.4 11.7 - 14.9 %    Platelets 939 446 - 051 K/CU MM    MPV 9.2 7.5 - 11.1 FL    Differential Type AUTOMATED DIFFERENTIAL     Segs Relative 59.9 36 - 66 %    Lymphocytes % 25.3 24 - 44 %    Monocytes % 12.3 (H) 0 - 4 %    Eosinophils % 1.9 0 - 3 %    Basophils % 0.3 0 - 1 %    Segs Absolute 3.7 K/CU MM    Lymphocytes Absolute 1.6 K/CU MM    Monocytes Absolute 0.8 K/CU MM    Eosinophils Absolute 0.1 K/CU MM    Basophils Absolute 0.0 K/CU MM    Nucleated RBC % 0.0 %    Total Nucleated RBC 0.0 K/CU MM    Total Immature Neutrophil 0.02 K/CU MM    Immature Neutrophil % 0.3 0 - 0.43 %   Comprehensive Metabolic Panel w/ Reflex to MG   Result Value Ref Range    Sodium 136 135 - 145 MMOL/L    Potassium 3.7 3.5 - 5.1 MMOL/L    Chloride 100 99 - 110 mMol/L    CO2 22 21 - 32 MMOL/L    BUN 10 6 - 23 MG/DL    CREATININE 1.1 0.9 - 1.3 MG/DL    Glucose 204 (H) 70 - 99 MG/DL    Calcium 9.0 8.3 - 10.6 MG/DL    Albumin 4.2 3.4 - 5.0 GM/DL    Total Protein 7.0 6.4 - 8.2 GM/DL    Total Bilirubin 0.3 0.0 - 1.0 MG/DL    ALT 42 (H) 10 - 40 U/L    AST 30 15 - 37 IU/L    Alkaline Phosphatase 95 40 - 128 IU/L    GFR Non-African American >60 >60 mL/min/1.73m2    GFR African American >60 >60 mL/min/1.73m2    Anion Gap 14 4 - 16   Troponin   Result Value Ref Range    Troponin T <0.010 <0.01 NG/ML      Radiographs (if obtained):  [] The following radiograph was interpreted by bernardelf in the absence of a radiologist:  [] Radiologist's Report Reviewed:    EKG (if obtained): (All EKG's are interpreted by myself in the absence of a cardiologist)  Supraventricular tachycardia with normal axis. Narrow complex. Frequent PVCs. No specific ST or T wave changes. No pathologic Q waves.   QTC is normal.    MDM:  Plan of care is discussed thoroughly with the patient and family if present. If performed, all imaging and lab work also discussed with patient. All relevant prior results and chart reviewed if available. Patient presents as above with recurrent paroxysmal SVT. Denies any chest pain or shortness of breath. He is hemodynamically stable. Heart rate is in the 140s. IV access obtained and patient placed on cardiac monitoring and defibrillator pads. He appears very comfortable at this time. Vagal maneuvers including REVERT attempted without success. Patient was given 20 mg of diltiazem. Had good response remained hemodynamically stable and converted to normal sinus rhythm with heart rate in the 80s. Metabolic work-up is unremarkable. Remained stable here in the emergency department. He is discharged in good condition and agrees to follow-up with cardiology or return for any new or worsening symptoms. I directly delivered medical care to this critically ill patient. Timely evaluation and treatment was necessary to address the significant organ system dysfunction present in this patient. Due to high probability of clinically significant, life-threatening deterioration, the patient required my highest level of preparedness to intervene emergently and I personally spent this critical care time directly and personally managing the patient. I was involved in the stabilization of this critical patient for 35 minutes. During this time I was physically present at the bedside during my initial exam and for re-examinations at intervals coordinating this patient's care with other physicians, examining radiographs, interpreting electrocardiograms and rhythm strips, reviewing laboratory results, discussing the patient's condition and management with the patient/family. Time billed does not include time for procedures. Clinical Impression:  1.  Paroxysmal supraventricular tachycardia ([facility] [address] [address]      (Please note that portions of this note may have been completed with a voice recognition program. Efforts were made to edit the dictations but occasionally words are mis-transcribed.)    Gar Silvan, MD Gerre Cockayne, MD  05/13/22 [id number]

## 2022-05-14 ENCOUNTER — APPOINTMENT (OUTPATIENT)
Dept: GENERAL RADIOLOGY | Age: 72
End: 2022-05-14
Payer: COMMERCIAL

## 2022-05-14 ENCOUNTER — HOSPITAL ENCOUNTER (EMERGENCY)
Age: 72
Discharge: HOME | End: 2022-05-14
Attending: STUDENT IN AN ORGANIZED HEALTH CARE EDUCATION/TRAINING PROGRAM
Payer: COMMERCIAL

## 2022-05-14 VITALS
HEIGHT: 75 IN | DIASTOLIC BLOOD PRESSURE: 77 MMHG | TEMPERATURE: 98.6 F | SYSTOLIC BLOOD PRESSURE: 143 MMHG | OXYGEN SATURATION: 96 % | RESPIRATION RATE: 17 BRPM | BODY MASS INDEX: 26.11 KG/M2 | HEART RATE: 88 BPM | WEIGHT: 210 LBS

## 2022-05-14 DIAGNOSIS — I47.1 PAROXYSMAL SUPRAVENTRICULAR TACHYCARDIA (HCC): Primary | ICD-10-CM

## 2022-05-14 LAB
ALBUMIN SERPL-MCNC: 4.5 GM/DL (ref 3.4–5)
ALP BLD-CCNC: 83 IU/L (ref 40–129)
ALT SERPL-CCNC: 46 U/L (ref 10–40)
ANION GAP SERPL CALCULATED.3IONS-SCNC: 13 MMOL/L (ref 4–16)
AST SERPL-CCNC: 35 IU/L (ref 15–37)
BASOPHILS ABSOLUTE: 0 K/CU MM
BASOPHILS RELATIVE PERCENT: 0.3 % (ref 0–1)
BILIRUB SERPL-MCNC: 1 MG/DL (ref 0–1)
BUN BLDV-MCNC: 13 MG/DL (ref 6–23)
CALCIUM SERPL-MCNC: 9.3 MG/DL (ref 8.3–10.6)
CHLORIDE BLD-SCNC: 104 MMOL/L (ref 99–110)
CO2: 22 MMOL/L (ref 21–32)
CREAT SERPL-MCNC: 1 MG/DL (ref 0.9–1.3)
DIFFERENTIAL TYPE: ABNORMAL
EOSINOPHILS ABSOLUTE: 0.1 K/CU MM
EOSINOPHILS RELATIVE PERCENT: 0.7 % (ref 0–3)
GFR AFRICAN AMERICAN: >60 ML/MIN/1.73M2
GFR NON-AFRICAN AMERICAN: >60 ML/MIN/1.73M2
GLUCOSE BLD-MCNC: 159 MG/DL (ref 70–99)
HCT VFR BLD CALC: 48 % (ref 42–52)
HEMOGLOBIN: 14.6 GM/DL (ref 13.5–18)
IMMATURE NEUTROPHIL %: 0.3 % (ref 0–0.43)
LYMPHOCYTES ABSOLUTE: 2.3 K/CU MM
LYMPHOCYTES RELATIVE PERCENT: 30.1 % (ref 24–44)
MCH RBC QN AUTO: 26.3 PG (ref 27–31)
MCHC RBC AUTO-ENTMCNC: 30.4 % (ref 32–36)
MCV RBC AUTO: 86.3 FL (ref 78–100)
MONOCYTES ABSOLUTE: 1 K/CU MM
MONOCYTES RELATIVE PERCENT: 12.7 % (ref 0–4)
NUCLEATED RBC %: 0 %
PDW BLD-RTO: 13.5 % (ref 11.7–14.9)
PLATELET # BLD: 276 K/CU MM (ref 140–440)
PMV BLD AUTO: 9.2 FL (ref 7.5–11.1)
POTASSIUM SERPL-SCNC: 3.9 MMOL/L (ref 3.5–5.1)
RBC # BLD: 5.56 M/CU MM (ref 4.6–6.2)
SEGMENTED NEUTROPHILS ABSOLUTE COUNT: 4.3 K/CU MM
SEGMENTED NEUTROPHILS RELATIVE PERCENT: 55.9 % (ref 36–66)
SODIUM BLD-SCNC: 139 MMOL/L (ref 135–145)
TOTAL IMMATURE NEUTOROPHIL: 0.02 K/CU MM
TOTAL NUCLEATED RBC: 0 K/CU MM
TOTAL PROTEIN: 7.5 GM/DL (ref 6.4–8.2)
TROPONIN T: <0.01 NG/ML
WBC # BLD: 7.7 K/CU MM (ref 4–10.5)

## 2022-05-14 PROCEDURE — 85025 COMPLETE CBC W/AUTO DIFF WBC: CPT

## 2022-05-14 PROCEDURE — 71045 X-RAY EXAM CHEST 1 VIEW: CPT

## 2022-05-14 PROCEDURE — 99285 EMERGENCY DEPT VISIT HI MDM: CPT

## 2022-05-14 PROCEDURE — 93005 ELECTROCARDIOGRAM TRACING: CPT | Performed by: STUDENT IN AN ORGANIZED HEALTH CARE EDUCATION/TRAINING PROGRAM

## 2022-05-14 PROCEDURE — 2500000003 HC RX 250 WO HCPCS: Performed by: STUDENT IN AN ORGANIZED HEALTH CARE EDUCATION/TRAINING PROGRAM

## 2022-05-14 PROCEDURE — 84484 ASSAY OF TROPONIN QUANT: CPT

## 2022-05-14 PROCEDURE — 80053 COMPREHEN METABOLIC PANEL: CPT

## 2022-05-14 PROCEDURE — 96374 THER/PROPH/DIAG INJ IV PUSH: CPT

## 2022-05-14 RX ORDER — DILTIAZEM HYDROCHLORIDE 5 MG/ML
20 INJECTION INTRAVENOUS ONCE
Status: COMPLETED | OUTPATIENT
Start: 2022-05-14 | End: 2022-05-14

## 2022-05-14 RX ADMIN — DILTIAZEM HYDROCHLORIDE 20 MG: 5 INJECTION INTRAVENOUS at 10:09

## 2022-05-14 ASSESSMENT — PAIN - FUNCTIONAL ASSESSMENT: PAIN_FUNCTIONAL_ASSESSMENT: NONE - DENIES PAIN

## 2022-05-14 NOTE — ED PROVIDER NOTES
Get Blood ED Attending Note:    NAME: Kristen Adams 80 y.o.  CSN: [id number]  MRN: [id number]   PCP:    Alena Painting MD      History:     Chief Complaint:    Heart Racing      HPI:      The history was obtained from the patient. Dragan Saeed is a 80 y.o. male who presents with sensation of his heart racing. Patient reports that he was mowing his lawn when he started to feel lightheaded, dizzy and feeling like his heart was racing. Reports that he was previously here for same symptoms. Denies any chest pain, SOB, nausea or vomiting. Denies any caffeine consumption or energy drinks. Reports previous episode also happened while he was exerting himself at  Upon his arrival on triage, patient was noticed to be in SVT and brought back immediately. PMHx:    Past Medical History:   Diagnosis Date    H/O echocardiogram 10/06/2020    EF55-60%, Mild TR & LVH.  History of exercise stress test 10/06/2020    Treadmill, Normal    Hyperlipidemia     Hypertension     PSVT (paroxysmal supraventricular tachycardia) ([facility] [address] 9/24/2020 9/2/2020 ECG at ER at [facility]    Type 2 diabetes mellitus [facility])        PMSx:    Past Surgical History:   Procedure Laterality Date    COLONOSCOPY  04/19/2021    COLONOSCOPY N/A 4/19/2021    COLONOSCOPY POLYPECTOMY SNARE/COLD BIOPSY performed by Woodrow Prajapati MD at [facility]. Hx:   Family History   Problem Relation Age of Onset    Diabetes Mother     High Cholesterol Mother     Heart Disease Mother     Cancer Father     Diabetes Sister     High Blood Pressure Sister     High Cholesterol Sister        SOC.  Hx:   Social History     Socioeconomic History    Marital status: Single     Spouse name: Not on file    Number of children: Not on file    Years of education: Not on file    Highest education level: Not on file   Occupational History    Not on file   Tobacco Use    Smoking status: Never Smoker    Smokeless tobacco: Never Used   Vaping Use    Vaping Use: Never used   Substance and Sexual Activity    Alcohol use: Not Currently     Comment: 2-3 beers per year/caffeine 2 cups of coffee a week and 4 pops a week    Drug use: No    Sexual activity: Not on file   Other Topics Concern    Not on file   Social History Narrative    Not on file     Social Determinants of Health     Financial Resource Strain: Low Risk     Difficulty of Paying Living Expenses: Not hard at all   Food Insecurity: No Food Insecurity    Worried About Running Out of Food in the Last Year: Never true    [place] of Food in the Last Year: Never true   Transportation Needs:     Lack of Transportation (Medical): Not on file    Lack of Transportation (Non-Medical): Not on file   Physical Activity:     Days of Exercise per Week: Not on file    Minutes of Exercise per Session: Not on file   Stress:     Feeling of Stress : Not on file   Social Connections:     Frequency of Communication with Friends and Family: Not on file    Frequency of Social Gatherings with Friends and Family: Not on file    Attends Church Services: Not on file    Active Member of [address] [place] [address] or Organizations: Not on file    Attends Club or Organization Meetings: Not on file    Marital Status: Not on file   Intimate Partner Violence:     Fear of Current or Ex-Partner: Not on file    Emotionally Abused: Not on file    Physically Abused: Not on file    Sexually Abused: Not on file   Housing Stability:     Unable to Pay for Housing in the Last Year: Not on file    Number of [place] in the Last Year: Not on file    Unstable Housing in the Last Year: Not on file       MEDs:    Previous Medications    ASPIRIN 81 MG EC TABLET    Take 1 tablet by mouth daily    ATORVASTATIN (LIPITOR) 20 MG TABLET    Take 1 tablet by mouth daily    BLOOD GLUCOSE TEST STRIPS (FREESTYLE LITE) STRIP    1 each by In Vitro route daily As needed.     GLUCOSE BLOOD (ONETOUCH VERIO VI) by In Vitro route    IBUPROFEN (ADVIL;MOTRIN) 800 MG TABLET    Take 1 tablet by mouth every 6 hours as needed for Pain    LISINOPRIL (PRINIVIL;ZESTRIL) 10 MG TABLET    take 1 tablet by mouth once daily    METFORMIN (GLUCOPHAGE-XR) 500 MG EXTENDED RELEASE TABLET    take 2 tablets by mouth once daily    ONETOUCH DELICA LANCETS FINE MISC    by Does not apply route    ONETOUCH VERIO STRIP    TEST TWICE DAILY AND AS NEEDED    SITAGLIPTIN (JANUVIA) 100 MG TABLET    take 1 tablet by mouth once daily        ALL:   No Known Allergies    ROS:  Review of Systems   Constitutional: Negative for fatigue and fever. HENT: Negative for congestion and sore throat. Eyes: Negative for pain and redness. Respiratory: Negative for cough, chest tightness and shortness of breath. Cardiovascular: Positive for palpitations. Negative for chest pain and leg swelling. Gastrointestinal: Negative for abdominal pain, diarrhea, nausea and vomiting. Genitourinary: Negative for difficulty urinating. Skin: Negative for rash. Neurological: Positive for dizziness and light-headedness. Negative for headaches. Psychiatric/Behavioral: Negative for agitation and behavioral problems. Positives andpertinent negatives as per HPI. All other systems were reviewed and are negative. Physical Exam:      Patient Vitals for the past 24 hrs:   BP Temp Temp src Pulse Resp SpO2 Height Weight   05/14/22 1030 (!) 152/84 -- -- 94 17 95 % -- --   05/14/22 0938 -- 98.3 °F (36.8 °C) Oral -- -- -- -- --   05/14/22 0935 (!) 155/111 -- -- 140 18 98 % 6' 3\" (1.905 m) 210 lb (95.3 kg)                  Physical Exam  Vitals and nursing note reviewed. Constitutional:       Appearance: Normal appearance. HENT:      Head: Normocephalic. Eyes:      Conjunctiva/sclera: Conjunctivae normal.      Pupils: Pupils are equal, round, and reactive to light. Cardiovascular:      Rate and Rhythm: Regular rhythm. Tachycardia present.       Pulses: Normal pulses. Heart sounds: No murmur heard. No gallop. Pulmonary:      Effort: Pulmonary effort is normal.      Breath sounds: Normal breath sounds. Abdominal:      General: There is no distension. Palpations: Abdomen is soft. Tenderness: There is no abdominal tenderness. There is no guarding or rebound. Musculoskeletal:         General: Normal range of motion. Cervical back: Normal range of motion and neck supple. Right lower leg: No edema. Left lower leg: No edema. Skin:     General: Skin is warm and dry. Capillary Refill: Capillary refill takes less than 2 seconds. Neurological:      General: No focal deficit present. Mental Status: He is alert and oriented to person, place, and time. Motor: No weakness. Gait: Gait normal.   Psychiatric:         Mood and Affect: Mood normal.         Behavior: Behavior normal.              Laboratory & Radiological Imaging (if done):      Labs Reviewed   CBC WITH AUTO DIFFERENTIAL - Abnormal; Notable for the following components:       Result Value    MCH 26.3 (*)     MCHC 30.4 (*)     Monocytes % 12.7 (*)     All other components within normal limits   COMPREHENSIVE METABOLIC PANEL W/ REFLEX TO MG FOR LOW K - Abnormal; Notable for the following components:    Glucose 159 (*)     ALT 46 (*)     All other components within normal limits   TROPONIN        Interpretation per the Radiologist below, if available at the time of this note:  XR CHEST PORTABLE    Result Date: 5/14/2022  EXAMINATION: ONE XRAY VIEW OF THE CHEST 5/14/2022 10:10 am COMPARISON: 02/19/2021 HISTORY: ORDERING SYSTEM PROVIDED HISTORY: Palpitations, tachycardia. TECHNOLOGIST PROVIDED HISTORY: Reason for exam:->Palpitations, tachycardia. Reason for Exam: Palpitations, tachycardia. Additional signs and symptoms: Palpitations, tachycardia. Relevant Medical/Surgical History: Palpitations, tachycardia. FINDINGS: The heart and mediastinal structures are stable.   The pulmonary vasculature is normal.  The lungs are clear. Arthritis of the right shoulder is partially evident. No acute cardiopulmonary disease. Medical Decision Making/           Patient is a 80year old male that presented to the ED with palpitations and was found to be in SVT. Upon reviewed of patient's file he was here yesterday with similar symptoms and he received 20 mg of Cardizem which converted his rhythm. Patient was discharged with a follow up with cardiology which patient stated has in several weeks. Patient was given a bolus of Cardizem 20 mg which converted him back to sinus rhythm. EKG was repeated and showed the sinus rhythm. Laboratory and XR were within normal limits. Patient was monitored in the ED and had no other episodes of SVT. However, due to his last episode of SVT being a day ago and with no previous history of SVT in the past beside the last two days, discussed with the patient the need for admission for further workup. I have recommended admission to the hospital, but Marie Madison refuses. The risks (including but not limited to suffering and death) as well as the benefits were explained to the patient. Questions were sought and answered, the patient voiced understanding and accepts these risks. I have encouraged the patient to return to have their evaluation completed as we are glad to do so. I have also instructed Marie Madison on the importance of follow-up and to return for any worsening or worrisome concerns. Discussed to also limit exertion at this time. Marie Madison appears competent to make medical decisions at this time. AMA form signed and placed on the chart. Procedure(s):   12 lead EKG per my interpretation:  EKG #1   Supraventricular Tachycardia (SVT) 140  Axis is   Normal  QTc is  normal  There is no specific T wave changes appreciated. There is no specific ST wave changes appreciated.   SVT   Prior EKG to compare with was available, 5/13/2022 which also showed SVT     12 lead EKG per my interpretation:  EKG #2  Normal Sinus Rhythm 98  Axis is   Normal  QTc is  normal  There is no specific T wave changes appreciated. There is no specific ST wave changes appreciated. No STEMI, no SVT  Prior EKG to compare with was available See above            Clinical Impression:      1. Paroxysmal supraventricular tachycardia (HCC)          Disposition:              Patient left against medical advice        New Prescriptions    No medications on file                                                                   Sarita Shields M.D                                                              ED Attending Physician      (Please note that portions of this note have been completed with a voice recognition software.  Efforts were made to correct any errors, butoccasionally words are mis-transcribed.)               Sarita Shields MD  05/16/22 [facility]

## 2022-05-14 NOTE — ED TRIAGE NOTES
Patient was seen 5/13 here for tachycardia, dx paroxysmal SVT. Patient says he was mowing this morning and started to feel like his heart was racing. Patient denies chest pain or shortness of breath.

## 2022-05-16 LAB
EKG ATRIAL RATE: 77 BPM
EKG ATRIAL RATE: 98 BPM
EKG DIAGNOSIS: NORMAL
EKG DIAGNOSIS: NORMAL
EKG P AXIS: 52 DEGREES
EKG P-R INTERVAL: 162 MS
EKG Q-T INTERVAL: 282 MS
EKG Q-T INTERVAL: 316 MS
EKG QRS DURATION: 78 MS
EKG QRS DURATION: 84 MS
EKG QTC CALCULATION (BAZETT): 403 MS
EKG QTC CALCULATION (BAZETT): 430 MS
EKG R AXIS: 32 DEGREES
EKG R AXIS: 39 DEGREES
EKG T AXIS: -31 DEGREES
EKG T AXIS: 44 DEGREES
EKG VENTRICULAR RATE: 140 BPM
EKG VENTRICULAR RATE: 98 BPM

## 2022-05-16 PROCEDURE — 93010 ELECTROCARDIOGRAM REPORT: CPT | Performed by: INTERNAL MEDICINE

## 2022-05-16 ASSESSMENT — ENCOUNTER SYMPTOMS
COUGH: 0
DIARRHEA: 0
NAUSEA: 0
CHEST TIGHTNESS: 0
VOMITING: 0
EYE PAIN: 0
EYE REDNESS: 0
ABDOMINAL PAIN: 0
SORE THROAT: 0
SHORTNESS OF BREATH: 0

## 2022-06-03 RX ORDER — ATORVASTATIN CALCIUM 20 MG/1
20 TABLET, FILM COATED ORAL DAILY
Qty: 90 TABLET | Refills: 0 | Status: SHIPPED | OUTPATIENT
Start: 2022-06-03 | End: 2022-07-15 | Stop reason: SDUPTHER

## 2022-06-03 RX ORDER — METFORMIN HYDROCHLORIDE 500 MG/1
TABLET, EXTENDED RELEASE ORAL
Qty: 180 TABLET | Refills: 0 | Status: SHIPPED | OUTPATIENT
Start: 2022-06-03 | End: 2022-09-01 | Stop reason: SDUPTHER

## 2022-06-03 RX ORDER — LISINOPRIL 10 MG/1
TABLET ORAL
Qty: 90 TABLET | Refills: 0 | Status: SHIPPED | OUTPATIENT
Start: 2022-06-03 | End: 2022-11-02 | Stop reason: SDUPTHER

## 2022-06-07 ENCOUNTER — TELEMEDICINE (OUTPATIENT)
Dept: FAMILY MEDICINE CLINIC | Age: 72
End: 2022-06-07
Payer: COMMERCIAL

## 2022-06-07 DIAGNOSIS — Z00.00 MEDICARE ANNUAL WELLNESS VISIT, SUBSEQUENT: Primary | ICD-10-CM

## 2022-06-07 PROCEDURE — 1123F ACP DISCUSS/DSCN MKR DOCD: CPT | Performed by: STUDENT IN AN ORGANIZED HEALTH CARE EDUCATION/TRAINING PROGRAM

## 2022-06-07 PROCEDURE — 3017F COLORECTAL CA SCREEN DOC REV: CPT | Performed by: STUDENT IN AN ORGANIZED HEALTH CARE EDUCATION/TRAINING PROGRAM

## 2022-06-07 PROCEDURE — G0439 PPPS, SUBSEQ VISIT: HCPCS | Performed by: STUDENT IN AN ORGANIZED HEALTH CARE EDUCATION/TRAINING PROGRAM

## 2022-06-07 SDOH — ECONOMIC STABILITY: FOOD INSECURITY: WITHIN THE PAST 12 MONTHS, THE FOOD YOU BOUGHT JUST DIDN'T LAST AND YOU DIDN'T HAVE MONEY TO GET MORE.: NEVER TRUE

## 2022-06-07 SDOH — ECONOMIC STABILITY: FOOD INSECURITY: WITHIN THE PAST 12 MONTHS, YOU WORRIED THAT YOUR FOOD WOULD RUN OUT BEFORE YOU GOT MONEY TO BUY MORE.: NEVER TRUE

## 2022-06-07 ASSESSMENT — SOCIAL DETERMINANTS OF HEALTH (SDOH): HOW HARD IS IT FOR YOU TO PAY FOR THE VERY BASICS LIKE FOOD, HOUSING, MEDICAL CARE, AND HEATING?: NOT HARD AT ALL

## 2022-06-07 ASSESSMENT — PATIENT HEALTH QUESTIONNAIRE - PHQ9
1. LITTLE INTEREST OR PLEASURE IN DOING THINGS: 0
SUM OF ALL RESPONSES TO PHQ9 QUESTIONS 1 & 2: 0
SUM OF ALL RESPONSES TO PHQ QUESTIONS 1-9: 0
2. FEELING DOWN, DEPRESSED OR HOPELESS: 0
SUM OF ALL RESPONSES TO PHQ QUESTIONS 1-9: 0

## 2022-06-07 ASSESSMENT — LIFESTYLE VARIABLES: HOW OFTEN DO YOU HAVE A DRINK CONTAINING ALCOHOL: NEVER

## 2022-06-07 NOTE — PROGRESS NOTES
Medicare Annual Wellness Visit    Marie Madison is here for Medicare AWV    Assessment & Plan   Medicare annual wellness visit, subsequent      Recommendations for Preventive Services Due: see orders and patient instructions/AVS.  Recommended screening schedule for the next 5-10 years is provided to the patient in written form: see Patient Instructions/AVS.     No follow-ups on file. Subjective       Patient's complete Health Risk Assessment and screening values have been reviewed and are found in Flowsheets. The following problems were reviewed today and where indicated follow up appointments were made and/or referrals ordered.     Positive Risk Factor Screenings with Interventions:             General Health and ACP:  General  In general, how would you say your health is?: Very Good  In the past 7 days, have you experienced any of the following: New or Increased Pain, New or Increased Fatigue, Loneliness, Social Isolation, Stress or Anger?: (!) Yes  Select all that apply: (!) New or Increased Fatigue (some fatigue due to his meds)  Do you get the social and emotional support that you need?: Yes  Do you have a Living Will?: Yes    Advance Directives     Power of  Living Will ACP-Advance Directive ACP-Power of     Not on File Not on File Not on File Not on File      General Health Risk Interventions:  · Fatigue: patient declines any further evaluation/treatment for this issue, patient states he has had some fatigue and thinks it's due to his medications, has appointment with PCP next month  · No Living Will: ACP documents already completed- patient asked to provide copy to the office    Health Habits/Nutrition:     Physical Activity: Inactive    Days of Exercise per Week: 0 days    Minutes of Exercise per Session: 0 min     Have you lost any weight without trying in the past 3 months?: No     Have you seen the dentist within the past year?: (!) No    Health Habits/Nutrition Interventions:  · Inadequate physical activity:  patient is not ready to increase his/her physical activity level at this time  · Nutritional issues:  patient is not ready to address his/her nutritional/weight issues at this time  · Dental exam overdue:  patient encouraged to make appointment with his/her dentist    Hearing/Vision:  Do you or your family notice any trouble with your hearing that hasn't been managed with hearing aids?: No  Do you have difficulty driving, watching TV, or doing any of your daily activities because of your eyesight?: No  Have you had an eye exam within the past year?: (!) No  No exam data present    Hearing/Vision Interventions:  · Vision concerns:  patient encouraged to make appointment with his/her eye specialist            Objective      Patient-Reported Vitals  No data recorded     Unable to obtain 3 vital signs due to patient not having equipment to take blood pressure/temperature. No Known Allergies  Prior to Visit Medications    Medication Sig Taking? Authorizing Provider   SITagliptin (JANUVIA) 100 MG tablet take 1 tablet by mouth once daily Yes Joce Rodríguez DO   metFORMIN (GLUCOPHAGE-XR) 500 mg extended release tablet take 2 tablets by mouth once daily Yes Joce Rodríguez DO   lisinopril (PRINIVIL;ZESTRIL) 10 MG tablet take 1 tablet by mouth once daily Yes Joce Rodríguez DO   atorvastatin (LIPITOR) 20 MG tablet Take 1 tablet by mouth daily Yes Joce Rodríguez DO   aspirin 81 MG EC tablet Take 1 tablet by mouth daily Yes Willis Cerda MD   ibuprofen (ADVIL;MOTRIN) 800 MG tablet Take 1 tablet by mouth every 6 hours as needed for Pain Yes [facility]-   blood glucose test strips (FREESTYLE LITE) strip 1 each by In Vitro route daily As needed.  Yes Willis Cerda MD   [facility] VERIO strip TEST TWICE DAILY AND AS NEEDED Yes Willis Cerda MD   [facility] DELICA LANCETS FINE MISC by Does not apply route Yes Historical Provider, MD   Glucose Blood (Michelleman Eugenio VI) by In Vitro route Yes Historical Provider, MD [place] (Including outside providers/suppliers regularly involved in providing care):   Patient Care Team:  Harry Mercedes MD as PCP - General (Family Medicine)  Harry Mercedes MD as PCP - [facility] Empaneled Provider     Reviewed and updated this visit:  Tobacco  Allergies  Meds  Med Hx  Surg Hx  Soc Hx  Fam Hx             I, Katt Caro LPN, 4/1/0954, performed the documented evaluation under the direct supervision of the attending physician. Gerald Suazo, was evaluated through a synchronous (real-time) audio encounter. The patient (or guardian if applicable) is aware that this is a billable service, which includes applicable co-pays. This Virtual Visit was conducted with patient's (and/or legal guardian's) consent. The visit was conducted pursuant to the emergency declaration under the [facility], [address] [place] [address] authority and the [employer] General Act. Patient identification was verified, and a caregiver was present when appropriate. The patient was located at Home: [address] [place] [address] [address]  [address] [id number]. Provider was located at [facility] ([address] [place] [address]): [address] [place] [address]. [place] [id number]. Total time spent for this encounter: Not billed by time    --Katt Caro LPN on 7/9/6357 at 7:82 PM    An electronic signature was used to authenticate this note. This encounter was performed under mySushant DOs, direct supervision, 6/7/2022.

## 2022-07-15 ENCOUNTER — OFFICE VISIT (OUTPATIENT)
Dept: FAMILY MEDICINE CLINIC | Age: 72
End: 2022-07-15
Payer: COMMERCIAL

## 2022-07-15 VITALS
RESPIRATION RATE: 16 BRPM | OXYGEN SATURATION: 98 % | HEART RATE: 67 BPM | WEIGHT: 197.7 LBS | SYSTOLIC BLOOD PRESSURE: 136 MMHG | DIASTOLIC BLOOD PRESSURE: 78 MMHG | BODY MASS INDEX: 24.58 KG/M2 | HEIGHT: 75 IN

## 2022-07-15 DIAGNOSIS — E11.9 TYPE 2 DIABETES MELLITUS WITHOUT COMPLICATION, WITHOUT LONG-TERM CURRENT USE OF INSULIN (HCC): ICD-10-CM

## 2022-07-15 DIAGNOSIS — I47.1 PSVT (PAROXYSMAL SUPRAVENTRICULAR TACHYCARDIA) (HCC): ICD-10-CM

## 2022-07-15 DIAGNOSIS — I47.1 PSVT (PAROXYSMAL SUPRAVENTRICULAR TACHYCARDIA) (HCC): Primary | ICD-10-CM

## 2022-07-15 DIAGNOSIS — I47.1 SVT (SUPRAVENTRICULAR TACHYCARDIA) (HCC): ICD-10-CM

## 2022-07-15 DIAGNOSIS — E78.2 MIXED HYPERLIPIDEMIA: ICD-10-CM

## 2022-07-15 LAB
ANION GAP SERPL CALCULATED.3IONS-SCNC: 11 MMOL/L (ref 3–16)
BUN BLDV-MCNC: 12 MG/DL (ref 7–20)
CALCIUM SERPL-MCNC: 10 MG/DL (ref 8.3–10.6)
CHLORIDE BLD-SCNC: 105 MMOL/L (ref 99–110)
CO2: 26 MMOL/L (ref 21–32)
CREAT SERPL-MCNC: 0.9 MG/DL (ref 0.8–1.3)
CREATININE URINE: 143.8 MG/DL (ref 39–259)
GFR AFRICAN AMERICAN: >60
GFR NON-AFRICAN AMERICAN: >60
GLUCOSE BLD-MCNC: 125 MG/DL (ref 70–99)
MAGNESIUM: 2.2 MG/DL (ref 1.8–2.4)
MICROALBUMIN UR-MCNC: <1.2 MG/DL
MICROALBUMIN/CREAT UR-RTO: NORMAL MG/G (ref 0–30)
POTASSIUM SERPL-SCNC: 4.5 MMOL/L (ref 3.5–5.1)
SODIUM BLD-SCNC: 142 MMOL/L (ref 136–145)
TSH REFLEX FT4: 2.45 UIU/ML (ref 0.27–4.2)

## 2022-07-15 PROCEDURE — 2022F DILAT RTA XM EVC RTNOPTHY: CPT | Performed by: STUDENT IN AN ORGANIZED HEALTH CARE EDUCATION/TRAINING PROGRAM

## 2022-07-15 PROCEDURE — 1036F TOBACCO NON-USER: CPT | Performed by: STUDENT IN AN ORGANIZED HEALTH CARE EDUCATION/TRAINING PROGRAM

## 2022-07-15 PROCEDURE — 1123F ACP DISCUSS/DSCN MKR DOCD: CPT | Performed by: STUDENT IN AN ORGANIZED HEALTH CARE EDUCATION/TRAINING PROGRAM

## 2022-07-15 PROCEDURE — G8427 DOCREV CUR MEDS BY ELIG CLIN: HCPCS | Performed by: STUDENT IN AN ORGANIZED HEALTH CARE EDUCATION/TRAINING PROGRAM

## 2022-07-15 PROCEDURE — 3044F HG A1C LEVEL LT 7.0%: CPT | Performed by: STUDENT IN AN ORGANIZED HEALTH CARE EDUCATION/TRAINING PROGRAM

## 2022-07-15 PROCEDURE — 99214 OFFICE O/P EST MOD 30 MIN: CPT | Performed by: STUDENT IN AN ORGANIZED HEALTH CARE EDUCATION/TRAINING PROGRAM

## 2022-07-15 PROCEDURE — 3017F COLORECTAL CA SCREEN DOC REV: CPT | Performed by: STUDENT IN AN ORGANIZED HEALTH CARE EDUCATION/TRAINING PROGRAM

## 2022-07-15 PROCEDURE — G8420 CALC BMI NORM PARAMETERS: HCPCS | Performed by: STUDENT IN AN ORGANIZED HEALTH CARE EDUCATION/TRAINING PROGRAM

## 2022-07-15 RX ORDER — ATORVASTATIN CALCIUM 20 MG/1
20 TABLET, FILM COATED ORAL DAILY
Qty: 90 TABLET | Refills: 0 | Status: SHIPPED | OUTPATIENT
Start: 2022-07-15

## 2022-07-15 ASSESSMENT — ENCOUNTER SYMPTOMS
WHEEZING: 0
SHORTNESS OF BREATH: 0
SORE THROAT: 0
NAUSEA: 0
ABDOMINAL PAIN: 0

## 2022-07-15 NOTE — PROGRESS NOTES
7/15/2022    Courtney Colbert    Chief Complaint   Patient presents with    6 Month Follow-Up     Had a couple days of rapid heart rate . Dr. Jaya Peacock took him off Trulicity because of this. Established New Doctor     NTP- former Dr. Jaya Peacock pt        HPI  History was obtained from patient  German Gr is a 80 y.o. male with a PMHx as listed below who presents today for follow up on chronic conditions. No acute complaints    Complaint of episodes of palpitations at times patient had to go to ED diagnosied with SVT   Patient left ED AMA has yet to follow with cardiology     1. PSVT 6/2 2020 and 1/20/21    2. Type 2 diabetes mellitus without complication, without long-term current use of insulin ([address]    3. SVT (supraventricular tachycardia) ([address]    4. Mixed hyperlipidemia             REVIEW OF SYMPTOMS    Review of Systems   Constitutional:  Negative for chills and fatigue. HENT:  Negative for congestion and sore throat. Respiratory:  Negative for shortness of breath and wheezing. Cardiovascular:  Negative for chest pain and palpitations. Gastrointestinal:  Negative for abdominal pain and nausea. Genitourinary:  Negative for frequency and urgency. Neurological:  Negative for light-headedness. PAST MEDICAL HISTORY  Past Medical History:   Diagnosis Date    H/O echocardiogram 10/06/2020    EF55-60%, Mild TR & LVH.     History of exercise stress test 10/06/2020    Treadmill, Normal    Hyperlipidemia     Hypertension     PSVT (paroxysmal supraventricular tachycardia) ([address] 9/24/2020 9/2/2020 ECG at ER at [facility]    Type 2 diabetes mellitus ([address]        FAMILY HISTORY  Family History   Problem Relation Age of Onset    Diabetes Mother     High Cholesterol Mother     Heart Disease Mother     Cancer Father     Diabetes Sister     High Blood Pressure Sister     High Cholesterol Sister     Alcohol Abuse Brother     Heart Disease Brother     Other Sister         brain aneurysym    Heart Disease Sister     No Known Problems Sister     No Known Problems Sister     No Known Problems Sister     No Known Problems Brother        SOCIAL HISTORY  Social History     Socioeconomic History    Marital status: Single   Tobacco Use    Smoking status: Never    Smokeless tobacco: Never   Vaping Use    Vaping Use: Never used   Substance and Sexual Activity    Alcohol use: Not Currently     Comment: 2-3 beers per year/caffeine 2 cups of coffee a week and 4 pops a week    Drug use: No     Social Determinants of Health     Financial Resource Strain: Low Risk     Difficulty of Paying Living Expenses: Not hard at all   Food Insecurity: No Food Insecurity    Worried About Running Out of Food in the Last Year: Never true    Ran Out of Food in the Last Year: Never true   Physical Activity: Inactive    Days of Exercise per Week: 0 days    Minutes of Exercise per Session: 0 min        SURGICAL HISTORY  Past Surgical History:   Procedure Laterality Date    COLONOSCOPY  04/19/2021    COLONOSCOPY N/A 4/19/2021    COLONOSCOPY POLYPECTOMY SNARE/COLD BIOPSY performed by Silvia Fan MD at [facility]  Current Outpatient Medications   Medication Sig Dispense Refill    atorvastatin (LIPITOR) 20 MG tablet Take 1 tablet by mouth in the morning. 90 tablet 0    SITagliptin (JANUVIA) 100 MG tablet take 1 tablet by mouth once daily 90 tablet 0    metFORMIN (GLUCOPHAGE-XR) 500 mg extended release tablet take 2 tablets by mouth once daily 180 tablet 0    lisinopril (PRINIVIL;ZESTRIL) 10 MG tablet take 1 tablet by mouth once daily 90 tablet 0    aspirin 81 MG EC tablet Take 1 tablet by mouth daily 30 tablet 3    ibuprofen (ADVIL;MOTRIN) 800 MG tablet Take 1 tablet by mouth every 6 hours as needed for Pain 120 tablet 0    blood glucose test strips (FREESTYLE LITE) strip 1 each by In Vitro route daily As needed.  100 each 3    ONETOUCH VERIO strip TEST TWICE DAILY AND AS NEEDED 100 strip 5    ONETOUCH DELICA LANCETS FINE MISC by Does not apply route      Glucose Blood (ONETOUCH VERIO VI) by In Vitro route       No current facility-administered medications for this visit. ALLERGIES  No Known Allergies    PHYSICAL EXAM    /78 (Site: Left Upper Arm, Position: Sitting, Cuff Size: Medium Adult)   Pulse 67   Resp 16   Ht 6' 3\" (1.905 m)   Wt 197 lb 11.2 oz (89.7 kg)   SpO2 98%   BMI 24.71 kg/m²     Physical Exam  Constitutional:       Appearance: Normal appearance. HENT:      Head: Normocephalic and atraumatic. Eyes:      Extraocular Movements: Extraocular movements intact. Pupils: Pupils are equal, round, and reactive to light. Cardiovascular:      Rate and Rhythm: Normal rate and regular rhythm. Pulses: Normal pulses. Heart sounds: No murmur heard. No friction rub. No gallop. Pulmonary:      Effort: Pulmonary effort is normal.      Breath sounds: Normal breath sounds. Skin:     General: Skin is warm and dry. Neurological:      General: No focal deficit present. Mental Status: He is alert. Psychiatric:         Mood and Affect: Mood normal.         Behavior: Behavior normal.       ASSESSMENT & PLAN    1. PSVT 6/2 2020 and 1/20/21    - Daniel Milan MD, Cardiology, [facility]  - Longterm Continuous Cardiac Event Monitor; Future  - Basic Metabolic Panel; Future  - TSH with Reflex to FT4; Future  - Magnesium; Future    2. Type 2 diabetes mellitus without complication, without long-term current use of insulin (HCC)    - Hemoglobin A1C; Future    3. SVT (supraventricular tachycardia) (HCC)    - Daniel Milan MD, Cardiology, [facility]  - Longterm Continuous Cardiac Event Monitor; Future  - Basic Metabolic Panel; Future  - TSH with Reflex to FT4; Future  - Magnesium; Future    4. Mixed hyperlipidemia    - atorvastatin (LIPITOR) 20 MG tablet; Take 1 tablet by mouth in the morning. Dispense: 90 tablet; Refill: 0    Continue current regimen   Given hx.  SVT's we will refer to cardio  DM2 stable      Return in about 2 months (around 9/15/2022).          Electronically signed by Scott Henson DO on 7/15/2022

## 2022-07-16 LAB
ESTIMATED AVERAGE GLUCOSE: 148.5 MG/DL
HBA1C MFR BLD: 6.8 %

## 2022-07-18 ENCOUNTER — TELEPHONE (OUTPATIENT)
Dept: FAMILY MEDICINE CLINIC | Age: 72
End: 2022-07-18

## 2022-07-18 NOTE — TELEPHONE ENCOUNTER
----- Message from Shobha Hayward sent at 7/18/2022  8:35 AM EDT -----  Subject: Appointment Request    Reason for Call: Established Patient Appointment needed: New Patient   Request Appointment    QUESTIONS    Reason for appointment request? No appointments available during search     Additional Information for Provider? Patient is looking to switch primary   care over to a Dr Praneeth Bruner or Dr Ham Faye if they are still there. Please call   and advise.   ---------------------------------------------------------------------------  --------------  Kirby Counter INFO  [phone]; OK to leave message on voicemail  ---------------------------------------------------------------------------  --------------  SCRIPT ANSWERS  KINJAL Screen: Jeannette Belle    -------------------------------------------------------------------------------  Tried to call patient per message from the Call Center----LM to contact our office to schedule appt if he still needs one.

## 2022-07-20 ENCOUNTER — OFFICE VISIT (OUTPATIENT)
Dept: CARDIOLOGY CLINIC | Age: 72
End: 2022-07-20
Payer: COMMERCIAL

## 2022-07-20 VITALS
BODY MASS INDEX: 25.17 KG/M2 | HEIGHT: 75 IN | OXYGEN SATURATION: 96 % | SYSTOLIC BLOOD PRESSURE: 130 MMHG | DIASTOLIC BLOOD PRESSURE: 70 MMHG | HEART RATE: 73 BPM | WEIGHT: 202.4 LBS

## 2022-07-20 DIAGNOSIS — I47.1 PSVT (PAROXYSMAL SUPRAVENTRICULAR TACHYCARDIA) (HCC): Primary | ICD-10-CM

## 2022-07-20 DIAGNOSIS — I10 PRIMARY HYPERTENSION: ICD-10-CM

## 2022-07-20 DIAGNOSIS — E11.00 TYPE 2 DIABETES MELLITUS WITH HYPEROSMOLARITY WITHOUT COMA, WITHOUT LONG-TERM CURRENT USE OF INSULIN (HCC): ICD-10-CM

## 2022-07-20 DIAGNOSIS — E78.00 PURE HYPERCHOLESTEROLEMIA: ICD-10-CM

## 2022-07-20 PROCEDURE — 1123F ACP DISCUSS/DSCN MKR DOCD: CPT | Performed by: INTERNAL MEDICINE

## 2022-07-20 PROCEDURE — 99214 OFFICE O/P EST MOD 30 MIN: CPT | Performed by: INTERNAL MEDICINE

## 2022-07-20 PROCEDURE — 2022F DILAT RTA XM EVC RTNOPTHY: CPT | Performed by: INTERNAL MEDICINE

## 2022-07-20 PROCEDURE — 3017F COLORECTAL CA SCREEN DOC REV: CPT | Performed by: INTERNAL MEDICINE

## 2022-07-20 PROCEDURE — 3044F HG A1C LEVEL LT 7.0%: CPT | Performed by: INTERNAL MEDICINE

## 2022-07-20 PROCEDURE — 1036F TOBACCO NON-USER: CPT | Performed by: INTERNAL MEDICINE

## 2022-07-20 PROCEDURE — G8427 DOCREV CUR MEDS BY ELIG CLIN: HCPCS | Performed by: INTERNAL MEDICINE

## 2022-07-20 PROCEDURE — G8417 CALC BMI ABV UP PARAM F/U: HCPCS | Performed by: INTERNAL MEDICINE

## 2022-07-20 RX ORDER — METOPROLOL SUCCINATE 25 MG/1
25 TABLET, EXTENDED RELEASE ORAL DAILY
Qty: 90 TABLET | Refills: 3 | Status: SHIPPED | OUTPATIENT
Start: 2022-07-20

## 2022-07-20 NOTE — ASSESSMENT & PLAN NOTE
Patient has symptomatic recurrent SVT. Start Toprol 25 mg daily and I have recommended him to have a EP consultation for ablation therapy. Details are discussed and multiple questions are addressed. Educational materials given on SVT as well as on ablation and he is says he will think about it and call us back. I will also discussed this case with Dr. Claudia Crawford just in case if he calls back we will refer him directly for EP consultation or ablation procedures. Patient verbalized understanding.

## 2022-07-20 NOTE — PROGRESS NOTES
Melody Hawley  1950  Artie Steinberg DO      Chief Complaint   Patient presents with    1 Year Follow Up    Dizziness     Lightheaded for a moment when lifting or pulling. This was about 2 months ago. Chief complaint and HPI:  Melody Hawley  is a 80 y.o. male following up for PSVT. Recently he was in the emergency room on May 16, 2022 with lightheadedness dizziness and he was noted to be in SVT heart rate was around 140 bpm.  He was given Cardizem 20 mg IV bolus and he converted to normal sinus rhythm. He had another most recent episode was on May 13, 2022 at the time he had presented with generalized weakness and he was noted to be in  bpm and had converted with diltiazem 20 mg IV bolus as well. He did not respond to carotid sinus massage and Valsalva maneuvers. His previous episodes of SVTs were in 2020 and 2021. Had similar presentations before as well. Never had syncope. He also denies any chest pains during tachyarrhythmias and there were no ST depressions on the EKGs. He says he was lifting something heavy which triggered SVT. He says he has about 2 episodes and ER. He works at [employer] [place] [employer]. He does not smoke or drink caffeine or alcohol. Rest of the Cardiovascular system review is otherwise unchanged from prior encounter. Past medical history:  has a past medical history of H/O echocardiogram, History of exercise stress test, Hyperlipidemia, Hypertension, PSVT (paroxysmal supraventricular tachycardia) [address] and Type 2 diabetes mellitus ([facility] Past surgical history:  has a past surgical history that includes Colonoscopy (04/19/2021) and Colonoscopy (N/A, 4/19/2021).   Social History:   Social History     Tobacco Use    Smoking status: Never    Smokeless tobacco: Never   Substance Use Topics    Alcohol use: Not Currently     Comment: 2-3 beers per year/caffeine 2 cups of coffee a week and 4 pops a week     Family history: family history includes Alcohol Abuse in his brother; Cancer in his father; Diabetes in his mother and sister; Heart Disease in his brother, mother, and sister; High Blood Pressure in his sister; High Cholesterol in his mother and sister; No Known Problems in his brother, sister, sister, and sister; Other in his sister. ALLERGIES:  Patient has no known allergies. Prior to Admission medications    Medication Sig Start Date End Date Taking? Authorizing Provider   metoprolol succinate (TOPROL XL) 25 MG extended release tablet Take 1 tablet by mouth in the morning. . 7/20/22  Yes Patsy Hewitt MD   atorvastatin (LIPITOR) 20 MG tablet Take 1 tablet by mouth in the morning. 7/15/22  Yes Joce Rodríguez DO   SITagliptin (JANUVIA) 100 MG tablet take 1 tablet by mouth once daily 6/3/22  Yes Joce Rodríguez DO   metFORMIN (GLUCOPHAGE-XR) 500 mg extended release tablet take 2 tablets by mouth once daily 6/3/22  Yes Joce Rodríguez DO   lisinopril (PRINIVIL;ZESTRIL) 10 MG tablet take 1 tablet by mouth once daily 6/3/22  Yes Joce Rodríguez DO   aspirin 81 MG EC tablet Take 1 tablet by mouth daily 11/29/21  Yes Ulises Benavidez MD   ibuprofen (ADVIL;MOTRIN) 800 MG tablet Take 1 tablet by mouth every 6 hours as needed for Pain 1/21/21  Yes [facility] PA-C   blood glucose test strips (FREESTYLE LITE) strip 1 each by In Vitro route daily As needed. 1/4/21  Yes Ulises Benavidez MD   [facility] VERIO strip TEST TWICE DAILY AND AS NEEDED 12/28/20  Yes MD Leisa Ramires LANCETS FINE MISC by Does not apply route   Yes Historical Provider, MD   Glucose Blood ([place]ley Six VI) by In Vitro route   Yes Historical Provider, MD     Vitals:    07/20/22 0805   BP: 130/70   Site: Left Upper Arm   Position: Sitting   Cuff Size: Large Adult   Pulse: 73   SpO2: 96%   Weight: 202 lb 6.4 oz (91.8 kg)   Height: 6' 3\" (1.905 m)      Body mass index is 25.3 kg/m².   Wt Readings from Last 3 Encounters:   07/20/22 202 lb 6.4 oz (91.8 kg)   07/15/22 197 lb 11.2 oz (89.7 kg)   05/14/22 210 lb (95.3 kg)     Constitutional:  Patient is normally built and nourished male in no apparent distress. Eyes: Pulls are equal.  No conjunctival pallor noted. NECK: No JVP or thyromegaly  Cardiovascular: Auscultation: Normal S1 and S2. No murmurs or gallops noted. Carotids are negative for bruits. Abdominal aorta is nonpalpable. No epigastric bruit noted. Peripheral pulses: Pedal pulses are 2+ equal in both feet. Respiratory:  Respiratory effort is normal. Breath sounds are clear to auscultation. Extremities:  No edema, clubbing,  Cyanosis, petechiae. SKIN: Warm and well perfused, no pallor or cyanosis  Abdomen:  No masses or tenderness. No organomegaly noted. Musculoskeletal: No obvious spinal deformities noted. Gait is normal.  Muscle strength is normal.  Neurologic:  Oriented to time, place, and person and non-anxious. No focal neurological deficit noted. Psychiatric: Normal mood and effect. EKGs from emergency room visit reviewed from May 13, 2022. The first EKG was at 12:33 AM was consistent with SVT rate was 142 bpm with frequent PVCs. Second EKG was from May 14, 2022 at 9:40 AM and he was in SVT rate was 140 bpm and there were no PVCs. The third EKG from May 14, 2022 was done at 11:00 it was normal sinus rhythm rate was 98 bpm.  This was essentially normal EKG.     LAB REVIEW:  CBC:   Lab Results   Component Value Date/Time    WBC 7.7 05/14/2022 09:58 AM    HGB 14.6 05/14/2022 09:58 AM    HCT 48.0 05/14/2022 09:58 AM     05/14/2022 09:58 AM     Lipids:   Lab Results   Component Value Date    CHOL 114 02/20/2021    TRIG 65 02/20/2021    HDL 44 02/20/2021    LDLCALC 50 09/13/2019   LDL was 62 on 2/20/2021    Renal:   Lab Results   Component Value Date/Time    BUN 12 07/15/2022 02:54 PM    CREATININE 0.9 07/15/2022 02:54 PM     07/15/2022 02:54 PM    K 4.5 07/15/2022 02:54 PM     PT/INR:   Lab Results   Component Value Date/Time    INR 0.89 02/19/2021 02:50 PM     Lab Results   Component Value Date    LABA1C 6.8 07/15/2022     IMPRESSION and RECOMMENDATIONS:      1. PSVT 6/2 2020 and 1/20/21  Assessment & Plan:  Patient has symptomatic recurrent SVT. Start Toprol 25 mg daily and I have recommended him to have a EP consultation for ablation therapy. Details are discussed and multiple questions are addressed. Educational materials given on SVT as well as on ablation and he is says he will think about it and call us back. I will also discussed this case with Dr. Romie Hager just in case if he calls back we will refer him directly for EP consultation or ablation procedures. Patient verbalized understanding. 2. Primary hypertension  Assessment & Plan:  Well-controlled on lisinopril 10 mg daily. Continue the same. 3. Type 2 diabetes mellitus with hyperosmolarity without coma, without long-term current use of insulin ([address]  Assessment & Plan:  Most recent hemoglobin A1c was reported 6.8 on 7/15/2022 suggesting diabetes is under control on metformin. Follows up with PCP. 4. Pure hypercholesterolemia  Assessment & Plan:  Last LDL was 62 on Epitol 20 mg daily. Continue the same. Recommend EP consultation for SVT ablation for recurrent symptomatic SVT. Patient will think about it and call. Patient education information given. Start Toprol 25 mg daily if tolerated. Appropriate prescriptions if needed on this visit are addressed. After visit summery is provided. Questions answered and patient verbalizes understanding. Follow up in 6 months with EKG,  sooner if needed. Porfirio Mathew MD, 7/20/2022 9:52 AM     Please note this report has been partially produced using speech recognition software and may contain errors related to that system including errors in grammar, punctuation, and spelling, as well as words and phrases that may be inappropriate.  If there are any questions or concerns please feel free to contact the dictating provider for clarification.

## 2022-07-20 NOTE — PATIENT INSTRUCTIONS
Please be informed that if you contact our office outside of normal business hours the physician on call cannot help with any scheduling or rescheduling issues, procedure instruction questions or any type of medication issue. We advise you for any urgent/emergency that you go to the nearest emergency room! PLEASE CALL OUR OFFICE DURING NORMAL BUSINESS HOURS    Monday - Friday   8 am to 5 pm    [facility] [address]: [phone]    [place]:  [phone]    **It is YOUR responsibilty to bring medication bottles and/or updated medication list to [address] [place] [address]. This will allow us to better serve you and all your healthcare needs**    Recommend EP consultation for SVT ablation for recurrent symptomatic SVT. Patient will think about it and call. Patient education information given. Start Toprol 25 mg daily if tolerated. Appropriate prescriptions if needed on this visit are addressed. After visit summery is provided. Questions answered and patient verbalizes understanding. Follow up in 6 months with EKG,  sooner if needed.

## 2022-07-20 NOTE — ASSESSMENT & PLAN NOTE
Most recent hemoglobin A1c was reported 6.8 on 7/15/2022 suggesting diabetes is under control on metformin. Follows up with PCP.

## 2022-08-22 NOTE — TELEPHONE ENCOUNTER
7-15-22  NA 9-16-22  [id number] [facility] [place] [facility] [place] [facility]

## 2022-09-01 RX ORDER — METFORMIN HYDROCHLORIDE 500 MG/1
TABLET, EXTENDED RELEASE ORAL
Qty: 180 TABLET | Refills: 0 | Status: SHIPPED | OUTPATIENT
Start: 2022-09-01

## 2022-11-02 RX ORDER — LISINOPRIL 10 MG/1
TABLET ORAL
Qty: 90 TABLET | Refills: 0 | Status: SHIPPED | OUTPATIENT
Start: 2022-11-02

## 2022-11-10 ENCOUNTER — OFFICE VISIT (OUTPATIENT)
Dept: FAMILY MEDICINE CLINIC | Age: 72
End: 2022-11-10
Payer: COMMERCIAL

## 2022-11-10 VITALS
SYSTOLIC BLOOD PRESSURE: 116 MMHG | WEIGHT: 200 LBS | DIASTOLIC BLOOD PRESSURE: 72 MMHG | HEIGHT: 75 IN | OXYGEN SATURATION: 99 % | BODY MASS INDEX: 24.87 KG/M2 | HEART RATE: 87 BPM

## 2022-11-10 DIAGNOSIS — I10 PRIMARY HYPERTENSION: Primary | ICD-10-CM

## 2022-11-10 DIAGNOSIS — I25.10 ASCVD (ARTERIOSCLEROTIC CARDIOVASCULAR DISEASE): ICD-10-CM

## 2022-11-10 DIAGNOSIS — I47.1 PSVT (PAROXYSMAL SUPRAVENTRICULAR TACHYCARDIA) (HCC): ICD-10-CM

## 2022-11-10 DIAGNOSIS — I10 ESSENTIAL HYPERTENSION: ICD-10-CM

## 2022-11-10 DIAGNOSIS — Z12.5 SCREENING PSA (PROSTATE SPECIFIC ANTIGEN): ICD-10-CM

## 2022-11-10 DIAGNOSIS — R00.2 PALPITATIONS: ICD-10-CM

## 2022-11-10 DIAGNOSIS — E11.9 TYPE 2 DIABETES MELLITUS WITHOUT COMPLICATION, WITHOUT LONG-TERM CURRENT USE OF INSULIN (HCC): ICD-10-CM

## 2022-11-10 DIAGNOSIS — E78.2 MIXED HYPERLIPIDEMIA: ICD-10-CM

## 2022-11-10 PROCEDURE — 3044F HG A1C LEVEL LT 7.0%: CPT | Performed by: STUDENT IN AN ORGANIZED HEALTH CARE EDUCATION/TRAINING PROGRAM

## 2022-11-10 PROCEDURE — 3078F DIAST BP <80 MM HG: CPT | Performed by: STUDENT IN AN ORGANIZED HEALTH CARE EDUCATION/TRAINING PROGRAM

## 2022-11-10 PROCEDURE — 3017F COLORECTAL CA SCREEN DOC REV: CPT | Performed by: STUDENT IN AN ORGANIZED HEALTH CARE EDUCATION/TRAINING PROGRAM

## 2022-11-10 PROCEDURE — G8427 DOCREV CUR MEDS BY ELIG CLIN: HCPCS | Performed by: STUDENT IN AN ORGANIZED HEALTH CARE EDUCATION/TRAINING PROGRAM

## 2022-11-10 PROCEDURE — 3074F SYST BP LT 130 MM HG: CPT | Performed by: STUDENT IN AN ORGANIZED HEALTH CARE EDUCATION/TRAINING PROGRAM

## 2022-11-10 PROCEDURE — G8484 FLU IMMUNIZE NO ADMIN: HCPCS | Performed by: STUDENT IN AN ORGANIZED HEALTH CARE EDUCATION/TRAINING PROGRAM

## 2022-11-10 PROCEDURE — 1036F TOBACCO NON-USER: CPT | Performed by: STUDENT IN AN ORGANIZED HEALTH CARE EDUCATION/TRAINING PROGRAM

## 2022-11-10 PROCEDURE — 2022F DILAT RTA XM EVC RTNOPTHY: CPT | Performed by: STUDENT IN AN ORGANIZED HEALTH CARE EDUCATION/TRAINING PROGRAM

## 2022-11-10 PROCEDURE — 99214 OFFICE O/P EST MOD 30 MIN: CPT | Performed by: STUDENT IN AN ORGANIZED HEALTH CARE EDUCATION/TRAINING PROGRAM

## 2022-11-10 PROCEDURE — G8417 CALC BMI ABV UP PARAM F/U: HCPCS | Performed by: STUDENT IN AN ORGANIZED HEALTH CARE EDUCATION/TRAINING PROGRAM

## 2022-11-10 PROCEDURE — 1123F ACP DISCUSS/DSCN MKR DOCD: CPT | Performed by: STUDENT IN AN ORGANIZED HEALTH CARE EDUCATION/TRAINING PROGRAM

## 2022-11-10 ASSESSMENT — ENCOUNTER SYMPTOMS
SHORTNESS OF BREATH: 0
ABDOMINAL PAIN: 0
WHEEZING: 0
NAUSEA: 0
SORE THROAT: 0

## 2022-11-10 NOTE — PROGRESS NOTES
11/10/2022    Jess Bone    Chief Complaint   Patient presents with    Follow-up     Routine follow up, pt reports he feels great and has no concerns, had flu vaccine with employer       HPI  History was obtained from patient. Marie Maurer is a 71 y.o. male with a PMHx as listed below who presents today for     No palpitations, was given toprol patient declined. Patient declines referral to EP. He thinks since stopping trulicity palpitations resolved helped. We discussed known psvt seen in the past.     1. Primary hypertension    2. PSVT 6/2 2020 and 1/20/21    3. ASCVD (arteriosclerotic cardiovascular disease)    4. Screening PSA (prostate specific antigen)    5. Type 2 diabetes mellitus without complication, without long-term current use of insulin ([address]    6. Essential hypertension    7. Palpitations    8. Mixed hyperlipidemia             REVIEW OF SYMPTOMS    Review of Systems   Constitutional:  Negative for chills and fatigue. HENT:  Negative for congestion and sore throat. Respiratory:  Negative for shortness of breath and wheezing. Cardiovascular:  Negative for chest pain and palpitations. Gastrointestinal:  Negative for abdominal pain and nausea. Genitourinary:  Negative for frequency and urgency. Neurological:  Negative for light-headedness. PAST MEDICAL HISTORY  Past Medical History:   Diagnosis Date    H/O echocardiogram 10/06/2020    EF55-60%, Mild TR & LVH.     History of exercise stress test 10/06/2020    Treadmill, Normal    Hyperlipidemia     Hypertension     PSVT (paroxysmal supraventricular tachycardia) ([address] 9/24/2020 9/2/2020 ECG at ER at [facility]    Type 2 diabetes mellitus ([address]        FAMILY HISTORY  Family History   Problem Relation Age of Onset    Diabetes Mother     High Cholesterol Mother     Heart Disease Mother     Cancer Father     Diabetes Sister     High Blood Pressure Sister     High Cholesterol Sister     Alcohol Abuse Brother     Heart Disease Brother     Other Sister         brain aneurysym    Heart Disease Sister     No Known Problems Sister     No Known Problems Sister     No Known Problems Sister     No Known Problems Brother        SOCIAL HISTORY  Social History     Socioeconomic History    Marital status: Single     Spouse name: None    Number of children: None    Years of education: None    Highest education level: None   Tobacco Use    Smoking status: Never    Smokeless tobacco: Never   Vaping Use    Vaping Use: Never used   Substance and Sexual Activity    Alcohol use: Not Currently     Comment: 2-3 beers per year/caffeine 2 cups of coffee a week and 4 pops a week    Drug use: No     Social Determinants of Health     Financial Resource Strain: Low Risk     Difficulty of Paying Living Expenses: Not hard at all   Food Insecurity: No Food Insecurity    Worried About Running Out of Food in the Last Year: Never true    Ran Out of Food in the Last Year: Never true   Physical Activity: Inactive    Days of Exercise per Week: 0 days    Minutes of Exercise per Session: 0 min        SURGICAL HISTORY  Past Surgical History:   Procedure Laterality Date    COLONOSCOPY  04/19/2021    COLONOSCOPY N/A 4/19/2021    COLONOSCOPY POLYPECTOMY SNARE/COLD BIOPSY performed by Dorys Alan [facility] at [facility]  Current Outpatient Medications   Medication Sig Dispense Refill    lisinopril (PRINIVIL;ZESTRIL) 10 MG tablet take 1 tablet by mouth once daily 90 tablet 0    SITagliptin (JANUVIA) 100 MG tablet take 1 tablet by mouth once daily 30 tablet 0    metFORMIN (GLUCOPHAGE-XR) 500 MG extended release tablet take 2 tablets by mouth once daily 180 tablet 0    metoprolol succinate (TOPROL XL) 25 MG extended release tablet Take 1 tablet by mouth in the morning. . 90 tablet 3    atorvastatin (LIPITOR) 20 MG tablet Take 1 tablet by mouth in the morning.  90 tablet 0    aspirin 81 MG EC tablet Take 1 tablet by mouth daily 30 tablet 3    ibuprofen (ADVIL;MOTRIN) 800 MG tablet Take 1 tablet by mouth every 6 hours as needed for Pain 120 tablet 0    blood glucose test strips (FREESTYLE LITE) strip 1 each by In Vitro route daily As needed. 100 each 3    ONETOUCH VERIO strip TEST TWICE DAILY AND AS NEEDED 100 strip 5    ONETOUCH DELICA LANCETS FINE MISC by Does not apply route      Glucose Blood (ONETOUCH VERIO VI) by In Vitro route       No current facility-administered medications for this visit. ALLERGIES  No Known Allergies    PHYSICAL EXAM    /72 (Site: Left Upper Arm, Position: Sitting, Cuff Size: Medium Adult)   Pulse 87   Ht 6' 3\" (1.905 m)   Wt 200 lb (90.7 kg)   SpO2 99%   BMI 25.00 kg/m²     Physical Exam  Constitutional:       Appearance: Normal appearance. HENT:      Head: Normocephalic and atraumatic. Eyes:      Extraocular Movements: Extraocular movements intact. Pupils: Pupils are equal, round, and reactive to light. Cardiovascular:      Rate and Rhythm: Normal rate and regular rhythm. Pulses: Normal pulses. Heart sounds: No murmur heard. No friction rub. No gallop. Pulmonary:      Effort: Pulmonary effort is normal.      Breath sounds: Normal breath sounds. Skin:     General: Skin is warm and dry. Neurological:      General: No focal deficit present. Mental Status: He is alert. Psychiatric:         Mood and Affect: Mood normal.         Behavior: Behavior normal.       ASSESSMENT & PLAN    1. Primary hypertension      2. PSVT 6/2 2020 and 1/20/21      3. ASCVD (arteriosclerotic cardiovascular disease)      4. Screening PSA (prostate specific antigen)    - PSA Screening; Future    5. Type 2 diabetes mellitus without complication, without long-term current use of insulin (HCC)    - Hemoglobin A1C; Future    6. Essential hypertension    - Comprehensive Metabolic Panel; Future    7. Palpitations    - TSH with Reflex to FT4; Future    8. Mixed hyperlipidemia    - LIPID PANEL;  Future    F/u labs 6 months  Bp stable  Dm2 stable  PSVT seen multiple times no palpitationssince last appt. Patient does not want further eval/ EP referral     Return in about 6 months (around 5/10/2023).          Electronically signed by Ayush Nguyen DO on 11/10/2022

## 2023-01-13 DIAGNOSIS — E78.2 MIXED HYPERLIPIDEMIA: ICD-10-CM

## 2023-01-13 RX ORDER — ATORVASTATIN CALCIUM 20 MG/1
20 TABLET, FILM COATED ORAL DAILY
Qty: 90 TABLET | Refills: 1 | Status: SHIPPED | OUTPATIENT
Start: 2023-01-13 | End: 2023-07-12

## 2023-02-08 ENCOUNTER — OFFICE VISIT (OUTPATIENT)
Dept: CARDIOLOGY CLINIC | Age: 73
End: 2023-02-08
Payer: MEDICARE

## 2023-02-08 VITALS
HEIGHT: 75 IN | DIASTOLIC BLOOD PRESSURE: 70 MMHG | SYSTOLIC BLOOD PRESSURE: 128 MMHG | OXYGEN SATURATION: 99 % | BODY MASS INDEX: 25.54 KG/M2 | HEART RATE: 89 BPM | WEIGHT: 205.4 LBS

## 2023-02-08 DIAGNOSIS — I10 PRIMARY HYPERTENSION: ICD-10-CM

## 2023-02-08 DIAGNOSIS — I47.1 PSVT (PAROXYSMAL SUPRAVENTRICULAR TACHYCARDIA) (HCC): ICD-10-CM

## 2023-02-08 DIAGNOSIS — I25.10 ASCVD (ARTERIOSCLEROTIC CARDIOVASCULAR DISEASE): Primary | ICD-10-CM

## 2023-02-08 DIAGNOSIS — E78.00 PURE HYPERCHOLESTEROLEMIA: ICD-10-CM

## 2023-02-08 DIAGNOSIS — E11.00 TYPE 2 DIABETES MELLITUS WITH HYPEROSMOLARITY WITHOUT COMA, WITHOUT LONG-TERM CURRENT USE OF INSULIN (HCC): ICD-10-CM

## 2023-02-08 PROCEDURE — G8484 FLU IMMUNIZE NO ADMIN: HCPCS | Performed by: INTERNAL MEDICINE

## 2023-02-08 PROCEDURE — 3078F DIAST BP <80 MM HG: CPT | Performed by: INTERNAL MEDICINE

## 2023-02-08 PROCEDURE — 99214 OFFICE O/P EST MOD 30 MIN: CPT | Performed by: INTERNAL MEDICINE

## 2023-02-08 PROCEDURE — 3074F SYST BP LT 130 MM HG: CPT | Performed by: INTERNAL MEDICINE

## 2023-02-08 PROCEDURE — 2022F DILAT RTA XM EVC RTNOPTHY: CPT | Performed by: INTERNAL MEDICINE

## 2023-02-08 PROCEDURE — 1123F ACP DISCUSS/DSCN MKR DOCD: CPT | Performed by: INTERNAL MEDICINE

## 2023-02-08 PROCEDURE — G8417 CALC BMI ABV UP PARAM F/U: HCPCS | Performed by: INTERNAL MEDICINE

## 2023-02-08 PROCEDURE — 3017F COLORECTAL CA SCREEN DOC REV: CPT | Performed by: INTERNAL MEDICINE

## 2023-02-08 PROCEDURE — G8427 DOCREV CUR MEDS BY ELIG CLIN: HCPCS | Performed by: INTERNAL MEDICINE

## 2023-02-08 PROCEDURE — 1036F TOBACCO NON-USER: CPT | Performed by: INTERNAL MEDICINE

## 2023-02-08 PROCEDURE — 3046F HEMOGLOBIN A1C LEVEL >9.0%: CPT | Performed by: INTERNAL MEDICINE

## 2023-02-08 RX ORDER — LISINOPRIL 10 MG/1
TABLET ORAL
Qty: 90 TABLET | Refills: 0 | Status: SHIPPED | OUTPATIENT
Start: 2023-02-08

## 2023-02-08 NOTE — PROGRESS NOTES
Maria G Forbes  1950  Tg Quinn DO      Chief Complaint   Patient presents with    6 Month Follow-Up    Dizziness     When getting up quick    Shortness of Breath     On exertion     Chief complaint and HPI:  Maria G Forbes  is a 71 y.o. male following up for hypertension and hyperlipidemia and PSVT and also has type 2 diabetes. He denies any cardiac symptoms today. Had not had any recurrence of SVT since last seen last July. 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 Medications are reviewed. He does not smoke or drink caffeine. Rest of the Cardiovascular system review is otherwise unchanged from prior encounter. Past medical history:  has a past medical history of H/O echocardiogram, History of exercise stress test, Hyperlipidemia, Hypertension, PSVT (paroxysmal supraventricular tachycardia) ([facility] and Type 2 diabetes mellitus ([facility] Past surgical history:  has a past surgical history that includes Colonoscopy (04/19/2021) and Colonoscopy (N/A, 4/19/2021). Social History:   Social History     Tobacco Use    Smoking status: Never    Smokeless tobacco: Never   Substance Use Topics    Alcohol use: Not Currently     Comment: 2-3 beers per year/caffeine 3 cups of decaf a week     Family history: family history includes Alcohol Abuse in his brother; Cancer in his father; Diabetes in his mother and sister; Heart Disease in his brother, mother, and sister; High Blood Pressure in his sister; High Cholesterol in his mother and sister; No Known Problems in his brother, sister, sister, and sister; Other in his sister. ALLERGIES:  Patient has no known allergies. Prior to Admission medications    Medication Sig Start Date End Date Taking?  Authorizing Provider   lisinopril (PRINIVIL;ZESTRIL) 10 MG tablet take 1 tablet by mouth once daily 2/8/23  Yes Jason Chambers MD   atorvastatin (LIPITOR) 20 MG tablet Take 1 tablet by mouth daily 1/13/23 7/12/23 Yes Joce Rodríguez, DO   SITagliptin (JANUVIA) 100 MG tablet take 1 tablet by mouth once daily 1/13/23 7/13/23 Yes Joce Rodríguez DO   metFORMIN (GLUCOPHAGE-XR) 500 MG extended release tablet take 2 tablets by mouth once daily 9/1/22  Yes Joce Rodríguez, DO   aspirin 81 MG EC tablet Take 1 tablet by mouth daily 11/29/21  Yes Wayne Varela MD   blood glucose test strips (FREESTYLE LITE) strip 1 each by In Vitro route daily As needed. 1/4/21  Yes Wayne Varela MD   ONETOUCH VERIO strip TEST TWICE DAILY AND AS NEEDED 12/28/20  Yes Wayne Varela MD   Floreen Santiago LANCETS FINE MISC by Does not apply route   Yes Historical Provider, MD   Glucose Blood (ONETOUCH VERIO VI) by In Vitro route   Yes Historical Provider, MD   metoprolol succinate (TOPROL XL) 25 MG extended release tablet Take 1 tablet by mouth in the morning. .  Patient not taking: Reported on 2/8/2023 7/20/22   Jacob Banuelos MD   ibuprofen (ADVIL;MOTRIN) 800 MG tablet Take 1 tablet by mouth every 6 hours as needed for Pain  Patient not taking: Reported on 2/8/2023 1/21/21   Jhonatan Booker PA-C     Vitals:    02/08/23 0920   BP: 128/70   Site: Left Upper Arm   Position: Sitting   Cuff Size: Medium Adult   Pulse: 89   SpO2: 99%   Weight: 205 lb 6.4 oz (93.2 kg)   Height: 6' 3\" (1.905 m)      Body mass index is 25.67 kg/m². Wt Readings from Last 3 Encounters:   02/08/23 205 lb 6.4 oz (93.2 kg)   11/10/22 200 lb (90.7 kg)   07/20/22 202 lb 6.4 oz (91.8 kg)     Constitutional:  Patient is normally built and nourished male in no apparent distress. Eyes: Pupils are equal no conjunctival pallor noted. NECK: No JVP or thyromegaly  Cardiovascular: Auscultation: Normal S1 and S2. No murmurs or gallops noted. Carotids are negative for bruits. Peripheral pulses: Pedal pulses are equal and 1+ in both feet. Respiratory:  Respiratory effort is normal. Breath sounds are clear to auscultation.   Extremities:  No edema, clubbing,  Cyanosis, petechiae. SKIN: Warm and well perfused, no pallor or cyanosis  Neurologic:  Oriented to time, place, and person and non-anxious. No focal neurological deficit noted. Psychiatric: Normal mood and effect. LAB REVIEW:  CBC:   Lab Results   Component Value Date/Time    WBC 7.7 05/14/2022 09:58 AM    HGB 14.6 05/14/2022 09:58 AM    HCT 48.0 05/14/2022 09:58 AM     05/14/2022 09:58 AM     Lipids:   Lab Results   Component Value Date    CHOL 114 02/20/2021    TRIG 65 02/20/2021    HDL 44 02/20/2021    LDLCALC 50 09/13/2019     Renal:   Lab Results   Component Value Date/Time    BUN 12 07/15/2022 02:54 PM    CREATININE 0.9 07/15/2022 02:54 PM     07/15/2022 02:54 PM    K 4.5 07/15/2022 02:54 PM     PT/INR:   Lab Results   Component Value Date/Time    INR 0.89 02/19/2021 02:50 PM     Lab Results   Component Value Date    LABA1C 6.8 07/15/2022     IMPRESSION and RECOMMENDATIONS:      1. ASCVD (arteriosclerotic cardiovascular disease)  Assessment & Plan:  Patient is asymptomatic. Continue aggressive risk factor modification for primary prevention. 2. PSVT (paroxysmal supraventricular tachycardia) (HCC)  Assessment & Plan:  Had not had much recurrence. He is on beta-blocker therapy. Patient is asked to have an EKG done if he has any recurrence and bring it to us. Has seen EP in the past.  We will consider EP evaluation again if he has recurrent frequent PSVT. 3. Type 2 diabetes mellitus with hyperosmolarity without coma, without long-term current use of insulin ([facility]  Assessment & Plan:  Well-controlled with last hemoglobin A1c of 6.8. He is due to have more labs done in May he follows up with PCP for this. 4. Pure hypercholesterolemia  Assessment & Plan:  Last lipids are from 2021 we will repeat lipids and LFTs. Patient is on Lipitor 20 mg daily. Orders:  -     Lipid Panel; Standing  5. Primary hypertension  Assessment & Plan:  Well-controlled on Toprol.   Continue the same.     Continue current cardiovascular medications which have been reviewed and discussed individually with you. Check lipids. Appropriate prescriptions if needed on this visit are addressed. After visit summery is provided. Questions answered and patient verbalizes understanding. Follow up in 12 months with ECG,  sooner if needed. Chris Varghese MD, 2/8/2023 9:39 AM     Please note this report has been partially produced using speech recognition software and may contain errors related to that system including errors in grammar, punctuation, and spelling, as well as words and phrases that may be inappropriate. If there are any questions or concerns please feel free to contact the dictating provider for clarification.

## 2023-02-08 NOTE — PATIENT INSTRUCTIONS
Continue current cardiovascular medications which have been reviewed and discussed individually with you. Check lipids. Appropriate prescriptions if needed on this visit are addressed. After visit summery is provided. Questions answered and patient verbalizes understanding. Follow up in 12 months with ECG,  sooner if needed.

## 2023-02-08 NOTE — ASSESSMENT & PLAN NOTE
Had not had much recurrence. He is on beta-blocker therapy. Patient is asked to have an EKG done if he has any recurrence and bring it to us. Has seen EP in the past.  We will consider EP evaluation again if he has recurrent frequent PSVT.

## 2023-02-08 NOTE — ASSESSMENT & PLAN NOTE
Well-controlled with last hemoglobin A1c of 6.8. He is due to have more labs done in May he follows up with PCP for this.

## 2023-03-06 RX ORDER — METFORMIN HYDROCHLORIDE 500 MG/1
TABLET, EXTENDED RELEASE ORAL
Qty: 180 TABLET | Refills: 0 | OUTPATIENT
Start: 2023-03-06

## 2023-03-06 RX ORDER — METFORMIN HYDROCHLORIDE 500 MG/1
TABLET, EXTENDED RELEASE ORAL
Qty: 180 TABLET | Refills: 0 | Status: SHIPPED | OUTPATIENT
Start: 2023-03-06

## 2023-05-08 RX ORDER — LISINOPRIL 10 MG/1
10 TABLET ORAL DAILY
Qty: 90 TABLET | Refills: 3 | Status: SHIPPED | OUTPATIENT
Start: 2023-05-08

## 2023-05-16 DIAGNOSIS — E78.2 MIXED HYPERLIPIDEMIA: ICD-10-CM

## 2023-05-16 DIAGNOSIS — R00.2 PALPITATIONS: ICD-10-CM

## 2023-05-16 DIAGNOSIS — Z12.5 SCREENING PSA (PROSTATE SPECIFIC ANTIGEN): ICD-10-CM

## 2023-05-16 DIAGNOSIS — I10 ESSENTIAL HYPERTENSION: ICD-10-CM

## 2023-05-16 DIAGNOSIS — E11.9 TYPE 2 DIABETES MELLITUS WITHOUT COMPLICATION, WITHOUT LONG-TERM CURRENT USE OF INSULIN (HCC): ICD-10-CM

## 2023-05-16 LAB
ALBUMIN SERPL-MCNC: 4.5 G/DL (ref 3.4–5)
ALBUMIN/GLOB SERPL: 1.7 {RATIO} (ref 1.1–2.2)
ALP SERPL-CCNC: 81 U/L (ref 40–129)
ALT SERPL-CCNC: 22 U/L (ref 10–40)
ANION GAP SERPL CALCULATED.3IONS-SCNC: 12 MMOL/L (ref 3–16)
AST SERPL-CCNC: 22 U/L (ref 15–37)
BILIRUB SERPL-MCNC: 0.9 MG/DL (ref 0–1)
BUN SERPL-MCNC: 10 MG/DL (ref 7–20)
CALCIUM SERPL-MCNC: 9.2 MG/DL (ref 8.3–10.6)
CHLORIDE SERPL-SCNC: 101 MMOL/L (ref 99–110)
CHOLEST SERPL-MCNC: 123 MG/DL (ref 0–199)
CO2 SERPL-SCNC: 28 MMOL/L (ref 21–32)
CREAT SERPL-MCNC: 1.1 MG/DL (ref 0.8–1.3)
GFR SERPLBLD CREATININE-BSD FMLA CKD-EPI: >60 ML/MIN/{1.73_M2}
GLUCOSE SERPL-MCNC: 111 MG/DL (ref 70–99)
HDLC SERPL-MCNC: 46 MG/DL (ref 40–60)
LDLC SERPL CALC-MCNC: 65 MG/DL
POTASSIUM SERPL-SCNC: 4.6 MMOL/L (ref 3.5–5.1)
PROT SERPL-MCNC: 7.2 G/DL (ref 6.4–8.2)
PSA SERPL DL<=0.01 NG/ML-MCNC: 0.35 NG/ML (ref 0–4)
SODIUM SERPL-SCNC: 141 MMOL/L (ref 136–145)
TRIGL SERPL-MCNC: 62 MG/DL (ref 0–150)
TSH SERPL DL<=0.005 MIU/L-ACNC: 1.73 UIU/ML (ref 0.27–4.2)
VLDLC SERPL CALC-MCNC: 12 MG/DL

## 2023-05-17 LAB
EST. AVERAGE GLUCOSE BLD GHB EST-MCNC: 142.7 MG/DL
HBA1C MFR BLD: 6.6 %

## 2023-05-22 ENCOUNTER — TELEPHONE (OUTPATIENT)
Dept: FAMILY MEDICINE CLINIC | Age: 73
End: 2023-05-22

## 2023-05-23 ENCOUNTER — TELEPHONE (OUTPATIENT)
Dept: FAMILY MEDICINE CLINIC | Age: 73
End: 2023-05-23

## 2023-05-31 RX ORDER — METFORMIN HYDROCHLORIDE 500 MG/1
TABLET, EXTENDED RELEASE ORAL
Qty: 180 TABLET | Refills: 0 | Status: SHIPPED | OUTPATIENT
Start: 2023-05-31

## 2023-06-01 ENCOUNTER — HOSPITAL ENCOUNTER (EMERGENCY)
Age: 73
Discharge: HOME | End: 2023-06-01
Payer: MEDICARE

## 2023-06-01 VITALS
DIASTOLIC BLOOD PRESSURE: 91 MMHG | OXYGEN SATURATION: 98 % | RESPIRATION RATE: 20 BRPM | SYSTOLIC BLOOD PRESSURE: 139 MMHG | HEART RATE: 83 BPM | TEMPERATURE: 99 F

## 2023-06-01 DIAGNOSIS — S81.812A SKIN TEAR OF LEFT LOWER LEG WITHOUT COMPLICATION, INITIAL ENCOUNTER: Primary | ICD-10-CM

## 2023-06-01 PROCEDURE — 99283 EMERGENCY DEPT VISIT LOW MDM: CPT

## 2023-06-01 PROCEDURE — 6370000000 HC RX 637 (ALT 250 FOR IP): Performed by: NURSE PRACTITIONER

## 2023-06-01 RX ORDER — GINSENG 100 MG
CAPSULE ORAL ONCE
Status: COMPLETED | OUTPATIENT
Start: 2023-06-01 | End: 2023-06-01

## 2023-06-01 RX ADMIN — BACITRACIN: 500 OINTMENT TOPICAL at 14:52

## 2023-06-01 ASSESSMENT — ENCOUNTER SYMPTOMS
COLOR CHANGE: 0
CHEST TIGHTNESS: 0

## 2023-06-01 ASSESSMENT — LIFESTYLE VARIABLES
HOW OFTEN DO YOU HAVE A DRINK CONTAINING ALCOHOL: MONTHLY OR LESS
HOW MANY STANDARD DRINKS CONTAINING ALCOHOL DO YOU HAVE ON A TYPICAL DAY: 1 OR 2

## 2023-06-01 NOTE — ED PROVIDER NOTES
[address] ENCOUNTER      Pt Name: Leigh Gonzalez  MRN: [id number]  [place] 1950  Date of evaluation: 6/1/2023  Provider: SONNY Raines - CNP  PCP: Jose M Padilla DO  Note Started: 1:30 PM EDT 6/1/23    I am the Primary Clinician of Record. MILAD. I have evaluated this patient. CHIEF COMPLAINT       Chief Complaint   Patient presents with    Leg Injury     Hit in shin with . Bleeding controlled at this time. Mild swelling noted. HISTORY OF PRESENT ILLNESS: 1 or more Elements   Leigh Gonzalez is a 71 y.o. male who presents to the ER with chief complaint of wound to the left lower shin that he sustained while he was using a  today. He came to the emergency department for concern of the wound because he is diabetic and wanted to make sure that there is no concern of infection. Information obtained from patient. I have reviewed the nursing triage documentation and agree unless otherwise noted. REVIEW OF SYSTEMS :    Review of Systems   Respiratory:  Negative for chest tightness. Cardiovascular:  Negative for chest pain and leg swelling. Skin:  Positive for wound (Left lower leg). Negative for color change and rash. Psychiatric/Behavioral:  Negative for confusion. Positives and Pertinent negatives as per HPI. SURGICAL HISTORY     Past Surgical History:   Procedure Laterality Date    COLONOSCOPY  04/19/2021    COLONOSCOPY N/A 4/19/2021    COLONOSCOPY POLYPECTOMY SNARE/COLD BIOPSY performed by Kirsten Perez MD at [address]       Previous Medications    ASPIRIN 81 MG EC TABLET    Take 1 tablet by mouth daily    ATORVASTATIN (LIPITOR) 20 MG TABLET    Take 1 tablet by mouth daily    BLOOD GLUCOSE TEST STRIPS (FREESTYLE LITE) STRIP    1 each by In Vitro route daily As needed.     GLUCOSE BLOOD (ONETOUCH VERIO VI)    by In Vitro

## 2023-06-01 NOTE — DISCHARGE INSTRUCTIONS
It was a pleasure to treat you today. As we discussed please keep the wound clean and dry. Wash with soap and warm water and pat dry. You may use Neosporin or triple antibiotic ointment as you wish. Keep the wound covered if you are working in a dirty environment.   Return to the emergency department or follow-up with your primary care provider for any signs of infection

## 2023-06-22 ENCOUNTER — TELEMEDICINE (OUTPATIENT)
Dept: FAMILY MEDICINE CLINIC | Age: 73
End: 2023-06-22
Payer: MEDICARE

## 2023-06-22 DIAGNOSIS — Z00.00 MEDICARE ANNUAL WELLNESS VISIT, SUBSEQUENT: Primary | ICD-10-CM

## 2023-06-22 PROCEDURE — 1123F ACP DISCUSS/DSCN MKR DOCD: CPT | Performed by: STUDENT IN AN ORGANIZED HEALTH CARE EDUCATION/TRAINING PROGRAM

## 2023-06-22 PROCEDURE — G0439 PPPS, SUBSEQ VISIT: HCPCS | Performed by: STUDENT IN AN ORGANIZED HEALTH CARE EDUCATION/TRAINING PROGRAM

## 2023-06-22 PROCEDURE — 3017F COLORECTAL CA SCREEN DOC REV: CPT | Performed by: STUDENT IN AN ORGANIZED HEALTH CARE EDUCATION/TRAINING PROGRAM

## 2023-06-22 SDOH — ECONOMIC STABILITY: INCOME INSECURITY: HOW HARD IS IT FOR YOU TO PAY FOR THE VERY BASICS LIKE FOOD, HOUSING, MEDICAL CARE, AND HEATING?: NOT HARD AT ALL

## 2023-06-22 SDOH — ECONOMIC STABILITY: FOOD INSECURITY: WITHIN THE PAST 12 MONTHS, YOU WORRIED THAT YOUR FOOD WOULD RUN OUT BEFORE YOU GOT MONEY TO BUY MORE.: NEVER TRUE

## 2023-06-22 SDOH — ECONOMIC STABILITY: HOUSING INSECURITY
IN THE LAST 12 MONTHS, WAS THERE A TIME WHEN YOU DID NOT HAVE A STEADY PLACE TO SLEEP OR SLEPT IN A SHELTER (INCLUDING NOW)?: NO

## 2023-06-22 SDOH — ECONOMIC STABILITY: FOOD INSECURITY: WITHIN THE PAST 12 MONTHS, THE FOOD YOU BOUGHT JUST DIDN'T LAST AND YOU DIDN'T HAVE MONEY TO GET MORE.: NEVER TRUE

## 2023-06-22 ASSESSMENT — PATIENT HEALTH QUESTIONNAIRE - PHQ9
SUM OF ALL RESPONSES TO PHQ QUESTIONS 1-9: 2
SUM OF ALL RESPONSES TO PHQ9 QUESTIONS 1 & 2: 2
SUM OF ALL RESPONSES TO PHQ QUESTIONS 1-9: 2
1. LITTLE INTEREST OR PLEASURE IN DOING THINGS: 1
2. FEELING DOWN, DEPRESSED OR HOPELESS: 1
SUM OF ALL RESPONSES TO PHQ QUESTIONS 1-9: 2
SUM OF ALL RESPONSES TO PHQ QUESTIONS 1-9: 2

## 2023-06-22 NOTE — PATIENT INSTRUCTIONS
Personalized Preventive Plan for Devi Liriano - 6/22/2023  Medicare offers a range of preventive health benefits. Some of the tests and screenings are paid in full while other may be subject to a deductible, co-insurance, and/or copay. Some of these benefits include a comprehensive review of your medical history including lifestyle, illnesses that may run in your family, and various assessments and screenings as appropriate. After reviewing your medical record and screening and assessments performed today your provider may have ordered immunizations, labs, imaging, and/or referrals for you. A list of these orders (if applicable) as well as your Preventive Care list are included within your After Visit Summary for your review. Other Preventive Recommendations:    A preventive eye exam performed by an eye specialist is recommended every 1-2 years to screen for glaucoma; cataracts, macular degeneration, and other eye disorders. A preventive dental visit is recommended every 6 months. Try to get at least 150 minutes of exercise per week or 10,000 steps per day on a pedometer . Order or download the FREE \"Exercise & Physical Activity: Your Everyday Guide\" from [employer]. Call 3-127.448.1149 or search [employer] online. You need 0695-9103 mg of calcium and 8065-9567 IU of vitamin D per day. It is possible to meet your calcium requirement with diet alone, but a vitamin D supplement is usually necessary to meet this goal.  When exposed to the sun, use a sunscreen that protects against both UVA and UVB radiation with an SPF of 30 or greater. Reapply every 2 to 3 hours or after sweating, drying off with a towel, or swimming. Always wear a seat belt when traveling in a car. Always wear a helmet when riding a bicycle or motorcycle.

## 2023-06-22 NOTE — PROGRESS NOTES
daily take 1 tablet by mouth once daily Yes Erin Christianson MD   atorvastatin (LIPITOR) 20 MG tablet Take 1 tablet by mouth daily Yes Joce Rodríguez DO   SITagliptin (JANUVIA) 100 MG tablet take 1 tablet by mouth once daily Yes Joce Rodríguez DO   metoprolol succinate (TOPROL XL) 25 MG extended release tablet Take 1 tablet by mouth in the morning. Humaira Youngblood MD   aspirin 81 MG EC tablet Take 1 tablet by mouth daily Yes Olivia Cuadra MD   blood glucose test strips (FREESTYLE LITE) strip 1 each by In Vitro route daily As needed. Yes Olivia Cuadra MD   ONETOUCH VERIO strip TEST TWICE DAILY AND AS NEEDED Yes Olivia Cuadra MD   Jose Colder LANCETS FINE MISC by Does not apply route Yes Historical Provider, MD   Glucose Blood (Silvia Marrow VI) by In Vitro route Yes Historical Provider, MD   ibuprofen (ADVIL;MOTRIN) 800 MG tablet Take 1 tablet by mouth every 6 hours as needed for Pain  Patient not taking: Reported on 2/8/2023  JOYCE Frazier (Including outside providers/suppliers regularly involved in providing care):   Patient Care Team:  Jaylin Apple DO as PCP - General (Family Medicine)  Jaylin Apple DO as PCP - Empaneled Provider  Christine Reyes RN as Ambulatory Care Manager     Reviewed and updated this visit:  Allergies  Meds  Med Hx  Surg Hx  Soc Hx  Fam Hx      I, Kenny Drake LPN, 7/21/4142, performed the documented evaluation under the direct supervision of the attending physician. Samantha Soares, was evaluated through a synchronous (real-time) audio encounter. The patient (or guardian if applicable) is aware that this is a billable service, which includes applicable co-pays. This Virtual Visit was conducted with patient's (and/or legal guardian's) consent. Patient identification was verified, and a caregiver was present when appropriate.    The patient was located at Home: [address] [place] [address] [address]  [address] [id number]  Provider was located at

## 2023-07-05 ENCOUNTER — HOSPITAL ENCOUNTER (EMERGENCY)
Age: 73
Discharge: HOME | End: 2023-07-05
Attending: EMERGENCY MEDICINE
Payer: MEDICARE

## 2023-07-05 ENCOUNTER — APPOINTMENT (OUTPATIENT)
Dept: GENERAL RADIOLOGY | Age: 73
End: 2023-07-05
Payer: MEDICARE

## 2023-07-05 VITALS
OXYGEN SATURATION: 96 % | DIASTOLIC BLOOD PRESSURE: 73 MMHG | HEART RATE: 83 BPM | HEIGHT: 75 IN | SYSTOLIC BLOOD PRESSURE: 112 MMHG | RESPIRATION RATE: 16 BRPM | BODY MASS INDEX: 24.87 KG/M2 | WEIGHT: 200 LBS | TEMPERATURE: 98.3 F

## 2023-07-05 DIAGNOSIS — N17.9 ACUTE KIDNEY INJURY (HCC): ICD-10-CM

## 2023-07-05 DIAGNOSIS — I47.1 PAROXYSMAL SUPRAVENTRICULAR TACHYCARDIA (HCC): Primary | ICD-10-CM

## 2023-07-05 LAB
ALBUMIN SERPL-MCNC: 4.6 GM/DL (ref 3.4–5)
ALP BLD-CCNC: 81 IU/L (ref 40–129)
ALT SERPL-CCNC: 27 U/L (ref 10–40)
ANION GAP SERPL CALCULATED.3IONS-SCNC: 11 MMOL/L (ref 4–16)
AST SERPL-CCNC: 24 IU/L (ref 15–37)
BASOPHILS ABSOLUTE: 0 K/CU MM
BASOPHILS RELATIVE PERCENT: 0.3 % (ref 0–1)
BILIRUB SERPL-MCNC: 0.6 MG/DL (ref 0–1)
BUN SERPL-MCNC: 18 MG/DL (ref 6–23)
CALCIUM SERPL-MCNC: 9.4 MG/DL (ref 8.3–10.6)
CHLORIDE BLD-SCNC: 99 MMOL/L (ref 99–110)
CO2: 24 MMOL/L (ref 21–32)
CREAT SERPL-MCNC: 1.4 MG/DL (ref 0.9–1.3)
DIFFERENTIAL TYPE: ABNORMAL
EKG ATRIAL RATE: 147 BPM
EKG ATRIAL RATE: 84 BPM
EKG DIAGNOSIS: NORMAL
EKG DIAGNOSIS: NORMAL
EKG P AXIS: 42 DEGREES
EKG P-R INTERVAL: 176 MS
EKG Q-T INTERVAL: 284 MS
EKG Q-T INTERVAL: 330 MS
EKG QRS DURATION: 84 MS
EKG QRS DURATION: 86 MS
EKG QTC CALCULATION (BAZETT): 389 MS
EKG QTC CALCULATION (BAZETT): 444 MS
EKG R AXIS: 183 DEGREES
EKG R AXIS: 34 DEGREES
EKG T AXIS: 117 DEGREES
EKG T AXIS: 47 DEGREES
EKG VENTRICULAR RATE: 147 BPM
EKG VENTRICULAR RATE: 84 BPM
EOSINOPHILS ABSOLUTE: 0.1 K/CU MM
EOSINOPHILS RELATIVE PERCENT: 0.5 % (ref 0–3)
GFR SERPL CREATININE-BSD FRML MDRD: 54 ML/MIN/1.73M2
GLUCOSE SERPL-MCNC: 165 MG/DL (ref 70–99)
HCT VFR BLD CALC: 47.6 % (ref 42–52)
HEMOGLOBIN: 14.8 GM/DL (ref 13.5–18)
IMMATURE NEUTROPHIL %: 0.2 % (ref 0–0.43)
LYMPHOCYTES ABSOLUTE: 1.4 K/CU MM
LYMPHOCYTES RELATIVE PERCENT: 14.8 % (ref 24–44)
MAGNESIUM: 1.9 MG/DL (ref 1.8–2.4)
MCH RBC QN AUTO: 25.9 PG (ref 27–31)
MCHC RBC AUTO-ENTMCNC: 31.1 % (ref 32–36)
MCV RBC AUTO: 83.2 FL (ref 78–100)
MONOCYTES ABSOLUTE: 0.8 K/CU MM
MONOCYTES RELATIVE PERCENT: 7.9 % (ref 0–4)
NUCLEATED RBC %: 0 %
PDW BLD-RTO: 13.1 % (ref 11.7–14.9)
PLATELET # BLD: 288 K/CU MM (ref 140–440)
PMV BLD AUTO: 9.3 FL (ref 7.5–11.1)
POTASSIUM SERPL-SCNC: 4.6 MMOL/L (ref 3.5–5.1)
PRO-BNP: <5 PG/ML
RBC # BLD: 5.72 M/CU MM (ref 4.6–6.2)
SEGMENTED NEUTROPHILS ABSOLUTE COUNT: 7.2 K/CU MM
SEGMENTED NEUTROPHILS RELATIVE PERCENT: 76.3 % (ref 36–66)
SODIUM BLD-SCNC: 134 MMOL/L (ref 135–145)
TOTAL IMMATURE NEUTOROPHIL: 0.02 K/CU MM
TOTAL NUCLEATED RBC: 0 K/CU MM
TOTAL PROTEIN: 7.4 GM/DL (ref 6.4–8.2)
TROPONIN T: <0.01 NG/ML
WBC # BLD: 9.5 K/CU MM (ref 4–10.5)

## 2023-07-05 PROCEDURE — 96374 THER/PROPH/DIAG INJ IV PUSH: CPT

## 2023-07-05 PROCEDURE — 71045 X-RAY EXAM CHEST 1 VIEW: CPT

## 2023-07-05 PROCEDURE — 93005 ELECTROCARDIOGRAM TRACING: CPT | Performed by: EMERGENCY MEDICINE

## 2023-07-05 PROCEDURE — 93010 ELECTROCARDIOGRAM REPORT: CPT | Performed by: INTERNAL MEDICINE

## 2023-07-05 PROCEDURE — 80053 COMPREHEN METABOLIC PANEL: CPT

## 2023-07-05 PROCEDURE — 2500000003 HC RX 250 WO HCPCS: Performed by: EMERGENCY MEDICINE

## 2023-07-05 PROCEDURE — 99285 EMERGENCY DEPT VISIT HI MDM: CPT

## 2023-07-05 PROCEDURE — 84484 ASSAY OF TROPONIN QUANT: CPT

## 2023-07-05 PROCEDURE — 85025 COMPLETE CBC W/AUTO DIFF WBC: CPT

## 2023-07-05 PROCEDURE — 83735 ASSAY OF MAGNESIUM: CPT

## 2023-07-05 PROCEDURE — 83880 ASSAY OF NATRIURETIC PEPTIDE: CPT

## 2023-07-05 PROCEDURE — 2580000003 HC RX 258: Performed by: EMERGENCY MEDICINE

## 2023-07-05 PROCEDURE — 96361 HYDRATE IV INFUSION ADD-ON: CPT

## 2023-07-05 RX ORDER — 0.9 % SODIUM CHLORIDE 0.9 %
1000 INTRAVENOUS SOLUTION INTRAVENOUS ONCE
Status: COMPLETED | OUTPATIENT
Start: 2023-07-05 | End: 2023-07-05

## 2023-07-05 RX ORDER — DILTIAZEM HYDROCHLORIDE 5 MG/ML
10 INJECTION INTRAVENOUS ONCE
Status: DISCONTINUED | OUTPATIENT
Start: 2023-07-05 | End: 2023-07-05

## 2023-07-05 RX ORDER — DILTIAZEM HYDROCHLORIDE 5 MG/ML
20 INJECTION INTRAVENOUS ONCE
Status: COMPLETED | OUTPATIENT
Start: 2023-07-05 | End: 2023-07-05

## 2023-07-05 RX ADMIN — DILTIAZEM HYDROCHLORIDE 20 MG: 5 INJECTION INTRAVENOUS at 14:12

## 2023-07-05 RX ADMIN — SODIUM CHLORIDE 1000 ML: 9 INJECTION, SOLUTION INTRAVENOUS at 14:11

## 2023-07-05 NOTE — DISCHARGE INSTRUCTIONS
Follow-up with your cardiologist Dr. Keanu Aguero in 1 to 2 days for reevaluation of SVT tachycardia. Call in the morning for an appointment. Follow-up with primary care physician Dr. Salomon Lagunas 1 to 2 days for repeat BMP or creatinine level for acute kidney injury. Call for an appointment  Take all medication prescribed and directed  Return to the emergency department immediately any increased heart rate or any chest pain shortness of breath or any worsening symptoms.

## 2023-07-05 NOTE — ED NOTES
Patient discharged to home at this time. Discharge instructions and follow up care discussed, patient voices understanding.        Emanuel Parekh RN  07/05/23 045

## 2023-07-05 NOTE — ED NOTES
Patient converted from SVT. HR now 90. EKG obtained and given to Dr. Deloris Coombs.       Khadra Kirkland, VALENTINE  07/05/23 5788

## 2023-07-05 NOTE — ED PROVIDER NOTES
any worsening symptoms. He is to follow-up with his cardiologist in 1 to 2 days for reevaluation. Patient follow his primary care physician Dr. Alyson Arroyo in 1 to 2 days for evaluation of acute kidney injury creatinine was 1.4 today. Need to repeat creatinine level or BMP level. Patient voiced understanding agrees above treatment plan. Discussed with patient increase p.o. fluids as well. All questions answered. Clinical Impression:  1. Paroxysmal supraventricular tachycardia ([address])    2. Acute kidney injury [facility])      Disposition referral (if applicable): Serena Che MD  [address] [facility] [address]  [phone]    Schedule an appointment as soon as possible for a visit in 1 day  For evaluation of SVT. Call for an appointment    [address] Coppola, DO  [address] [facility] [facility]  [phone]    Schedule an appointment as soon as possible for a visit in 1 day  For evaluation of acute kidney injury and repeat BMP for creatinine level in 1 to 2 days. Call for an appointment. [facility]  [facility]  [phone]  Go in 1 day  If symptoms worsen    Disposition medications (if applicable):  New Prescriptions    No medications on file     ED Provider Disposition Time  DISPOSITION Decision To Discharge 07/05/2023 04:03:14 PM    CRITICAL CARE NOTE:  There was a high probability of clinically significant life-threatening deterioration of the patient's condition requiring my urgent intervention due to SVT. Cardizem 20mg IV bolus was performed to address this. Total critical care time is  30 minutes. This includes vital sign monitoring, pulse oximetry monitoring, telemetry monitoring, clinical response to the IV medications, reviewing the nursing notes, consultation time, dictation/documentation time, and interpretation of the lab work.  This time excludes time spent performing

## 2023-07-18 ENCOUNTER — OFFICE VISIT (OUTPATIENT)
Dept: FAMILY MEDICINE CLINIC | Age: 73
End: 2023-07-18

## 2023-07-18 VITALS
WEIGHT: 195.7 LBS | HEART RATE: 74 BPM | DIASTOLIC BLOOD PRESSURE: 72 MMHG | SYSTOLIC BLOOD PRESSURE: 122 MMHG | BODY MASS INDEX: 24.33 KG/M2 | OXYGEN SATURATION: 98 % | HEIGHT: 75 IN

## 2023-07-18 DIAGNOSIS — Z23 NEED FOR PROPHYLACTIC VACCINATION AGAINST STREPTOCOCCUS PNEUMONIAE (PNEUMOCOCCUS): ICD-10-CM

## 2023-07-18 DIAGNOSIS — E78.2 MIXED HYPERLIPIDEMIA: ICD-10-CM

## 2023-07-18 DIAGNOSIS — I47.1 PSVT (PAROXYSMAL SUPRAVENTRICULAR TACHYCARDIA) (HCC): ICD-10-CM

## 2023-07-18 DIAGNOSIS — E11.00 TYPE 2 DIABETES MELLITUS WITH HYPEROSMOLARITY WITHOUT COMA, WITHOUT LONG-TERM CURRENT USE OF INSULIN (HCC): Primary | ICD-10-CM

## 2023-07-18 RX ORDER — METFORMIN HYDROCHLORIDE 500 MG/1
500 TABLET, EXTENDED RELEASE ORAL
Qty: 90 TABLET | Refills: 1
Start: 2023-07-18 | End: 2024-01-14

## 2023-07-18 NOTE — PROGRESS NOTES
7/21/2023    [facility]    Chief Complaint   Patient presents with    Diabetes     Pt would like to discuss dexacom or freestyle seun glucose meter       HPI  History was obtained from patient. Vadim Charles is a 71 y.o. male with a PMHx as listed below who presents today for follow up on chronic condtiions. No acute complaints    Seen in ED rapid heart beat plans to see Dr. Sari White, he has yet to make appointment     Does not check glucose much at home, DM2 has been well controlled    1. Type 2 diabetes mellitus with hyperosmolarity without coma, without long-term current use of insulin ([address])    2. Need for prophylactic vaccination against Streptococcus pneumoniae (pneumococcus)    3. PSVT (paroxysmal supraventricular tachycardia) ([address])    4. Mixed hyperlipidemia             REVIEW OF SYMPTOMS    Review of Systems   Constitutional:  Negative for chills and fatigue. HENT:  Negative for congestion and sore throat. Respiratory:  Negative for shortness of breath and wheezing. Cardiovascular:  Negative for chest pain and palpitations. Gastrointestinal:  Negative for abdominal pain and nausea. Genitourinary:  Negative for frequency and urgency. Neurological:  Negative for light-headedness. PAST MEDICAL HISTORY  Past Medical History:   Diagnosis Date    H/O echocardiogram 10/06/2020    EF55-60%, Mild TR & LVH.     History of exercise stress test 10/06/2020    Treadmill, Normal    Hyperlipidemia     Hypertension     PSVT (paroxysmal supraventricular tachycardia) ([address]) 9/24/2020 9/2/2020 ECG at ER at [facility]    Type 2 diabetes mellitus ([address])        FAMILY HISTORY  Family History   Problem Relation Age of Onset    Diabetes Mother     High Cholesterol Mother     Heart Disease Mother     Cancer Father     Diabetes Sister     High Blood Pressure Sister     High Cholesterol Sister     Other Sister         brain aneurysym    Heart Disease Sister     No Known Problems Sister     Cancer Sister

## 2023-07-19 ENCOUNTER — OFFICE VISIT (OUTPATIENT)
Dept: CARDIOLOGY CLINIC | Age: 73
End: 2023-07-19
Payer: MEDICARE

## 2023-07-19 VITALS
HEART RATE: 76 BPM | HEIGHT: 75 IN | DIASTOLIC BLOOD PRESSURE: 70 MMHG | WEIGHT: 194 LBS | SYSTOLIC BLOOD PRESSURE: 118 MMHG | BODY MASS INDEX: 24.12 KG/M2

## 2023-07-19 DIAGNOSIS — I10 PRIMARY HYPERTENSION: ICD-10-CM

## 2023-07-19 DIAGNOSIS — I47.1 PSVT (PAROXYSMAL SUPRAVENTRICULAR TACHYCARDIA) (HCC): Primary | ICD-10-CM

## 2023-07-19 DIAGNOSIS — E11.00 TYPE 2 DIABETES MELLITUS WITH HYPEROSMOLARITY WITHOUT COMA, WITHOUT LONG-TERM CURRENT USE OF INSULIN (HCC): ICD-10-CM

## 2023-07-19 PROCEDURE — 2022F DILAT RTA XM EVC RTNOPTHY: CPT | Performed by: INTERNAL MEDICINE

## 2023-07-19 PROCEDURE — 3078F DIAST BP <80 MM HG: CPT | Performed by: INTERNAL MEDICINE

## 2023-07-19 PROCEDURE — 1036F TOBACCO NON-USER: CPT | Performed by: INTERNAL MEDICINE

## 2023-07-19 PROCEDURE — 1123F ACP DISCUSS/DSCN MKR DOCD: CPT | Performed by: INTERNAL MEDICINE

## 2023-07-19 PROCEDURE — 3044F HG A1C LEVEL LT 7.0%: CPT | Performed by: INTERNAL MEDICINE

## 2023-07-19 PROCEDURE — 3074F SYST BP LT 130 MM HG: CPT | Performed by: INTERNAL MEDICINE

## 2023-07-19 PROCEDURE — 3017F COLORECTAL CA SCREEN DOC REV: CPT | Performed by: INTERNAL MEDICINE

## 2023-07-19 PROCEDURE — G8427 DOCREV CUR MEDS BY ELIG CLIN: HCPCS | Performed by: INTERNAL MEDICINE

## 2023-07-19 PROCEDURE — 99214 OFFICE O/P EST MOD 30 MIN: CPT | Performed by: INTERNAL MEDICINE

## 2023-07-19 PROCEDURE — G8420 CALC BMI NORM PARAMETERS: HCPCS | Performed by: INTERNAL MEDICINE

## 2023-07-19 RX ORDER — METOPROLOL SUCCINATE 50 MG/1
50 TABLET, EXTENDED RELEASE ORAL DAILY
Qty: 90 TABLET | Refills: 2 | Status: SHIPPED | OUTPATIENT
Start: 2023-07-19

## 2023-07-19 NOTE — ASSESSMENT & PLAN NOTE
Counseled to increase fluid intake as his creatinine was 1.4 and he follows up with PCP for sugar management.

## 2023-07-19 NOTE — PATIENT INSTRUCTIONS
Increase Toprol to 50 mg daily. Increase oral fluids. Start monitoring BP and HR daily and write it down and bring the readings along with the BP monitor for office visit. Consider EP guided therapy. Appropriate prescriptions if needed on this visit are addressed. After visit summery is provided. Questions answered and patient verbalizes understanding. Follow up in 6 months,  sooner if needed.

## 2023-07-19 NOTE — ASSESSMENT & PLAN NOTE
Start monitoring BP and HR daily and write it down and bring the readings along with the BP monitor for office visit. Well-controlled on lisinopril 10 mg and Toprol 25 mg daily. Since we are increasing Toprol to 50 mg daily he needs to monitor it closely and cut back on lisinopril as instructed.

## 2023-07-19 NOTE — PROGRESS NOTES
Elana Velez  1950  Aslo Priscila DO      Chief Complaint   Patient presents with    Palpitations    Hypertension    Tachycardia     Patient is seen for PSVT, hyperlipidemia, and hypertension. Patient states a couple of weeks ago he was mowing grass and started having palpitations. Patient states he went ER. Patient denies shortness of breath, dizziness, chest pain, and edema. Patient states he does not exercise regularly. Chief complaint and HPI:  Elana Velez  is a 71 y.o. male following up for SVT. Patient was in the emergency room on July 5 with the palpitations and was noted to be in SVT at a rate of 149 bpm.  He said he felt lightheaded and dizzy and short of breath however his blood pressure was fine in the emergency room. He did not have any chest pain. Patient did Valsalva at home but did not help his rapid heartbeat. His creatinine was high and he reports that he was working in the heat and did not drink enough fluids. Rest of the emergency room work-up was negative. This is the third episode of SVT in the last 2 years and he has been declining EP consultation and EP guided therapy. Rest of the Cardiovascular system review is otherwise unchanged from prior encounter. Past medical history:  has a past medical history of H/O echocardiogram, History of exercise stress test, Hyperlipidemia, Hypertension, PSVT (paroxysmal supraventricular tachycardia) ([address]), and Type 2 diabetes mellitus ([address] Past surgical history:  has a past surgical history that includes Colonoscopy (04/19/2021) and Colonoscopy (N/A, 4/19/2021).   Social History:   Social History     Tobacco Use    Smoking status: Never    Smokeless tobacco: Never   Substance Use Topics    Alcohol use: Not Currently     Comment: 2-3 beers per year/caffeine 3 cups of decaf a week     Family history: family history includes Alcohol Abuse in his brother; Cancer in his father and sister; Diabetes in his mother, sister, and sister;

## 2023-07-19 NOTE — ASSESSMENT & PLAN NOTE
Increase Toprol to 50 mg daily. Patient is encouraged to see EP for ablation and details of the procedure discussed and multiple questions answered and he says he will think about it. Counseled to start monitoring blood pressure closely and if it is running less than 110 he can cut back on lisinopril to half of it or even discontinue it if needed.

## 2023-07-21 DIAGNOSIS — E78.2 MIXED HYPERLIPIDEMIA: ICD-10-CM

## 2023-07-21 RX ORDER — ATORVASTATIN CALCIUM 20 MG/1
TABLET, FILM COATED ORAL
Qty: 90 TABLET | Refills: 1 | Status: SHIPPED | OUTPATIENT
Start: 2023-07-21

## 2023-07-21 ASSESSMENT — ENCOUNTER SYMPTOMS
SHORTNESS OF BREATH: 0
ABDOMINAL PAIN: 0
WHEEZING: 0
SORE THROAT: 0
NAUSEA: 0

## 2023-09-05 RX ORDER — METFORMIN HYDROCHLORIDE 500 MG/1
500 TABLET, EXTENDED RELEASE ORAL
Qty: 90 TABLET | Refills: 1 | Status: SHIPPED | OUTPATIENT
Start: 2023-09-05

## 2024-01-18 ENCOUNTER — OFFICE VISIT (OUTPATIENT)
Dept: FAMILY MEDICINE CLINIC | Age: 74
End: 2024-01-18
Payer: MEDICARE

## 2024-01-18 VITALS
DIASTOLIC BLOOD PRESSURE: 80 MMHG | WEIGHT: 201.1 LBS | BODY MASS INDEX: 25 KG/M2 | HEIGHT: 75 IN | HEART RATE: 67 BPM | SYSTOLIC BLOOD PRESSURE: 122 MMHG | OXYGEN SATURATION: 99 %

## 2024-01-18 DIAGNOSIS — E78.2 MIXED HYPERLIPIDEMIA: ICD-10-CM

## 2024-01-18 DIAGNOSIS — E11.00 TYPE 2 DIABETES MELLITUS WITH HYPEROSMOLARITY WITHOUT COMA, WITHOUT LONG-TERM CURRENT USE OF INSULIN (HCC): ICD-10-CM

## 2024-01-18 DIAGNOSIS — E11.00 TYPE 2 DIABETES MELLITUS WITH HYPEROSMOLARITY WITHOUT COMA, WITHOUT LONG-TERM CURRENT USE OF INSULIN (HCC): Primary | ICD-10-CM

## 2024-01-18 DIAGNOSIS — I10 PRIMARY HYPERTENSION: ICD-10-CM

## 2024-01-18 DIAGNOSIS — E11.9 TYPE 2 DIABETES MELLITUS WITHOUT COMPLICATION, WITHOUT LONG-TERM CURRENT USE OF INSULIN (HCC): ICD-10-CM

## 2024-01-18 LAB
ALBUMIN SERPL-MCNC: 4.4 G/DL (ref 3.4–5)
ALP SERPL-CCNC: 97 U/L (ref 40–129)
ALT SERPL-CCNC: 29 U/L (ref 10–40)
ANION GAP SERPL CALCULATED.3IONS-SCNC: 9 MMOL/L (ref 3–16)
AST SERPL-CCNC: 22 U/L (ref 15–37)
BILIRUB DIRECT SERPL-MCNC: <0.2 MG/DL (ref 0–0.3)
BILIRUB INDIRECT SERPL-MCNC: NORMAL MG/DL (ref 0–1)
BILIRUB SERPL-MCNC: 0.4 MG/DL (ref 0–1)
BUN SERPL-MCNC: 8 MG/DL (ref 7–20)
CALCIUM SERPL-MCNC: 9.6 MG/DL (ref 8.3–10.6)
CHLORIDE SERPL-SCNC: 104 MMOL/L (ref 99–110)
CHOLEST SERPL-MCNC: 131 MG/DL (ref 0–199)
CO2 SERPL-SCNC: 30 MMOL/L (ref 21–32)
CREAT SERPL-MCNC: 1.1 MG/DL (ref 0.8–1.3)
CREAT UR-MCNC: 253.6 MG/DL (ref 39–259)
GFR SERPLBLD CREATININE-BSD FMLA CKD-EPI: >60 ML/MIN/{1.73_M2}
GLUCOSE SERPL-MCNC: 137 MG/DL (ref 70–99)
HDLC SERPL-MCNC: 42 MG/DL (ref 40–60)
LDL CHOLESTEROL CALCULATED: 63 MG/DL
MICROALBUMIN UR DL<=1MG/L-MCNC: 2.3 MG/DL
MICROALBUMIN/CREAT UR: 9.1 MG/G (ref 0–30)
POTASSIUM SERPL-SCNC: 4.4 MMOL/L (ref 3.5–5.1)
PROT SERPL-MCNC: 7.1 G/DL (ref 6.4–8.2)
SODIUM SERPL-SCNC: 143 MMOL/L (ref 136–145)
TRIGL SERPL-MCNC: 128 MG/DL (ref 0–150)
VLDLC SERPL CALC-MCNC: 26 MG/DL

## 2024-01-18 PROCEDURE — 3017F COLORECTAL CA SCREEN DOC REV: CPT | Performed by: STUDENT IN AN ORGANIZED HEALTH CARE EDUCATION/TRAINING PROGRAM

## 2024-01-18 PROCEDURE — G8427 DOCREV CUR MEDS BY ELIG CLIN: HCPCS | Performed by: STUDENT IN AN ORGANIZED HEALTH CARE EDUCATION/TRAINING PROGRAM

## 2024-01-18 PROCEDURE — 1123F ACP DISCUSS/DSCN MKR DOCD: CPT | Performed by: STUDENT IN AN ORGANIZED HEALTH CARE EDUCATION/TRAINING PROGRAM

## 2024-01-18 PROCEDURE — G8417 CALC BMI ABV UP PARAM F/U: HCPCS | Performed by: STUDENT IN AN ORGANIZED HEALTH CARE EDUCATION/TRAINING PROGRAM

## 2024-01-18 PROCEDURE — G8484 FLU IMMUNIZE NO ADMIN: HCPCS | Performed by: STUDENT IN AN ORGANIZED HEALTH CARE EDUCATION/TRAINING PROGRAM

## 2024-01-18 PROCEDURE — 3051F HG A1C>EQUAL 7.0%<8.0%: CPT | Performed by: STUDENT IN AN ORGANIZED HEALTH CARE EDUCATION/TRAINING PROGRAM

## 2024-01-18 PROCEDURE — 3074F SYST BP LT 130 MM HG: CPT | Performed by: STUDENT IN AN ORGANIZED HEALTH CARE EDUCATION/TRAINING PROGRAM

## 2024-01-18 PROCEDURE — 2022F DILAT RTA XM EVC RTNOPTHY: CPT | Performed by: STUDENT IN AN ORGANIZED HEALTH CARE EDUCATION/TRAINING PROGRAM

## 2024-01-18 PROCEDURE — 1036F TOBACCO NON-USER: CPT | Performed by: STUDENT IN AN ORGANIZED HEALTH CARE EDUCATION/TRAINING PROGRAM

## 2024-01-18 PROCEDURE — 99214 OFFICE O/P EST MOD 30 MIN: CPT | Performed by: STUDENT IN AN ORGANIZED HEALTH CARE EDUCATION/TRAINING PROGRAM

## 2024-01-18 PROCEDURE — 3079F DIAST BP 80-89 MM HG: CPT | Performed by: STUDENT IN AN ORGANIZED HEALTH CARE EDUCATION/TRAINING PROGRAM

## 2024-01-18 RX ORDER — METFORMIN HYDROCHLORIDE 500 MG/1
500 TABLET, EXTENDED RELEASE ORAL
Qty: 90 TABLET | Refills: 1 | Status: SHIPPED | OUTPATIENT
Start: 2024-01-18

## 2024-01-18 RX ORDER — LISINOPRIL 10 MG/1
10 TABLET ORAL DAILY
Qty: 90 TABLET | Refills: 3 | Status: SHIPPED | OUTPATIENT
Start: 2024-01-18

## 2024-01-18 RX ORDER — ATORVASTATIN CALCIUM 20 MG/1
20 TABLET, FILM COATED ORAL DAILY
Qty: 90 TABLET | Refills: 1 | Status: SHIPPED | OUTPATIENT
Start: 2024-01-18

## 2024-01-18 NOTE — PROGRESS NOTES
current use of insulin (HCC)      Bp well controlled  DM2 fair control  Cholesterol well controlled 63    Hemoglobin A1C   Date Value Ref Range Status   01/18/2024 7.4 See comment % Final     Comment:     Comment:  Diagnosis of Diabetes: > or = 6.5%  Increased risk of diabetes (Prediabetes): 5.7-6.4%  Glycemic Control: Nonpregnant Adults: <7.0%                    Pregnant: <6.0%             Return in about 6 months (around 7/18/2024).         Electronically signed by Joce Rodríguez DO on 1/22/2024

## 2024-01-19 LAB
EST. AVERAGE GLUCOSE BLD GHB EST-MCNC: 165.7 MG/DL
HBA1C MFR BLD: 7.4 %

## 2024-01-22 ASSESSMENT — ENCOUNTER SYMPTOMS
SHORTNESS OF BREATH: 0
WHEEZING: 0
SORE THROAT: 0
ABDOMINAL PAIN: 0
NAUSEA: 0

## 2024-01-22 NOTE — RESULT ENCOUNTER NOTE
Siva's labs are normal except his diabetes got a little worse. Recommend we go up to 2 tablets metformin in AM from 1. Make a 3 month appointment. Let me know if ok with patient

## 2024-02-07 ENCOUNTER — OFFICE VISIT (OUTPATIENT)
Dept: CARDIOLOGY CLINIC | Age: 74
End: 2024-02-07
Payer: COMMERCIAL

## 2024-02-07 VITALS
HEIGHT: 75 IN | OXYGEN SATURATION: 98 % | HEART RATE: 79 BPM | WEIGHT: 206 LBS | DIASTOLIC BLOOD PRESSURE: 74 MMHG | BODY MASS INDEX: 25.61 KG/M2 | SYSTOLIC BLOOD PRESSURE: 124 MMHG

## 2024-02-07 DIAGNOSIS — E78.00 PURE HYPERCHOLESTEROLEMIA: ICD-10-CM

## 2024-02-07 DIAGNOSIS — E11.00 TYPE 2 DIABETES MELLITUS WITH HYPEROSMOLARITY WITHOUT COMA, WITHOUT LONG-TERM CURRENT USE OF INSULIN (HCC): ICD-10-CM

## 2024-02-07 DIAGNOSIS — I10 PRIMARY HYPERTENSION: ICD-10-CM

## 2024-02-07 DIAGNOSIS — I47.10 PSVT (PAROXYSMAL SUPRAVENTRICULAR TACHYCARDIA): Primary | ICD-10-CM

## 2024-02-07 PROCEDURE — 3074F SYST BP LT 130 MM HG: CPT | Performed by: INTERNAL MEDICINE

## 2024-02-07 PROCEDURE — G8427 DOCREV CUR MEDS BY ELIG CLIN: HCPCS | Performed by: INTERNAL MEDICINE

## 2024-02-07 PROCEDURE — 3078F DIAST BP <80 MM HG: CPT | Performed by: INTERNAL MEDICINE

## 2024-02-07 PROCEDURE — 3017F COLORECTAL CA SCREEN DOC REV: CPT | Performed by: INTERNAL MEDICINE

## 2024-02-07 PROCEDURE — 99214 OFFICE O/P EST MOD 30 MIN: CPT | Performed by: INTERNAL MEDICINE

## 2024-02-07 PROCEDURE — 1123F ACP DISCUSS/DSCN MKR DOCD: CPT | Performed by: INTERNAL MEDICINE

## 2024-02-07 PROCEDURE — 3051F HG A1C>EQUAL 7.0%<8.0%: CPT | Performed by: INTERNAL MEDICINE

## 2024-02-07 PROCEDURE — 2022F DILAT RTA XM EVC RTNOPTHY: CPT | Performed by: INTERNAL MEDICINE

## 2024-02-07 PROCEDURE — G8484 FLU IMMUNIZE NO ADMIN: HCPCS | Performed by: INTERNAL MEDICINE

## 2024-02-07 PROCEDURE — 1036F TOBACCO NON-USER: CPT | Performed by: INTERNAL MEDICINE

## 2024-02-07 PROCEDURE — G8417 CALC BMI ABV UP PARAM F/U: HCPCS | Performed by: INTERNAL MEDICINE

## 2024-02-07 NOTE — PROGRESS NOTES
known allergies.  Prior to Admission medications    Medication Sig Start Date End Date Taking? Authorizing Provider   atorvastatin (LIPITOR) 20 MG tablet Take 1 tablet by mouth daily 1/18/24  Yes Joce Rodríguez DO   lisinopril (PRINIVIL;ZESTRIL) 10 MG tablet Take 1 tablet by mouth daily take 1 tablet by mouth once daily 1/18/24  Yes Joce Rodríguez DO   metFORMIN (GLUCOPHAGE-XR) 500 MG extended release tablet Take 1 tablet by mouth daily (with breakfast) 1/18/24  Yes Joce Rodríguez DO   SITagliptin (JANUVIA) 100 MG tablet take 1 tablet by mouth once daily 7/21/23  Yes Joce Rodríguez DO   metoprolol succinate (TOPROL XL) 50 MG extended release tablet Take 1 tablet by mouth daily 7/19/23  Yes Sonal Gallego MD   aspirin 81 MG EC tablet Take 1 tablet by mouth daily 11/29/21  Yes Miguel Weeks MD   ibuprofen (ADVIL;MOTRIN) 800 MG tablet Take 1 tablet by mouth every 6 hours as needed for Pain 1/21/21  Yes Manolo Tripathi PA-C   blood glucose test strips (FREESTYLE LITE) strip 1 each by In Vitro route daily As needed. 1/4/21   Miguel Weeks MD   ONETOUCH VERIO strip TEST TWICE DAILY AND AS NEEDED 12/28/20   Miguel Weeks MD ONETOUCH DELICA LANCETS FINE MISC by Does not apply route    Efrain Hale MD   Glucose Blood (ONETOUCH VERIO VI) by In Vitro route    ProviderEfrain MD     Wt Readings from Last 3 Encounters:   02/07/24 93.4 kg (206 lb)   01/18/24 91.2 kg (201 lb 1.6 oz)   07/19/23 88 kg (194 lb)     Temp Readings from Last 3 Encounters:   07/05/23 98.3 °F (36.8 °C) (Oral)   06/01/23 99 °F (37.2 °C)   05/14/22 98.6 °F (37 °C) (Oral)     BP Readings from Last 3 Encounters:   02/07/24 124/74   01/18/24 122/80   07/19/23 118/70     Pulse Readings from Last 3 Encounters:   02/07/24 79   01/18/24 67   07/19/23 76     Body mass index is 25.75 kg/m².    Constitutional:  Patient is normally built and nourished male in no apparent distress.  Weight is up by 12 pounds since last

## 2024-02-07 NOTE — ASSESSMENT & PLAN NOTE
Start monitoring BP and HR daily and write it down and bring the readings along with the BP monitor for office visit.  Well-controlled on lisinopril 10 mg.

## 2024-02-07 NOTE — ASSESSMENT & PLAN NOTE
Continues to have episodes and now taking Toprol as needed rather than every day and he says it works best for him.  He knows that he has an option of having SVT ablation done and he said he will consider that option if he has frequent episodes.  Last time he was in emergency room for SVT was 2020.

## 2024-02-07 NOTE — PATIENT INSTRUCTIONS
Continue current cardiovascular medications which have been reviewed and discussed individually with you. Appropriate prescriptions if needed on this visit are addressed. After visit summery is provided.   Questions answered and patient verbalizes understanding. Follow up in 6 months with ECG,  sooner if needed.

## 2024-02-07 NOTE — ASSESSMENT & PLAN NOTE
Well-controlled on atorvastatin 20 mg daily.  Recent LDL was 63 on 1/18/2024 and triglycerides were 128 and total cholesterol was 131 and HDL was 42.  Continue the same.

## 2024-02-12 DIAGNOSIS — E11.00 TYPE 2 DIABETES MELLITUS WITH HYPEROSMOLARITY WITHOUT COMA, WITHOUT LONG-TERM CURRENT USE OF INSULIN (HCC): ICD-10-CM

## 2024-04-23 ENCOUNTER — OFFICE VISIT (OUTPATIENT)
Dept: FAMILY MEDICINE CLINIC | Age: 74
End: 2024-04-23
Payer: MEDICARE

## 2024-04-23 VITALS
HEIGHT: 75 IN | RESPIRATION RATE: 16 BRPM | BODY MASS INDEX: 25.71 KG/M2 | SYSTOLIC BLOOD PRESSURE: 148 MMHG | OXYGEN SATURATION: 100 % | WEIGHT: 206.8 LBS | DIASTOLIC BLOOD PRESSURE: 78 MMHG | HEART RATE: 83 BPM

## 2024-04-23 DIAGNOSIS — E11.00 TYPE 2 DIABETES MELLITUS WITH HYPEROSMOLARITY WITHOUT COMA, WITHOUT LONG-TERM CURRENT USE OF INSULIN (HCC): ICD-10-CM

## 2024-04-23 DIAGNOSIS — E78.2 MIXED HYPERLIPIDEMIA: ICD-10-CM

## 2024-04-23 DIAGNOSIS — E11.9 TYPE 2 DIABETES MELLITUS WITHOUT COMPLICATION, WITHOUT LONG-TERM CURRENT USE OF INSULIN (HCC): Primary | ICD-10-CM

## 2024-04-23 DIAGNOSIS — I10 PRIMARY HYPERTENSION: ICD-10-CM

## 2024-04-23 PROCEDURE — 1036F TOBACCO NON-USER: CPT | Performed by: STUDENT IN AN ORGANIZED HEALTH CARE EDUCATION/TRAINING PROGRAM

## 2024-04-23 PROCEDURE — G8417 CALC BMI ABV UP PARAM F/U: HCPCS | Performed by: STUDENT IN AN ORGANIZED HEALTH CARE EDUCATION/TRAINING PROGRAM

## 2024-04-23 PROCEDURE — 3077F SYST BP >= 140 MM HG: CPT | Performed by: STUDENT IN AN ORGANIZED HEALTH CARE EDUCATION/TRAINING PROGRAM

## 2024-04-23 PROCEDURE — 3078F DIAST BP <80 MM HG: CPT | Performed by: STUDENT IN AN ORGANIZED HEALTH CARE EDUCATION/TRAINING PROGRAM

## 2024-04-23 PROCEDURE — 3017F COLORECTAL CA SCREEN DOC REV: CPT | Performed by: STUDENT IN AN ORGANIZED HEALTH CARE EDUCATION/TRAINING PROGRAM

## 2024-04-23 PROCEDURE — 3051F HG A1C>EQUAL 7.0%<8.0%: CPT | Performed by: STUDENT IN AN ORGANIZED HEALTH CARE EDUCATION/TRAINING PROGRAM

## 2024-04-23 PROCEDURE — 1123F ACP DISCUSS/DSCN MKR DOCD: CPT | Performed by: STUDENT IN AN ORGANIZED HEALTH CARE EDUCATION/TRAINING PROGRAM

## 2024-04-23 PROCEDURE — G8427 DOCREV CUR MEDS BY ELIG CLIN: HCPCS | Performed by: STUDENT IN AN ORGANIZED HEALTH CARE EDUCATION/TRAINING PROGRAM

## 2024-04-23 PROCEDURE — 2022F DILAT RTA XM EVC RTNOPTHY: CPT | Performed by: STUDENT IN AN ORGANIZED HEALTH CARE EDUCATION/TRAINING PROGRAM

## 2024-04-23 PROCEDURE — 99214 OFFICE O/P EST MOD 30 MIN: CPT | Performed by: STUDENT IN AN ORGANIZED HEALTH CARE EDUCATION/TRAINING PROGRAM

## 2024-04-23 RX ORDER — BLOOD SUGAR DIAGNOSTIC
STRIP MISCELLANEOUS
Qty: 100 STRIP | Refills: 5 | Status: SHIPPED | OUTPATIENT
Start: 2024-04-23

## 2024-04-23 RX ORDER — METFORMIN HYDROCHLORIDE 500 MG/1
500 TABLET, EXTENDED RELEASE ORAL
Qty: 90 TABLET | Refills: 1
Start: 2024-04-23

## 2024-04-23 ASSESSMENT — PATIENT HEALTH QUESTIONNAIRE - PHQ9
SUM OF ALL RESPONSES TO PHQ QUESTIONS 1-9: 0
1. LITTLE INTEREST OR PLEASURE IN DOING THINGS: NOT AT ALL
SUM OF ALL RESPONSES TO PHQ QUESTIONS 1-9: 0
SUM OF ALL RESPONSES TO PHQ9 QUESTIONS 1 & 2: 0
SUM OF ALL RESPONSES TO PHQ QUESTIONS 1-9: 0
SUM OF ALL RESPONSES TO PHQ QUESTIONS 1-9: 0
2. FEELING DOWN, DEPRESSED OR HOPELESS: NOT AT ALL

## 2024-04-23 NOTE — PROGRESS NOTES
tablet; Take 1 tablet by mouth daily (with breakfast)  Dispense: 90 tablet; Refill: 1  - Comprehensive Metabolic Panel; Future  - Hemoglobin A1C; Future  - TSH with Reflex to FT4 ([facility] Only); Future    2. Type 2 diabetes mellitus without complication, without long-term current use of insulin (HCC)    - Comprehensive Metabolic Panel; Future  - Hemoglobin A1C; Future  - TSH with Reflex to FT4 ([place] Only); Future  - Microalbumin / Creatinine Urine Ratio; Future    3. Mixed hyperlipidemia    - Lipid, Fasting; Future    4. 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         Electronically signed by Joce Rodríguez DO on 5/2/2024

## 2024-05-02 ASSESSMENT — ENCOUNTER SYMPTOMS
ABDOMINAL PAIN: 0
WHEEZING: 0
SHORTNESS OF BREATH: 0
NAUSEA: 0
SORE THROAT: 0

## 2024-05-28 ENCOUNTER — HOSPITAL ENCOUNTER (EMERGENCY)
Age: 74
Discharge: HOME | End: 2024-05-28
Attending: EMERGENCY MEDICINE
Payer: MEDICARE

## 2024-05-28 VITALS
SYSTOLIC BLOOD PRESSURE: 139 MMHG | TEMPERATURE: 98.6 F | HEIGHT: 75 IN | WEIGHT: 209 LBS | RESPIRATION RATE: 18 BRPM | HEART RATE: 85 BPM | OXYGEN SATURATION: 93 % | DIASTOLIC BLOOD PRESSURE: 82 MMHG | BODY MASS INDEX: 25.99 KG/M2

## 2024-05-28 DIAGNOSIS — H10.9 CONJUNCTIVITIS OF RIGHT EYE, UNSPECIFIED CONJUNCTIVITIS TYPE: ICD-10-CM

## 2024-05-28 DIAGNOSIS — R05.9 COUGH, UNSPECIFIED TYPE: Primary | ICD-10-CM

## 2024-05-28 PROCEDURE — 99283 EMERGENCY DEPT VISIT LOW MDM: CPT

## 2024-05-28 RX ORDER — GUAIFENESIN/DEXTROMETHORPHAN 100-10MG/5
5 SYRUP ORAL 4 TIMES DAILY PRN
Qty: 120 ML | Refills: 0 | Status: SHIPPED | OUTPATIENT
Start: 2024-05-28 | End: 2024-06-07

## 2024-05-28 RX ORDER — POLYMYXIN B SULFATE AND TRIMETHOPRIM 1; 10000 MG/ML; [USP'U]/ML
1 SOLUTION OPHTHALMIC EVERY 4 HOURS
Qty: 3 ML | Refills: 0 | Status: SHIPPED | OUTPATIENT
Start: 2024-05-28 | End: 2024-06-07

## 2024-05-28 ASSESSMENT — PAIN SCALES - GENERAL: PAINLEVEL_OUTOF10: 3

## 2024-05-28 ASSESSMENT — PAIN - FUNCTIONAL ASSESSMENT: PAIN_FUNCTIONAL_ASSESSMENT: 0-10

## 2024-05-28 NOTE — ED PROVIDER NOTES
mL 0    SITagliptin (JANUVIA) 100 MG tablet take 1 tablet by mouth once daily 90 tablet 1    blood glucose test strips (ONETOUCH VERIO) strip TEST TWICE DAILY AND AS NEEDED 100 strip 5    metFORMIN (GLUCOPHAGE-XR) 500 MG extended release tablet Take 1 tablet by mouth daily (with breakfast) 90 tablet 1    Continuous Blood Gluc Sensor (FREESTYLE MATTHEW 2 SENSOR) MISC apply 1 SENSOR to back OF UPPER ARM REMOVE AND REPLACE every 14 days use with DEVICE to MONITOR BLOOD SUGAR 9 each 1    atorvastatin (LIPITOR) 20 MG tablet Take 1 tablet by mouth daily 90 tablet 1    lisinopril (PRINIVIL;ZESTRIL) 10 MG tablet Take 1 tablet by mouth daily take 1 tablet by mouth once daily 90 tablet 3    metoprolol succinate (TOPROL XL) 50 MG extended release tablet Take 1 tablet by mouth daily 90 tablet 2    aspirin 81 MG EC tablet Take 1 tablet by mouth daily 30 tablet 3    ibuprofen (ADVIL;MOTRIN) 800 MG tablet Take 1 tablet by mouth every 6 hours as needed for Pain 120 tablet 0    blood glucose test strips (FREESTYLE LITE) strip 1 each by In Vitro route daily As needed. 100 each 3    ONETOUCH DELICA LANCETS FINE MISC by Does not apply route      Glucose Blood (ONETOUCH VERIO VI) by In Vitro route       No Known Allergies    Nursing Notes Reviewed    Physical Exam:  Triage VS:    ED Triage Vitals [05/28/24 0537]   Enc Vitals Group      /69      Pulse 85      Respirations 18      Temp 98.6 °F (37 °C)      Temp Source Oral      SpO2 99 %      Weight - Scale 94.8 kg (209 lb)      Height 1.905 m (6' 3\")      Head Circumference       Peak Flow       Pain Score       Pain Loc       Pain Edu?       Excl. in GC?        My pulse ox interpretation is - normal    General appearance:  No acute distress.   Skin:  Warm. Dry.   Eye:  Extraocular movements intact.  No periorbital swelling bilaterally noted.  Right conjunctival injection noted, with some mild clear tearing, no purulent drainage noted.  He has no chemosis.  He has no hyphema or

## 2024-07-31 ENCOUNTER — OFFICE VISIT (OUTPATIENT)
Dept: CARDIOLOGY CLINIC | Age: 74
End: 2024-07-31
Payer: MEDICARE

## 2024-07-31 VITALS
HEIGHT: 75 IN | WEIGHT: 200 LBS | SYSTOLIC BLOOD PRESSURE: 130 MMHG | HEART RATE: 74 BPM | BODY MASS INDEX: 24.87 KG/M2 | DIASTOLIC BLOOD PRESSURE: 68 MMHG | OXYGEN SATURATION: 97 %

## 2024-07-31 DIAGNOSIS — R06.02 SHORTNESS OF BREATH: ICD-10-CM

## 2024-07-31 DIAGNOSIS — I10 PRIMARY HYPERTENSION: ICD-10-CM

## 2024-07-31 DIAGNOSIS — R06.09 DOE (DYSPNEA ON EXERTION): Primary | ICD-10-CM

## 2024-07-31 DIAGNOSIS — I25.10 ASCVD (ARTERIOSCLEROTIC CARDIOVASCULAR DISEASE): ICD-10-CM

## 2024-07-31 DIAGNOSIS — E11.00 TYPE 2 DIABETES MELLITUS WITH HYPEROSMOLARITY WITHOUT COMA, WITHOUT LONG-TERM CURRENT USE OF INSULIN (HCC): ICD-10-CM

## 2024-07-31 DIAGNOSIS — E78.00 PURE HYPERCHOLESTEROLEMIA: ICD-10-CM

## 2024-07-31 DIAGNOSIS — I47.10 PSVT (PAROXYSMAL SUPRAVENTRICULAR TACHYCARDIA) (HCC): ICD-10-CM

## 2024-07-31 PROCEDURE — 3078F DIAST BP <80 MM HG: CPT | Performed by: INTERNAL MEDICINE

## 2024-07-31 PROCEDURE — G8417 CALC BMI ABV UP PARAM F/U: HCPCS | Performed by: INTERNAL MEDICINE

## 2024-07-31 PROCEDURE — 93000 ELECTROCARDIOGRAM COMPLETE: CPT | Performed by: INTERNAL MEDICINE

## 2024-07-31 PROCEDURE — G8427 DOCREV CUR MEDS BY ELIG CLIN: HCPCS | Performed by: INTERNAL MEDICINE

## 2024-07-31 PROCEDURE — 2022F DILAT RTA XM EVC RTNOPTHY: CPT | Performed by: INTERNAL MEDICINE

## 2024-07-31 PROCEDURE — 3051F HG A1C>EQUAL 7.0%<8.0%: CPT | Performed by: INTERNAL MEDICINE

## 2024-07-31 PROCEDURE — 3075F SYST BP GE 130 - 139MM HG: CPT | Performed by: INTERNAL MEDICINE

## 2024-07-31 PROCEDURE — 99214 OFFICE O/P EST MOD 30 MIN: CPT | Performed by: INTERNAL MEDICINE

## 2024-07-31 PROCEDURE — 3017F COLORECTAL CA SCREEN DOC REV: CPT | Performed by: INTERNAL MEDICINE

## 2024-07-31 PROCEDURE — 1123F ACP DISCUSS/DSCN MKR DOCD: CPT | Performed by: INTERNAL MEDICINE

## 2024-07-31 PROCEDURE — 1036F TOBACCO NON-USER: CPT | Performed by: INTERNAL MEDICINE

## 2024-07-31 RX ORDER — METOPROLOL SUCCINATE 50 MG/1
50 TABLET, EXTENDED RELEASE ORAL DAILY
Qty: 90 TABLET | Refills: 2 | Status: SHIPPED | OUTPATIENT
Start: 2024-07-31

## 2024-07-31 NOTE — ASSESSMENT & PLAN NOTE
His recent hemoglobin A1c was 7.4 and his metformin has been doubled and he has a follow-up with PCP.

## 2024-07-31 NOTE — ASSESSMENT & PLAN NOTE
Repeat stress Cardiolite to evaluate any significant ischemia component to his dyspnea on exertion as his ASCVD risk is 34% despite fairly well-controlled risk factors.

## 2024-07-31 NOTE — PROGRESS NOTES
Siva Jason  1950  Joce Rodríguez DO      Chief Complaint   Patient presents with    Follow-up     Pt states slight SOB with exertion, pt states no other cardiac sx      Chief complaint and HPI:  Siva Jason  is a 73 y.o. male following up for history of paroxysmal supraventricular tachyarrhythmias and has hypertension hyperlipidemia and high ASCVD 10-year risk of cardiovascular events of 37.4%.  He is reporting dyspnea on exertion which is new.  Denies any palpitations has not had any recurrence of SVT.  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  He does not do any exercises.  He does not smoke.    Rest of the Cardiovascular system review is otherwise unchanged from prior encounter.  Past medical history:  has a past medical history of H/O echocardiogram, History of exercise stress test, Hyperlipidemia, Hypertension, PSVT (paroxysmal supraventricular tachycardia) (HCC), and Type 2 diabetes mellitus (HCC).  Past surgical history:  has a past surgical history that includes Colonoscopy (04/19/2021) and Colonoscopy (N/A, 4/19/2021).  Social History:   Social History     Tobacco Use    Smoking status: Never    Smokeless tobacco: Never   Substance Use Topics    Alcohol use: Not Currently     Comment: 2-3 beers per year/caffeine 3 cups of decaf a week     Family history: family history includes Alcohol Abuse in his brother; Cancer in his father and sister; Diabetes in his mother, sister, and sister; Heart Disease in his brother, mother, and sister; High Blood Pressure in his sister; High Cholesterol in his mother and sister; No Known Problems in his brother and sister; Other in his sister.  ALLERGIES:  Patient has no known allergies.    Prior to Admission medications    Medication Sig Start Date End Date Taking? Authorizing Provider   metoprolol succinate (TOPROL XL) 50 MG extended release tablet

## 2024-07-31 NOTE — ASSESSMENT & PLAN NOTE
Fairly well-controlled on current combination of medications including lisinopril and Toprol.  Continue both.

## 2024-07-31 NOTE — PATIENT INSTRUCTIONS
Stress Cardiolite on current medications. Repeat labs. Continue current cardiovascular medications which have been reviewed and discussed individually with you.  Appropriate prescriptions if needed on this visit are addressed. After visit summery is provided.   Questions answered and patient verbalizes understanding. Follow up in 6 months,  sooner if needed.

## 2024-08-13 ENCOUNTER — TELEPHONE (OUTPATIENT)
Dept: CARDIOLOGY CLINIC | Age: 74
End: 2024-08-13

## 2024-08-13 NOTE — TELEPHONE ENCOUNTER
Called pt in regards to testing results. Pt voiced understanding of current plan and next ov at this time.       Stress Test: A Jose protocol stress test was performed. The patient exercised for 6 min on standard Jose protocol reaching a maximum workload of 7 METS. Hemodynamics are adequate for diagnosis. Blood pressure demonstrated a normal response and heart rate demonstrated a normal response to stress. The patient's heart rate recovery was normal.    Perfusion Comments: LV perfusion is normal. There is no evidence of inducible ischemia.    Rest Function: Resting ejection fraction was 65%. EDV was 82 ml ESV was 29 ml    Image quality is good.

## 2024-09-12 DIAGNOSIS — E11.00 TYPE 2 DIABETES MELLITUS WITH HYPEROSMOLARITY WITHOUT COMA, WITHOUT LONG-TERM CURRENT USE OF INSULIN (HCC): ICD-10-CM

## 2024-09-12 RX ORDER — METFORMIN HCL 500 MG
500 TABLET, EXTENDED RELEASE 24 HR ORAL
Qty: 90 TABLET | Refills: 1 | Status: SHIPPED | OUTPATIENT
Start: 2024-09-12

## 2024-09-25 DIAGNOSIS — I10 PRIMARY HYPERTENSION: ICD-10-CM

## 2024-09-25 DIAGNOSIS — E11.00 TYPE 2 DIABETES MELLITUS WITH HYPEROSMOLARITY WITHOUT COMA, WITHOUT LONG-TERM CURRENT USE OF INSULIN (HCC): ICD-10-CM

## 2024-09-25 DIAGNOSIS — E78.2 MIXED HYPERLIPIDEMIA: ICD-10-CM

## 2024-09-25 RX ORDER — LISINOPRIL 10 MG/1
10 TABLET ORAL DAILY
Qty: 90 TABLET | Refills: 3 | Status: SHIPPED | OUTPATIENT
Start: 2024-09-25

## 2024-09-25 RX ORDER — ATORVASTATIN CALCIUM 20 MG/1
20 TABLET, FILM COATED ORAL DAILY
Qty: 90 TABLET | Refills: 1 | Status: SHIPPED | OUTPATIENT
Start: 2024-09-25

## 2024-10-24 ENCOUNTER — OFFICE VISIT (OUTPATIENT)
Dept: FAMILY MEDICINE CLINIC | Age: 74
End: 2024-10-24

## 2024-10-24 ENCOUNTER — TELEMEDICINE (OUTPATIENT)
Dept: FAMILY MEDICINE CLINIC | Age: 74
End: 2024-10-24

## 2024-10-24 VITALS
WEIGHT: 204 LBS | RESPIRATION RATE: 16 BRPM | OXYGEN SATURATION: 100 % | DIASTOLIC BLOOD PRESSURE: 78 MMHG | BODY MASS INDEX: 25.36 KG/M2 | SYSTOLIC BLOOD PRESSURE: 140 MMHG | HEART RATE: 48 BPM | HEIGHT: 75 IN

## 2024-10-24 DIAGNOSIS — E11.00 TYPE 2 DIABETES MELLITUS WITH HYPEROSMOLARITY WITHOUT COMA, WITHOUT LONG-TERM CURRENT USE OF INSULIN (HCC): Primary | ICD-10-CM

## 2024-10-24 DIAGNOSIS — I25.10 ASCVD (ARTERIOSCLEROTIC CARDIOVASCULAR DISEASE): ICD-10-CM

## 2024-10-24 DIAGNOSIS — Z00.00 MEDICARE ANNUAL WELLNESS VISIT, SUBSEQUENT: Primary | ICD-10-CM

## 2024-10-24 DIAGNOSIS — E11.00 TYPE 2 DIABETES MELLITUS WITH HYPEROSMOLARITY WITHOUT COMA, WITHOUT LONG-TERM CURRENT USE OF INSULIN (HCC): ICD-10-CM

## 2024-10-24 DIAGNOSIS — E11.9 TYPE 2 DIABETES MELLITUS WITHOUT COMPLICATION, WITHOUT LONG-TERM CURRENT USE OF INSULIN (HCC): ICD-10-CM

## 2024-10-24 DIAGNOSIS — E78.2 MIXED HYPERLIPIDEMIA: ICD-10-CM

## 2024-10-24 DIAGNOSIS — I10 PRIMARY HYPERTENSION: ICD-10-CM

## 2024-10-24 LAB
ALBUMIN SERPL-MCNC: 4.5 G/DL (ref 3.4–5)
ALBUMIN/GLOB SERPL: 1.7 {RATIO} (ref 1.1–2.2)
ALP SERPL-CCNC: 80 U/L (ref 40–129)
ALT SERPL-CCNC: 34 U/L (ref 10–40)
ANION GAP SERPL CALCULATED.3IONS-SCNC: 11 MMOL/L (ref 3–16)
AST SERPL-CCNC: 24 U/L (ref 15–37)
BILIRUB SERPL-MCNC: 0.5 MG/DL (ref 0–1)
BUN SERPL-MCNC: 19 MG/DL (ref 7–20)
CALCIUM SERPL-MCNC: 9.9 MG/DL (ref 8.3–10.6)
CHLORIDE SERPL-SCNC: 100 MMOL/L (ref 99–110)
CHOLEST SERPL-MCNC: 148 MG/DL (ref 0–199)
CO2 SERPL-SCNC: 25 MMOL/L (ref 21–32)
CREAT SERPL-MCNC: 1.2 MG/DL (ref 0.8–1.3)
EST. AVERAGE GLUCOSE BLD GHB EST-MCNC: 185.8 MG/DL
GFR SERPLBLD CREATININE-BSD FMLA CKD-EPI: 64 ML/MIN/{1.73_M2}
GLUCOSE SERPL-MCNC: 137 MG/DL (ref 70–99)
HBA1C MFR BLD: 8.1 %
HDLC SERPL-MCNC: 40 MG/DL (ref 40–60)
LDLC SERPL CALC-MCNC: 88 MG/DL
POTASSIUM SERPL-SCNC: 4.9 MMOL/L (ref 3.5–5.1)
PROT SERPL-MCNC: 7.2 G/DL (ref 6.4–8.2)
SODIUM SERPL-SCNC: 136 MMOL/L (ref 136–145)
TRIGL SERPL-MCNC: 99 MG/DL (ref 0–150)
TSH SERPL DL<=0.005 MIU/L-ACNC: 1.94 UIU/ML (ref 0.27–4.2)
VLDLC SERPL CALC-MCNC: 20 MG/DL

## 2024-10-24 SDOH — ECONOMIC STABILITY: FOOD INSECURITY: WITHIN THE PAST 12 MONTHS, THE FOOD YOU BOUGHT JUST DIDN'T LAST AND YOU DIDN'T HAVE MONEY TO GET MORE.: NEVER TRUE

## 2024-10-24 SDOH — ECONOMIC STABILITY: FOOD INSECURITY: WITHIN THE PAST 12 MONTHS, YOU WORRIED THAT YOUR FOOD WOULD RUN OUT BEFORE YOU GOT MONEY TO BUY MORE.: NEVER TRUE

## 2024-10-24 SDOH — ECONOMIC STABILITY: INCOME INSECURITY: HOW HARD IS IT FOR YOU TO PAY FOR THE VERY BASICS LIKE FOOD, HOUSING, MEDICAL CARE, AND HEATING?: NOT HARD AT ALL

## 2024-10-24 ASSESSMENT — PATIENT HEALTH QUESTIONNAIRE - PHQ9
SUM OF ALL RESPONSES TO PHQ9 QUESTIONS 1 & 2: 0
SUM OF ALL RESPONSES TO PHQ QUESTIONS 1-9: 0
2. FEELING DOWN, DEPRESSED OR HOPELESS: NOT AT ALL
1. LITTLE INTEREST OR PLEASURE IN DOING THINGS: NOT AT ALL

## 2024-10-24 NOTE — PROGRESS NOTES
11/19/2024    Siva Jason    Chief Complaint   Patient presents with    6 Month Follow-Up     6 month. No complaints.     Health Maintenance     Declines diabetic foot exam.   Would like a call from [employer] for AWV.        HPI  History was obtained from patient.  Siva is a 74 y.o. male with a PMHx as listed below who presents today for 6 monrh follow up. No acute complaints. Doing well on current medications.       1. Type 2 diabetes mellitus with hyperosmolarity without coma, without long-term current use of insulin (HCC)    2. Mixed hyperlipidemia    3. Primary hypertension    4. ASCVD (arteriosclerotic cardiovascular disease)             REVIEW OF SYMPTOMS    Review of Systems   Constitutional:  Negative for chills and fatigue.   HENT:  Negative for congestion and sore throat.    Respiratory:  Negative for shortness of breath and wheezing.    Cardiovascular:  Negative for chest pain and palpitations.   Gastrointestinal:  Negative for abdominal pain and nausea.   Genitourinary:  Negative for frequency and urgency.   Neurological:  Negative for light-headedness.       PAST MEDICAL HISTORY  Past Medical History:   Diagnosis Date    H/O echocardiogram 10/06/2020    EF55-60%, Mild TR & LVH.    History of exercise stress test 10/06/2020    Treadmill, Normal    Hyperlipidemia     Hypertension     PSVT (paroxysmal supraventricular tachycardia) (HCC) 9/24/2020 9/2/2020 ECG [facility] ER [facility]    Type 2 diabetes mellitus (HCC)        FAMILY HISTORY  Family History   Problem Relation Age of Onset    Diabetes Mother     High Cholesterol Mother     Heart Disease Mother     Cancer Father     Diabetes Sister     High Blood Pressure Sister     High Cholesterol Sister     Other Sister         brain aneurysym    Heart Disease Sister     No Known Problems Sister     Other Sister         in hospital w/ pneumonia as of 10/24/24    Cancer Sister         unknown-chemotherapy    Diabetes Sister     Alcohol Abuse Brother     Heart Disease

## 2024-10-24 NOTE — PATIENT INSTRUCTIONS
Personalized Preventive Plan for Siva Jason - 10/24/2024  Medicare offers a range of preventive health benefits. Some of the tests and screenings are paid in full while other may be subject to a deductible, co-insurance, and/or copay.    Some of these benefits include a comprehensive review of your medical history including lifestyle, illnesses that may run in your family, and various assessments and screenings as appropriate.    After reviewing your medical record and screening and assessments performed today your provider may have ordered immunizations, labs, imaging, and/or referrals for you.  A list of these orders (if applicable) as well as your Preventive Care list are included within your After Visit Summary for your review.    Other Preventive Recommendations:    A preventive eye exam performed by an eye specialist is recommended every 1-2 years to screen for glaucoma; cataracts, macular degeneration, and other eye disorders.  A preventive dental visit is recommended every 6 months.  Try to get at least 150 minutes of exercise per week or 10,000 steps per day on a pedometer .  Order or download the FREE \"Exercise & Physical Activity: Your Everyday Guide\" from [employer] [place] [employer]. Call 1-677.163.2373 or search [employer] [place] [employer] online.  You need 4362-2142 mg of calcium and 1667-3465 IU of vitamin D per day. It is possible to meet your calcium requirement with diet alone, but a vitamin D supplement is usually necessary to meet this goal.  When exposed to the sun, use a sunscreen that protects against both UVA and UVB radiation with an SPF of 30 or greater. Reapply every 2 to 3 hours or after sweating, drying off with a towel, or swimming.  Always wear a seat belt when traveling in a car. Always wear a helmet when riding a bicycle or motorcycle.

## 2024-10-24 NOTE — PROGRESS NOTES
Medicare Annual Wellness Visit    Siva Jason is here for Medicare AWV    Assessment & Plan   Medicare annual wellness visit, subsequent  Recommendations for Preventive Services Due: see orders and patient instructions/AVS.  Recommended screening schedule for the next 5-10 years is provided to the patient in written form: see Patient Instructions/AVS.     No follow-ups on file.     Subjective       Patient's complete Health Risk Assessment and screening values have been reviewed and are found in Flowsheets. The following problems were reviewed today and where indicated follow up appointments were made and/or referrals ordered.    Positive Risk Factor Screenings with Interventions:               General HRA Questions:  Select all that apply: (!) Loneliness (lives alone, ok w/ it-doesn't want to be around drama)  Interventions - Loneliness:  Patient comments: patient states he does live alone. He states he is okay with it most of the time, because he doesn't want to be around any drama.  Patient declined any further interventions or treatment                            Objective    Patient-Reported Vitals  Patient-Reported Systolic (Top): 140 mmHg  Patient-Reported Diastolic (Bottom): 78 mmHg  Patient-Reported Pulse: 48  Patient-Reported Weight: 204 lb  Patient-Reported Height: 6' 3\"  Patient-Reported Pulse Oximetry: 100               No Known Allergies  Prior to Visit Medications    Medication Sig Taking? Authorizing Provider   atorvastatin (LIPITOR) 20 MG tablet Take 1 tablet by mouth daily Yes Joce Rodríguez DO   lisinopril (PRINIVIL;ZESTRIL) 10 MG tablet Take 1 tablet by mouth daily take 1 tablet by mouth once daily Yes Joce Rodríguez DO   metFORMIN (GLUCOPHAGE-XR) 500 MG extended release tablet Take 1 tablet by mouth daily (with breakfast) Yes Joce Rodríguez DO   metoprolol succinate (TOPROL XL) 50 MG extended release tablet Take 1 tablet by mouth daily Yes Sonal Gallego MD   SITagliptin (JANUVIA) 100

## 2024-10-29 DIAGNOSIS — E11.00 TYPE 2 DIABETES MELLITUS WITH HYPEROSMOLARITY WITHOUT COMA, WITHOUT LONG-TERM CURRENT USE OF INSULIN (HCC): ICD-10-CM

## 2024-10-29 RX ORDER — METFORMIN HYDROCHLORIDE 500 MG/1
1500 TABLET, EXTENDED RELEASE ORAL
Qty: 270 TABLET | Refills: 1 | Status: SHIPPED | OUTPATIENT
Start: 2024-10-29

## 2024-10-29 NOTE — RESULT ENCOUNTER NOTE
Diabetes worsened switch to 3 month appointment. Recommend increasing to 3 tablets metformin daily.

## 2024-11-19 ASSESSMENT — ENCOUNTER SYMPTOMS
NAUSEA: 0
SORE THROAT: 0
ABDOMINAL PAIN: 0
WHEEZING: 0
SHORTNESS OF BREATH: 0

## 2025-01-30 ENCOUNTER — OFFICE VISIT (OUTPATIENT)
Dept: CARDIOLOGY CLINIC | Age: 75
End: 2025-01-30
Payer: MEDICARE

## 2025-01-30 ENCOUNTER — OFFICE VISIT (OUTPATIENT)
Dept: FAMILY MEDICINE CLINIC | Age: 75
End: 2025-01-30

## 2025-01-30 VITALS
WEIGHT: 204 LBS | SYSTOLIC BLOOD PRESSURE: 130 MMHG | DIASTOLIC BLOOD PRESSURE: 68 MMHG | BODY MASS INDEX: 25.36 KG/M2 | OXYGEN SATURATION: 98 % | HEART RATE: 73 BPM | HEIGHT: 75 IN

## 2025-01-30 VITALS
DIASTOLIC BLOOD PRESSURE: 76 MMHG | HEIGHT: 75 IN | OXYGEN SATURATION: 100 % | SYSTOLIC BLOOD PRESSURE: 142 MMHG | RESPIRATION RATE: 18 BRPM | BODY MASS INDEX: 25.19 KG/M2 | WEIGHT: 202.6 LBS | HEART RATE: 64 BPM

## 2025-01-30 DIAGNOSIS — E11.00 TYPE 2 DIABETES MELLITUS WITH HYPEROSMOLARITY WITHOUT COMA, WITHOUT LONG-TERM CURRENT USE OF INSULIN (HCC): Primary | ICD-10-CM

## 2025-01-30 DIAGNOSIS — E78.2 MIXED HYPERLIPIDEMIA: ICD-10-CM

## 2025-01-30 DIAGNOSIS — I10 PRIMARY HYPERTENSION: ICD-10-CM

## 2025-01-30 DIAGNOSIS — I25.10 ASCVD (ARTERIOSCLEROTIC CARDIOVASCULAR DISEASE): ICD-10-CM

## 2025-01-30 DIAGNOSIS — I47.10 PSVT (PAROXYSMAL SUPRAVENTRICULAR TACHYCARDIA) (HCC): Primary | ICD-10-CM

## 2025-01-30 DIAGNOSIS — E11.00 TYPE 2 DIABETES MELLITUS WITH HYPEROSMOLARITY WITHOUT COMA, WITHOUT LONG-TERM CURRENT USE OF INSULIN (HCC): ICD-10-CM

## 2025-01-30 DIAGNOSIS — E78.00 PURE HYPERCHOLESTEROLEMIA: ICD-10-CM

## 2025-01-30 DIAGNOSIS — E11.9 TYPE 2 DIABETES MELLITUS WITHOUT COMPLICATION, WITHOUT LONG-TERM CURRENT USE OF INSULIN (HCC): ICD-10-CM

## 2025-01-30 LAB
ANION GAP SERPL CALCULATED.3IONS-SCNC: 11 MMOL/L (ref 3–16)
BUN SERPL-MCNC: 11 MG/DL (ref 7–20)
CALCIUM SERPL-MCNC: 9.8 MG/DL (ref 8.3–10.6)
CHLORIDE SERPL-SCNC: 104 MMOL/L (ref 99–110)
CO2 SERPL-SCNC: 28 MMOL/L (ref 21–32)
CREAT SERPL-MCNC: 1.1 MG/DL (ref 0.8–1.3)
CREAT UR-MCNC: 115 MG/DL (ref 39–259)
GFR SERPLBLD CREATININE-BSD FMLA CKD-EPI: 71 ML/MIN/{1.73_M2}
GLUCOSE SERPL-MCNC: 142 MG/DL (ref 70–99)
MICROALBUMIN UR DL<=1MG/L-MCNC: <1.2 MG/DL
MICROALBUMIN/CREAT UR: NORMAL MG/G (ref 0–30)
POTASSIUM SERPL-SCNC: 4.6 MMOL/L (ref 3.5–5.1)
SODIUM SERPL-SCNC: 143 MMOL/L (ref 136–145)

## 2025-01-30 PROCEDURE — 3017F COLORECTAL CA SCREEN DOC REV: CPT | Performed by: INTERNAL MEDICINE

## 2025-01-30 PROCEDURE — 1036F TOBACCO NON-USER: CPT | Performed by: INTERNAL MEDICINE

## 2025-01-30 PROCEDURE — 1123F ACP DISCUSS/DSCN MKR DOCD: CPT | Performed by: INTERNAL MEDICINE

## 2025-01-30 PROCEDURE — 1159F MED LIST DOCD IN RCRD: CPT | Performed by: INTERNAL MEDICINE

## 2025-01-30 PROCEDURE — 3078F DIAST BP <80 MM HG: CPT | Performed by: INTERNAL MEDICINE

## 2025-01-30 PROCEDURE — 2022F DILAT RTA XM EVC RTNOPTHY: CPT | Performed by: INTERNAL MEDICINE

## 2025-01-30 PROCEDURE — G8417 CALC BMI ABV UP PARAM F/U: HCPCS | Performed by: INTERNAL MEDICINE

## 2025-01-30 PROCEDURE — 3046F HEMOGLOBIN A1C LEVEL >9.0%: CPT | Performed by: INTERNAL MEDICINE

## 2025-01-30 PROCEDURE — G8427 DOCREV CUR MEDS BY ELIG CLIN: HCPCS | Performed by: INTERNAL MEDICINE

## 2025-01-30 PROCEDURE — 3075F SYST BP GE 130 - 139MM HG: CPT | Performed by: INTERNAL MEDICINE

## 2025-01-30 PROCEDURE — 99214 OFFICE O/P EST MOD 30 MIN: CPT | Performed by: INTERNAL MEDICINE

## 2025-01-30 RX ORDER — ATORVASTATIN CALCIUM 20 MG/1
20 TABLET, FILM COATED ORAL DAILY
Qty: 90 TABLET | Refills: 1 | Status: SHIPPED | OUTPATIENT
Start: 2025-01-30

## 2025-01-30 SDOH — ECONOMIC STABILITY: FOOD INSECURITY: WITHIN THE PAST 12 MONTHS, YOU WORRIED THAT YOUR FOOD WOULD RUN OUT BEFORE YOU GOT MONEY TO BUY MORE.: NEVER TRUE

## 2025-01-30 SDOH — ECONOMIC STABILITY: FOOD INSECURITY: WITHIN THE PAST 12 MONTHS, THE FOOD YOU BOUGHT JUST DIDN'T LAST AND YOU DIDN'T HAVE MONEY TO GET MORE.: NEVER TRUE

## 2025-01-30 ASSESSMENT — PATIENT HEALTH QUESTIONNAIRE - PHQ9
SUM OF ALL RESPONSES TO PHQ QUESTIONS 1-9: 0
SUM OF ALL RESPONSES TO PHQ QUESTIONS 1-9: 0
2. FEELING DOWN, DEPRESSED OR HOPELESS: NOT AT ALL
SUM OF ALL RESPONSES TO PHQ9 QUESTIONS 1 & 2: 0
1. LITTLE INTEREST OR PLEASURE IN DOING THINGS: NOT AT ALL
SUM OF ALL RESPONSES TO PHQ QUESTIONS 1-9: 0
SUM OF ALL RESPONSES TO PHQ QUESTIONS 1-9: 0

## 2025-01-30 NOTE — ASSESSMENT & PLAN NOTE
Fairly well-controlled on current medications.  His blood pressure was borderline high at PCPs office.  I have asked him to start taking Toprol daily rather than as needed.

## 2025-01-30 NOTE — PATIENT INSTRUCTIONS
Continue current cardiovascular medications which have been reviewed and discussed individually with you.  Continue to exercise.  Appropriate prescriptions if needed on this visit are addressed. After visit summery is provided.   Questions answered and patient verbalizes understanding. Follow up in 6 months with ECG,  sooner if needed.

## 2025-01-30 NOTE — ASSESSMENT & PLAN NOTE
Recent hemoglobin A1c is higher than before it was 8.1 and he is taking more metformin now and has a follow-up with PCP.

## 2025-01-30 NOTE — ASSESSMENT & PLAN NOTE
Last LDL was 88 on 10/24/2024 on current dose of atorvastatin 20 mg daily.  It was in 60s prior to this.  Counseled to be compliant to medications and repeat lipids prior to next office visit if he continues to be more than 70 we will increase atorvastatin dosage.

## 2025-01-30 NOTE — PROGRESS NOTES
Siva Jason  1950  Joce Rodríguez DO      Chief Complaint   Patient presents with    6 Month Follow-Up     Pt states no cardiac sx      Chief complaint and HPI:  Siva Jason  is a 74 y.o. male following up for history of SVT and has diabetes hypertension hyperlipidemia and high 10-year ASCVD risk for cardiovascular events.  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  He does not exercise other than that.  He is compliant to medication however he says he takes Toprol on a as needed basis if he feels his heart is going fast which she had not had any in the last 6 months.  PCP encounters and vital signs were reviewed.  Medications reviewed and reconciled.    Rest of the Cardiovascular system review is otherwise unchanged from prior encounter.  Past medical history:  has a past medical history of H/O echocardiogram, History of exercise stress test, Hyperlipidemia, Hypertension, PSVT (paroxysmal supraventricular tachycardia) (HCC), and Type 2 diabetes mellitus (HCC).  Past surgical history:  has a past surgical history that includes Colonoscopy (04/19/2021) and Colonoscopy (N/A, 4/19/2021).  Social History:   Social History     Tobacco Use    Smoking status: Never    Smokeless tobacco: Never   Substance Use Topics    Alcohol use: Not Currently     Comment: 2-3 beers per year     Family history: family history includes Alcohol Abuse in his brother; Cancer in his father and sister; Diabetes in his mother, sister, and sister; Heart Disease in his brother, mother, and sister; High Blood Pressure in his sister; High Cholesterol in his mother and sister; No Known Problems in his brother and sister; Other in his sister and sister.  ALLERGIES:  Patient has no known allergies.    Prior to Admission medications    Medication Sig Start Date End Date Taking? Authorizing Provider   atorvastatin (LIPITOR) 20 MG

## 2025-01-30 NOTE — PROGRESS NOTES
2/8/2025    Siva Jason    Chief Complaint   Patient presents with    3 Month Follow-Up     3 month    Health Maintenance     Agreeable to diabetic foot exam.     Blood Sugar Problem     123-150 average. Up to 300 twice.        NEEL Paniagua is a 74 y.o. male with a PMHx as listed below who presents today for follow up on chronic conditions. No acute complaints. Doing well on current medications.     Glucose levels have been excellent at home. Mild spikes on occasion.   Mild elevation in bp today    History of Present Illness        1. Type 2 diabetes mellitus with hyperosmolarity without coma, without long-term current use of insulin (HCC)    2. Mixed hyperlipidemia             REVIEW OF SYMPTOMS    Review of Systems   Constitutional:  Negative for chills and fatigue.   HENT:  Negative for congestion and sore throat.    Respiratory:  Negative for shortness of breath and wheezing.    Cardiovascular:  Negative for chest pain and palpitations.   Gastrointestinal:  Negative for abdominal pain and nausea.   Genitourinary:  Negative for frequency and urgency.   Neurological:  Negative for light-headedness.       PAST MEDICAL HISTORY  Past Medical History:   Diagnosis Date    H/O echocardiogram 10/06/2020    EF55-60%, Mild TR & LVH.    History of exercise stress test 10/06/2020    Treadmill, Normal    Hyperlipidemia     Hypertension     PSVT (paroxysmal supraventricular tachycardia) (HCC) 9/24/2020 9/2/2020 ECG at ER at [facility]    Type 2 diabetes mellitus (HCC)        FAMILY HISTORY  Family History   Problem Relation Age of Onset    Diabetes Mother     High Cholesterol Mother     Heart Disease Mother     Cancer Father     Diabetes Sister     High Blood Pressure Sister     High Cholesterol Sister     Other Sister         brain aneurysym    Heart Disease Sister     No Known Problems Sister     Other Sister         in hospital w/ pneumonia as of 10/24/24    Cancer Sister         unknown-chemotherapy    Diabetes Sister

## 2025-01-31 LAB
EST. AVERAGE GLUCOSE BLD GHB EST-MCNC: 148.5 MG/DL
HBA1C MFR BLD: 6.8 %

## 2025-02-08 ASSESSMENT — ENCOUNTER SYMPTOMS
SORE THROAT: 0
ABDOMINAL PAIN: 0
NAUSEA: 0
WHEEZING: 0
SHORTNESS OF BREATH: 0

## 2025-04-25 ENCOUNTER — OFFICE VISIT (OUTPATIENT)
Dept: FAMILY MEDICINE CLINIC | Age: 75
End: 2025-04-25
Payer: MEDICARE

## 2025-04-25 VITALS
SYSTOLIC BLOOD PRESSURE: 150 MMHG | OXYGEN SATURATION: 95 % | HEIGHT: 75 IN | DIASTOLIC BLOOD PRESSURE: 80 MMHG | BODY MASS INDEX: 24.99 KG/M2 | HEART RATE: 88 BPM | WEIGHT: 201 LBS

## 2025-04-25 DIAGNOSIS — E11.00 TYPE 2 DIABETES MELLITUS WITH HYPEROSMOLARITY WITHOUT COMA, WITHOUT LONG-TERM CURRENT USE OF INSULIN (HCC): ICD-10-CM

## 2025-04-25 DIAGNOSIS — I10 PRIMARY HYPERTENSION: ICD-10-CM

## 2025-04-25 DIAGNOSIS — N40.0 BENIGN PROSTATIC HYPERPLASIA WITHOUT LOWER URINARY TRACT SYMPTOMS: Primary | ICD-10-CM

## 2025-04-25 DIAGNOSIS — E78.2 MIXED HYPERLIPIDEMIA: ICD-10-CM

## 2025-04-25 LAB — HBA1C MFR BLD: 7.2 %

## 2025-04-25 PROCEDURE — G8427 DOCREV CUR MEDS BY ELIG CLIN: HCPCS | Performed by: STUDENT IN AN ORGANIZED HEALTH CARE EDUCATION/TRAINING PROGRAM

## 2025-04-25 PROCEDURE — 1036F TOBACCO NON-USER: CPT | Performed by: STUDENT IN AN ORGANIZED HEALTH CARE EDUCATION/TRAINING PROGRAM

## 2025-04-25 PROCEDURE — 1123F ACP DISCUSS/DSCN MKR DOCD: CPT | Performed by: STUDENT IN AN ORGANIZED HEALTH CARE EDUCATION/TRAINING PROGRAM

## 2025-04-25 PROCEDURE — G8417 CALC BMI ABV UP PARAM F/U: HCPCS | Performed by: STUDENT IN AN ORGANIZED HEALTH CARE EDUCATION/TRAINING PROGRAM

## 2025-04-25 PROCEDURE — 83036 HEMOGLOBIN GLYCOSYLATED A1C: CPT | Performed by: STUDENT IN AN ORGANIZED HEALTH CARE EDUCATION/TRAINING PROGRAM

## 2025-04-25 PROCEDURE — 3077F SYST BP >= 140 MM HG: CPT | Performed by: STUDENT IN AN ORGANIZED HEALTH CARE EDUCATION/TRAINING PROGRAM

## 2025-04-25 PROCEDURE — 99214 OFFICE O/P EST MOD 30 MIN: CPT | Performed by: STUDENT IN AN ORGANIZED HEALTH CARE EDUCATION/TRAINING PROGRAM

## 2025-04-25 PROCEDURE — 2022F DILAT RTA XM EVC RTNOPTHY: CPT | Performed by: STUDENT IN AN ORGANIZED HEALTH CARE EDUCATION/TRAINING PROGRAM

## 2025-04-25 PROCEDURE — 3079F DIAST BP 80-89 MM HG: CPT | Performed by: STUDENT IN AN ORGANIZED HEALTH CARE EDUCATION/TRAINING PROGRAM

## 2025-04-25 PROCEDURE — 3017F COLORECTAL CA SCREEN DOC REV: CPT | Performed by: STUDENT IN AN ORGANIZED HEALTH CARE EDUCATION/TRAINING PROGRAM

## 2025-04-25 PROCEDURE — 3051F HG A1C>EQUAL 7.0%<8.0%: CPT | Performed by: STUDENT IN AN ORGANIZED HEALTH CARE EDUCATION/TRAINING PROGRAM

## 2025-04-25 PROCEDURE — 1159F MED LIST DOCD IN RCRD: CPT | Performed by: STUDENT IN AN ORGANIZED HEALTH CARE EDUCATION/TRAINING PROGRAM

## 2025-04-25 RX ORDER — LISINOPRIL 10 MG/1
10 TABLET ORAL DAILY
Qty: 90 TABLET | Refills: 1 | Status: SHIPPED | OUTPATIENT
Start: 2025-04-25

## 2025-04-25 RX ORDER — METFORMIN HYDROCHLORIDE 500 MG/1
1500 TABLET, EXTENDED RELEASE ORAL
Qty: 270 TABLET | Refills: 1 | Status: SHIPPED | OUTPATIENT
Start: 2025-04-25

## 2025-04-25 RX ORDER — ATORVASTATIN CALCIUM 20 MG/1
20 TABLET, FILM COATED ORAL DAILY
Qty: 90 TABLET | Refills: 1 | Status: SHIPPED | OUTPATIENT
Start: 2025-04-25

## 2025-04-25 RX ORDER — METOPROLOL SUCCINATE 50 MG/1
50 TABLET, EXTENDED RELEASE ORAL DAILY
Qty: 90 TABLET | Refills: 1 | Status: SHIPPED | OUTPATIENT
Start: 2025-04-25

## 2025-04-25 NOTE — PROGRESS NOTES
5/5/2025    Siva Jason    Chief Complaint   Patient presents with    6 Month Follow-Up       HPI  Siva is a 74 y.o. male with a PMHx as listed below who presents today for follow up on chronic conditions. No acute complaints. Mild elevation bp today he says it is because he worked 3rd shift and took medications much earlier in the day.     History of Present Illness        1. Benign prostatic hyperplasia without lower urinary tract symptoms    2. Mixed hyperlipidemia    3. Primary hypertension    4. Type 2 diabetes mellitus with hyperosmolarity without coma, without long-term current use of insulin (HCC)             REVIEW OF SYMPTOMS    Review of Systems   Constitutional:  Negative for chills and fatigue.   HENT:  Negative for congestion and sore throat.    Respiratory:  Negative for shortness of breath and wheezing.    Cardiovascular:  Negative for chest pain and palpitations.   Gastrointestinal:  Negative for abdominal pain and nausea.   Genitourinary:  Negative for frequency and urgency.   Neurological:  Negative for light-headedness.       PAST MEDICAL HISTORY  Past Medical History:   Diagnosis Date    H/O echocardiogram 10/06/2020    EF55-60%, Mild TR & LVH.    History of exercise stress test 10/06/2020    Treadmill, Normal    Hyperlipidemia     Hypertension     PSVT (paroxysmal supraventricular tachycardia) 9/24/2020 9/2/2020 ECG at ER at [facility]    Type 2 diabetes mellitus (HCC)        FAMILY HISTORY  Family History   Problem Relation Age of Onset    Diabetes Mother     High Cholesterol Mother     Heart Disease Mother     Cancer Father     Diabetes Sister     High Blood Pressure Sister     High Cholesterol Sister     Other Sister         brain aneurysym    Heart Disease Sister     No Known Problems Sister     Other Sister         in hospital w/ pneumonia as of 10/24/24    Cancer Sister         unknown-chemotherapy    Diabetes Sister     Alcohol Abuse Brother     Heart Disease Brother     No Known

## 2025-05-05 ASSESSMENT — ENCOUNTER SYMPTOMS
ABDOMINAL PAIN: 0
SHORTNESS OF BREATH: 0
SORE THROAT: 0
NAUSEA: 0

## 2025-08-06 ENCOUNTER — OFFICE VISIT (OUTPATIENT)
Dept: CARDIOLOGY CLINIC | Age: 75
End: 2025-08-06
Payer: MEDICARE

## 2025-08-06 VITALS
BODY MASS INDEX: 24.27 KG/M2 | WEIGHT: 195.2 LBS | SYSTOLIC BLOOD PRESSURE: 132 MMHG | DIASTOLIC BLOOD PRESSURE: 72 MMHG | OXYGEN SATURATION: 98 % | HEART RATE: 78 BPM | HEIGHT: 75 IN

## 2025-08-06 DIAGNOSIS — I10 PRIMARY HYPERTENSION: ICD-10-CM

## 2025-08-06 DIAGNOSIS — E78.00 PURE HYPERCHOLESTEROLEMIA: ICD-10-CM

## 2025-08-06 DIAGNOSIS — I47.10 PSVT (PAROXYSMAL SUPRAVENTRICULAR TACHYCARDIA): Primary | ICD-10-CM

## 2025-08-06 DIAGNOSIS — I25.10 ASCVD (ARTERIOSCLEROTIC CARDIOVASCULAR DISEASE): ICD-10-CM

## 2025-08-06 DIAGNOSIS — E11.00 TYPE 2 DIABETES MELLITUS WITH HYPEROSMOLARITY WITHOUT COMA, WITHOUT LONG-TERM CURRENT USE OF INSULIN (HCC): ICD-10-CM

## 2025-08-06 PROCEDURE — 3078F DIAST BP <80 MM HG: CPT | Performed by: INTERNAL MEDICINE

## 2025-08-06 PROCEDURE — 93000 ELECTROCARDIOGRAM COMPLETE: CPT | Performed by: INTERNAL MEDICINE

## 2025-08-06 PROCEDURE — 3017F COLORECTAL CA SCREEN DOC REV: CPT | Performed by: INTERNAL MEDICINE

## 2025-08-06 PROCEDURE — G8427 DOCREV CUR MEDS BY ELIG CLIN: HCPCS | Performed by: INTERNAL MEDICINE

## 2025-08-06 PROCEDURE — 2022F DILAT RTA XM EVC RTNOPTHY: CPT | Performed by: INTERNAL MEDICINE

## 2025-08-06 PROCEDURE — 3051F HG A1C>EQUAL 7.0%<8.0%: CPT | Performed by: INTERNAL MEDICINE

## 2025-08-06 PROCEDURE — 3075F SYST BP GE 130 - 139MM HG: CPT | Performed by: INTERNAL MEDICINE

## 2025-08-06 PROCEDURE — 1123F ACP DISCUSS/DSCN MKR DOCD: CPT | Performed by: INTERNAL MEDICINE

## 2025-08-06 PROCEDURE — 99214 OFFICE O/P EST MOD 30 MIN: CPT | Performed by: INTERNAL MEDICINE

## 2025-08-06 PROCEDURE — 1159F MED LIST DOCD IN RCRD: CPT | Performed by: INTERNAL MEDICINE

## 2025-08-06 PROCEDURE — G8420 CALC BMI NORM PARAMETERS: HCPCS | Performed by: INTERNAL MEDICINE

## 2025-08-06 PROCEDURE — 1036F TOBACCO NON-USER: CPT | Performed by: INTERNAL MEDICINE

## (undated) DEVICE — Z DISCONTINUED NO SUB IDED TUBING ETCO2 AD L6.5FT NSL ORAL CVD PRNG NONFLARED TIP OVR

## (undated) DEVICE — FORCEPS BX 240CM JAW 3.2MM L CAP NDL MIC MESH TTH M00513372